# Patient Record
Sex: FEMALE | Race: WHITE | NOT HISPANIC OR LATINO | Employment: FULL TIME | URBAN - METROPOLITAN AREA
[De-identification: names, ages, dates, MRNs, and addresses within clinical notes are randomized per-mention and may not be internally consistent; named-entity substitution may affect disease eponyms.]

---

## 2017-07-05 ENCOUNTER — OFFICE VISIT (OUTPATIENT)
Dept: URGENT CARE | Facility: RETAIL CLINIC | Age: 32
End: 2017-07-05
Payer: COMMERCIAL

## 2017-07-05 ENCOUNTER — HOSPITAL ENCOUNTER (EMERGENCY)
Facility: CLINIC | Age: 32
Discharge: HOME | End: 2017-07-05
Attending: EMERGENCY MEDICINE | Admitting: EMERGENCY MEDICINE
Payer: COMMERCIAL

## 2017-07-05 VITALS
SYSTOLIC BLOOD PRESSURE: 146 MMHG | HEART RATE: 63 BPM | OXYGEN SATURATION: 96 % | DIASTOLIC BLOOD PRESSURE: 97 MMHG | TEMPERATURE: 97.4 F | RESPIRATION RATE: 18 BRPM

## 2017-07-05 VITALS
DIASTOLIC BLOOD PRESSURE: 108 MMHG | BODY MASS INDEX: 36.39 KG/M2 | RESPIRATION RATE: 16 BRPM | SYSTOLIC BLOOD PRESSURE: 160 MMHG | HEART RATE: 51 BPM | OXYGEN SATURATION: 100 % | WEIGHT: 250 LBS | TEMPERATURE: 98.6 F

## 2017-07-05 DIAGNOSIS — T67.5XXA HEAT EXHAUSTION, INITIAL ENCOUNTER: ICD-10-CM

## 2017-07-05 DIAGNOSIS — L55.0 SUNBURN OF FIRST DEGREE: ICD-10-CM

## 2017-07-05 DIAGNOSIS — T73.2XXA FATIGUE DUE TO EXPOSURE, INITIAL ENCOUNTER: Primary | ICD-10-CM

## 2017-07-05 LAB
ALBUMIN SERPL-MCNC: 3.7 G/DL (ref 3.4–5)
ALBUMIN UR-MCNC: NEGATIVE MG/DL
ALP SERPL-CCNC: 53 U/L (ref 40–150)
ALT SERPL W P-5'-P-CCNC: 20 U/L (ref 0–50)
ANION GAP SERPL CALCULATED.3IONS-SCNC: 6 MMOL/L (ref 3–14)
APPEARANCE UR: ABNORMAL
AST SERPL W P-5'-P-CCNC: 9 U/L (ref 0–45)
BACTERIA #/AREA URNS HPF: ABNORMAL /HPF
BASOPHILS # BLD AUTO: 0 10E9/L (ref 0–0.2)
BASOPHILS NFR BLD AUTO: 0.3 %
BILIRUB SERPL-MCNC: 0.3 MG/DL (ref 0.2–1.3)
BILIRUB UR QL STRIP: NEGATIVE
BUN SERPL-MCNC: 10 MG/DL (ref 7–30)
CALCIUM SERPL-MCNC: 8.7 MG/DL (ref 8.5–10.1)
CHLORIDE SERPL-SCNC: 110 MMOL/L (ref 94–109)
CK SERPL-CCNC: 84 U/L (ref 30–225)
CO2 SERPL-SCNC: 29 MMOL/L (ref 20–32)
COLOR UR AUTO: YELLOW
CREAT SERPL-MCNC: 0.71 MG/DL (ref 0.52–1.04)
DIFFERENTIAL METHOD BLD: NORMAL
EOSINOPHIL # BLD AUTO: 0.2 10E9/L (ref 0–0.7)
EOSINOPHIL NFR BLD AUTO: 1.9 %
ERYTHROCYTE [DISTWIDTH] IN BLOOD BY AUTOMATED COUNT: 13.1 % (ref 10–15)
GFR SERPL CREATININE-BSD FRML MDRD: ABNORMAL ML/MIN/1.7M2
GLUCOSE SERPL-MCNC: 94 MG/DL (ref 70–99)
GLUCOSE UR STRIP-MCNC: NEGATIVE MG/DL
HCG UR QL: NEGATIVE
HCT VFR BLD AUTO: 40.2 % (ref 35–47)
HGB BLD-MCNC: 13.2 G/DL (ref 11.7–15.7)
HGB UR QL STRIP: NEGATIVE
IMM GRANULOCYTES # BLD: 0 10E9/L (ref 0–0.4)
IMM GRANULOCYTES NFR BLD: 0.3 %
KETONES UR STRIP-MCNC: NEGATIVE MG/DL
LACTATE BLD-SCNC: 0.8 MMOL/L (ref 0.7–2.1)
LEUKOCYTE ESTERASE UR QL STRIP: NEGATIVE
LIPASE SERPL-CCNC: 165 U/L (ref 73–393)
LYMPHOCYTES # BLD AUTO: 2.5 10E9/L (ref 0.8–5.3)
LYMPHOCYTES NFR BLD AUTO: 31 %
MCH RBC QN AUTO: 28.5 PG (ref 26.5–33)
MCHC RBC AUTO-ENTMCNC: 32.8 G/DL (ref 31.5–36.5)
MCV RBC AUTO: 87 FL (ref 78–100)
MONOCYTES # BLD AUTO: 0.7 10E9/L (ref 0–1.3)
MONOCYTES NFR BLD AUTO: 8.9 %
MUCOUS THREADS #/AREA URNS LPF: PRESENT /LPF
NEUTROPHILS # BLD AUTO: 4.6 10E9/L (ref 1.6–8.3)
NEUTROPHILS NFR BLD AUTO: 57.6 %
NITRATE UR QL: NEGATIVE
PH UR STRIP: 6 PH (ref 5–7)
PLATELET # BLD AUTO: 228 10E9/L (ref 150–450)
POTASSIUM SERPL-SCNC: 3.6 MMOL/L (ref 3.4–5.3)
PROT SERPL-MCNC: 6.7 G/DL (ref 6.8–8.8)
RBC # BLD AUTO: 4.63 10E12/L (ref 3.8–5.2)
RBC #/AREA URNS AUTO: 1 /HPF (ref 0–2)
SODIUM SERPL-SCNC: 145 MMOL/L (ref 133–144)
SP GR UR STRIP: 1.02 (ref 1–1.03)
SQUAMOUS #/AREA URNS AUTO: 6 /HPF (ref 0–1)
URN SPEC COLLECT METH UR: ABNORMAL
UROBILINOGEN UR STRIP-MCNC: 0 MG/DL (ref 0–2)
WBC # BLD AUTO: 8 10E9/L (ref 4–11)
WBC #/AREA URNS AUTO: <1 /HPF (ref 0–2)

## 2017-07-05 PROCEDURE — 99284 EMERGENCY DEPT VISIT MOD MDM: CPT | Mod: Z6 | Performed by: EMERGENCY MEDICINE

## 2017-07-05 PROCEDURE — 99207 ZZC NO BILLABLE SERVICE THIS VISIT: CPT | Performed by: INTERNAL MEDICINE

## 2017-07-05 PROCEDURE — 83690 ASSAY OF LIPASE: CPT | Performed by: EMERGENCY MEDICINE

## 2017-07-05 PROCEDURE — 96374 THER/PROPH/DIAG INJ IV PUSH: CPT | Performed by: EMERGENCY MEDICINE

## 2017-07-05 PROCEDURE — 81025 URINE PREGNANCY TEST: CPT | Performed by: EMERGENCY MEDICINE

## 2017-07-05 PROCEDURE — 85025 COMPLETE CBC W/AUTO DIFF WBC: CPT | Performed by: EMERGENCY MEDICINE

## 2017-07-05 PROCEDURE — 82550 ASSAY OF CK (CPK): CPT | Performed by: EMERGENCY MEDICINE

## 2017-07-05 PROCEDURE — 99284 EMERGENCY DEPT VISIT MOD MDM: CPT | Mod: 25 | Performed by: EMERGENCY MEDICINE

## 2017-07-05 PROCEDURE — 25000128 H RX IP 250 OP 636: Performed by: EMERGENCY MEDICINE

## 2017-07-05 PROCEDURE — 81001 URINALYSIS AUTO W/SCOPE: CPT | Performed by: EMERGENCY MEDICINE

## 2017-07-05 PROCEDURE — 80053 COMPREHEN METABOLIC PANEL: CPT | Performed by: EMERGENCY MEDICINE

## 2017-07-05 PROCEDURE — 83605 ASSAY OF LACTIC ACID: CPT | Performed by: EMERGENCY MEDICINE

## 2017-07-05 PROCEDURE — 96361 HYDRATE IV INFUSION ADD-ON: CPT | Performed by: EMERGENCY MEDICINE

## 2017-07-05 RX ORDER — SODIUM CHLORIDE 9 MG/ML
1000 INJECTION, SOLUTION INTRAVENOUS CONTINUOUS
Status: DISCONTINUED | OUTPATIENT
Start: 2017-07-05 | End: 2017-07-05

## 2017-07-05 RX ORDER — ONDANSETRON 2 MG/ML
4 INJECTION INTRAMUSCULAR; INTRAVENOUS EVERY 30 MIN PRN
Status: DISCONTINUED | OUTPATIENT
Start: 2017-07-05 | End: 2017-07-05

## 2017-07-05 RX ADMIN — ONDANSETRON 4 MG: 2 INJECTION INTRAMUSCULAR; INTRAVENOUS at 13:11

## 2017-07-05 RX ADMIN — SODIUM CHLORIDE 1000 ML: 9 INJECTION, SOLUTION INTRAVENOUS at 13:10

## 2017-07-05 RX ADMIN — SODIUM CHLORIDE, PRESERVATIVE FREE 1000 ML: 5 INJECTION INTRAVENOUS at 12:03

## 2017-07-05 NOTE — ED NOTES
Pt got a sunburn two days ago, feeling sick and nauseated since.  [facility].

## 2017-07-05 NOTE — DISCHARGE INSTRUCTIONS
Heat Exhaustion  Heat exhaustion is a condition that develops during prolonged exposure to heat. It is more likely to occur during strenuous activity, such as exercise or manual labor. Symptoms include a fast heartbeat, excess sweating, extreme tiredness, muscle cramps, headache, and weakness. They may also include stomach cramps, nausea, and vomiting. The person may be lightheaded and dizzy, and may even faint.  Treatment for heat exhaustion involves cooling the body down and replacing lost fluids, electrolytes, and salts. Cooling may be done with fans, cold cloths, or a cold-water bath. Fluids are best replaced by drinking electrolyte solution, a sports drink, or water with 2 teaspoons of salt added for each 8 ounces. If a person is very dehydrated, confused, or unable to drink, IV (intravenous) fluids will likely be needed.  Heat exhaustion can progress to a serious condition called heatstroke, so it should be treated right away.  Home care  Continue to drink extra cold fluids at home during the next 12-24 hours. Water, electrolyte solution, or sports drinks are advised. Avoid alcohol and caffeine.  Preventing heat illness:    Protect yourself from the heat. Wear lightweight, light-colored clothing and a broad-brimmed hat.    Drink plenty of fluids before and during activity.    Limit exercise in hot or very humid weather. If you have to be active in the heat, take frequent breaks to drink fluids and cool down.    Avoid exercising when you are feeling ill.    Watch for symptoms of heat illness such as exhaustion, excess sweating, and lightheadedness. If any occur, move to a cool place, rest, and drink cool fluids. Lying down with your legs raised slightly can help you recover.    Avoid alcohol and caffeine.  Follow-up care  Follow up with your healthcare provider or this facility if symptoms do not improve within 1 hour.  When to seek medical advice  Call your healthcare provider right away for any of the  following:    Inability to keep fluids down    Vomiting    Worsening symptoms or new symptoms  Call 911  Call 911 or emergency services right away for any of these symptoms of heatstroke:    Confusion    Irrational behavior    Hallucinations    Trouble walking    Seizures    Vomiting or diarrhea    Hot, flushed skin    Passing out    Fever of 104 F (40 C) or higher  Date Last Reviewed: 6/26/2015 2000-2017 [employer]. [address] [place] [address], [place] [address]. All rights reserved. This information is not intended as a substitute for professional medical care. Always follow your healthcare professional's instructions.

## 2017-07-05 NOTE — ED AVS SNAPSHOT
[facility] Emergency Department    [address] [place] [address]    Phone:  [phone]    Fax:  [phone]                                       Leah Holloway   MRN: [id number]    Department:  [facility] Emergency Department   Date of Visit:  7/5/2017           After Visit Summary Signature Page     I have received my discharge instructions, and my questions have been answered. I have discussed any challenges I see with this plan with the nurse or doctor.    ..........................................................................................................................................  Patient/Patient Representative Signature      ..........................................................................................................................................  Patient Representative Print Name and Relationship to Patient    ..................................................               ................................................  Date                                            Time    ..........................................................................................................................................  Reviewed by Signature/Title    ...................................................              ..............................................  Date                                                            Time

## 2017-07-05 NOTE — NURSING NOTE
"Chief Complaint   Patient presents with     Sunburn     Sunburn monday on neck and back along with fatigue and just feeling of being ill     Nausea     patient states feeling nauseated everytime she eats or drinks. Denies pregancy last cycle was last week        Initial BP (!) 146/97 (BP Location: Right arm, Patient Position: Chair, Cuff Size: Adult Large)  Pulse 63  Temp 97.4  F (36.3  C) (Tympanic)  Resp 18  LMP 06/25/2017  SpO2 96% Estimated body mass index is 36.97 kg/(m^2) as calculated from the following:    Height as of 4/21/16: 5' 9.5\" (1.765 m).    Weight as of 4/21/16: 254 lb (115.2 kg).  Medication Reconciliation: complete   Jessica Sundet      "

## 2017-07-05 NOTE — ED PROVIDER NOTES
History     Chief Complaint   Patient presents with     Dehydration     HPI  Leah Holloway is a 31 year old female who presents with 2 day history of mild headache, generalized weakness and fatigue, nausea since she was out in the sun 2 days ago.  She did get 1st degree sunburn on her back.  She now presents with concerns for dehydration.  Patient admits she is urinating but does not recall if her urine was dark in color.  She denies chest pain or shortness of breath.  Has a mild nonproductive cough.  She is not running fever or chills.  Denies abdominal pain.  No vaginal discharge or bleeding.  She does not think she is pregnant.  Her last menstrual cycle was a week ago.  Has generalized weakness but no focal motor weakness or sensory changes.  No exposure to infectious illness.  Denies alcohol use with the incident.  She went to the urgent care this morning and they thought she was dehydrated but no blood work or urinalysis was assessed.  Review of the urgent care encounter show she was not tachycardic and was mildly hypertensive on the diastolic aspect. She was afebrile.    I have reviewed the Medications, Allergies, Past Medical and Surgical History, and Social History in the Epic system.         Review of Systems all other systems were reviewed and are negative.  History reviewed. No pertinent past medical history.  Patient Active Problem List   Diagnosis     Elevated blood pressure reading without diagnosis of hypertension     Headache     Syncope and collapse     GERD (gastroesophageal reflux disease)     CARDIOVASCULAR SCREENING; LDL GOAL LESS THAN 160     Synovitis of knee     Chondromalacia patellae, right     Renal artery stenosis (H)     Plica of knee, right     Current Facility-Administered Medications   Medication     0.9% sodium chloride infusion     ondansetron (ZOFRAN) injection 4 mg     Current Outpatient Prescriptions   Medication     IBUPROFEN PO     FLONASE 50 MCG/ACT NA SUSP     BP  MONITOR-STETHOSCOPE KIT     ORTHO TRI-CYCLEN LO OR        Allergies   Allergen Reactions     Aluminum Hives     Reaction when pt comes into contact with aluminum.     Benadryl [Anti-Itch]      No Clinical Screening - See Comments Rash     Adhesives     Social History     Social History     Marital status: Single     Spouse name: N/A     Number of children: N/A     Years of education: N/A     Occupational History           [employer], asst. mgr.      Social History Main Topics     Smoking status: Never Smoker     Smokeless tobacco: Never Used      Comment: No smokers in home.     Alcohol use No     Drug use: No     Sexual activity: No     Other Topics Concern     Not on file     Social History Narrative     Family History   Problem Relation Age of Onset     Coronary Artery Disease No family hx of      Colon Cancer No family hx of      MENTAL ILLNESS No family hx of      Obesity No family hx of      OSTEOPOROSIS No family hx of      Depression No family hx of      CEREBROVASCULAR DISEASE No family hx of      Physical Exam   BP: (!) 160/101  Pulse: 58  Temp: 98.6  F (37  C)  Resp: 16  Weight: 113.4 kg (250 lb)  SpO2: 100 %  Physical Exam Gen. alert cooperative female in mild distress.  HEENT shows no facial asymmetry or droop.  Pupils are equal and reactive to light and accommodation.  Extraocular motions are intact.  Ears show increased cerumen bilateral canals.  The partially visualized TMs appear normal.  Nasal passages are patent.  Orally she has moist mucosa.  Her speech is clear and concise.  Neck is supple without limitation.  No adenopathy or stridor.  Lungs are clear without adventitious sounds.  Cardiac regular rate without murmur.  Back there is no CVA tenderness.  Abdomen is obese with active bowel sounds.  She has mild left lower quadrant tenderness to palpation.  There is no guarding, rebound, or organomegaly.  On skin exam patient has first-degree sunburn on her upper back and arms.  No 2nd degree lesions  or other skin rashes are noted.    ED Course     ED Course     Procedures             Critical Care time:  none               Labs Ordered and Resulted from Time of ED Arrival Up to the Time of Departure from the ED   COMPREHENSIVE METABOLIC PANEL - Abnormal; Notable for the following:        Result Value    Sodium 145 (*)     Chloride 110 (*)     Protein Total 6.7 (*)     All other components within normal limits   URINE MACROSCOPIC WITH REFLEX TO MICRO - Abnormal; Notable for the following:     Bacteria Urine Few (*)     Squamous Epithelial /HPF Urine 6 (*)     Mucous Urine Present (*)     All other components within normal limits   CBC WITH PLATELETS DIFFERENTIAL   LIPASE   LACTIC ACID WHOLE BLOOD   CK TOTAL   HCG QUALITATIVE URINE     IV was established and fluid boluses provided.  Patient is given Zofran.  Blood work and urinalysis are ordered.  At 12:30 PM discussed results were blood work being essentially normal.  Normal CPK.  Normal white count and lactic acid.  No evidence of pancreatitis, hepatitis, or biliary disease but blood work.  Her sodium and chloride are mildly elevated.  1st liter of fluids was infusing.  Patient's nausea was not significant changed but she deferred further meds at this point.  At 1:25 PM on recheck patient is sitting upright and has no specific complaints.  She does not feel any worse but states she does not feel to baseline.  Denies nausea.  2nd liter fluids is infusing.  At 1:50 PM on recheck patient felt she could go home.  They have Zofran at home.  Assessments & Plan (with Medical Decision Making)   Patient is a 31-year-old female presents with complaints of possible dehydration.  Patient states that 3 days ago she was out in the sun for extended period of time.  She had 1st degree sunburn on her arms and back.  Since that time she has not felt well with generalized weakness, fatigue, mild headache, and nausea.  No vomiting or diarrhea.  No chest pain or shortness of  breath.  No fever or chills.  He was seen in the urgent care clinic and felt to be dehydrated and is in is sent for further evaluation.  At the urgent care clinic she was afebrile and did not have tachycardia or hypotension.  No blood work or urine assessment was done.  Upon arrival here the patient is afebrile and vital signs are stable.  She is not tachycardic is actually mildly hypertensive.  HEENT showed no irregularity.  She had moist mucosa.  She is able to speak in complete sentences.  Neck was supple.  Lungs were clear.  Cardiac auscultation was normal.  No CVA tenderness.  Mild left lower quadrant abdominal pain with palpation but no guarding or rebound.  Other than sunburn on her upper back and arms no other skin rashes are noted.  Blood work was obtained and is unremarkable as above.  This included a CPK total which was normal so doubt significant heat exhaustion or muscle breakdown.  Urinalysis showed no acute abnormality.  Pregnancy test was negative.  Patient received IV fluids with improvement.  Information on heat exhaustion is provided.  She should avoid excessive heat for the next 24-48 hrs.  Encourage fluids.  Reasons to return for reassessment are discussed.  I have reviewed the nursing notes.    I have reviewed the findings, diagnosis, plan and need for follow up with the patient.       New Prescriptions    No medications on file       Final diagnoses:   Heat exhaustion, initial encounter       7/5/2017   [facility] EMERGENCY DEPARTMENT     Lino Warren MD  07/05/17 2552

## 2017-07-05 NOTE — MR AVS SNAPSHOT
"              After Visit Summary   2017    Leah Holloway    MRN: [id number]           Patient Information     Date Of Birth          1985        Visit Information        Provider Department      2017 10:50 AM Gilbert San MD [facility]        Today's Diagnoses     Fatigue due to exposure, initial encounter    -  1       Follow-ups after your visit        Who to contact     You can reach your care team any time of the day by calling [phone].  Notification of test results:  If you have an abnormal lab result, we will notify you by phone as soon as possible.         Additional Information About Your Visit        MyChart Information     [employer] lets you send messages to your doctor, view your test results, renew your prescriptions, schedule appointments and more. To sign up, go to [email or web address] . Click on \"Log in\" on the left side of the screen, which will take you to the Welcome page. Then click on \"Sign up Now\" on the right side of the page.     You will be asked to enter the access code listed below, as well as some personal information. Please follow the directions to create your username and password.     Your access code is: [email or web address]  Expires: 10/3/2017 12:45 PM     Your access code will  in 90 days. If you need help or a new code, please call your [facility] clinic or [phone].        Care EveryWhere ID     This is your Care EveryWhere ID. This could be used by other organizations to access your [facility] medical records  [id number]        Your Vitals Were     Pulse Temperature Respirations Last Period Pulse Oximetry       63 97.4  F (36.3  C) (Tympanic) 18 2017 96%        Blood Pressure from Last 3 Encounters:   17 (!) 160/101   17 (!) 146/97   16 122/70    Weight from Last 3 Encounters:   17 250 lb (113.4 kg)   16 254 lb (115.2 kg)   16 250 lb (113.4 kg)              Today, you had the following     " No orders found for display       Primary Care Provider Office Phone # Fax #    Jacy Bruner [phone]       [facility]  [address] [facility] [address]        Equal Access to Services     ERIS MEDINA : Hadii aad ku hadarturoo Sojusticeali, waaxda luqadaha, qaybta kaalmada adeegyada, waxchaitanya rooseveltin hayaan limarashad fortune alexandr rodriguez. So [facility] [phone].    ATENCIÓN: Si habla español, tiene a george disposición servicios gratuitos de asistencia lingüística. Llame al [phone].    We comply with applicable federal civil rights laws and [place] laws. We do not discriminate on the basis of race, color, national origin, age, disability sex, sexual orientation or gender identity.            Thank you!     Thank you for choosing [facility]  for your care. Our goal is always to provide you with excellent care. Hearing back from our patients is one way we can continue to improve our services. Please take a few minutes to complete the written survey that you may receive in the mail after your visit with us. Thank you!             Your Updated Medication List - Protect others around you: Learn how to safely use, store and throw away your medicines at www.disposemymeds.org.          This list is accurate as of: 7/5/17 12:45 PM.  Always use your most recent med list.                   Brand Name Dispense Instructions for use Diagnosis    BP Monitor-Stethoscope Kit     1 kit    test as directed    Elevated blood pressure reading without diagnosis of hypertension       FLONASE 50 MCG/ACT spray   Generic drug:  fluticasone     3 Bottle    USE 2 SPRAYS IN EACH NOSTRIL ONCE DAILY    Chronic rhinitis       IBUPROFEN PO      Take 200 mg by mouth        ORTHO TRI-CYCLEN LO PO      1 TABLET DAILY

## 2017-07-05 NOTE — PROGRESS NOTES
A pleasant 31 year old female presents to express care w generalized malaise,fatigue,headache,dizziness ,nausea and poor appetite x 2 days. She was helping her parents move household belongings including furniture working in the intense sunlight from 10 am to 3-4 pm two days ago. No melena,hematuria or hematochezia is referred. No hematemesis. No vomiting or diarrhea.She reports sunburn in the upper torso. Her LMP was a week ago. Given the patient's clinical presentation and the need for further investigation including laboratory evaluation metabolic panel and potential rhabdomyolysis and volume depletion she is directed to [facility] accompanied by her mother who is driving. She agrees w this plan. She is currently clinically stable.

## 2017-07-05 NOTE — ED AVS SNAPSHOT
[facility] Emergency Department    [address] [place] [address]    Phone:  [phone]    Fax:  [phone]                                       Leah Holloway   MRN: [id number]    Department:  [facility] Emergency Department   Date of Visit:  7/5/2017           Patient Information     Date Of Birth          1985        Your diagnoses for this visit were:     Heat exhaustion, initial encounter        You were seen by Lino Warren MD.      Follow-up Information     Follow up with Jacy Bruner MD.    Specialty:  Family Practice    Why:  As needed    Contact information:    [facility]   [address] [facility] [address]  [phone]          Discharge Instructions         Heat Exhaustion  Heat exhaustion is a condition that develops during prolonged exposure to heat. It is more likely to occur during strenuous activity, such as exercise or manual labor. Symptoms include a fast heartbeat, excess sweating, extreme tiredness, muscle cramps, headache, and weakness. They may also include stomach cramps, nausea, and vomiting. The person may be lightheaded and dizzy, and may even faint.  Treatment for heat exhaustion involves cooling the body down and replacing lost fluids, electrolytes, and salts. Cooling may be done with fans, cold cloths, or a cold-water bath. Fluids are best replaced by drinking electrolyte solution, a sports drink, or water with 2 teaspoons of salt added for each 8 ounces. If a person is very dehydrated, confused, or unable to drink, IV (intravenous) fluids will likely be needed.  Heat exhaustion can progress to a serious condition called heatstroke, so it should be treated right away.  Home care  Continue to drink extra cold fluids at home during the next 12-24 hours. Water, electrolyte solution, or sports drinks are advised. Avoid alcohol and caffeine.  Preventing heat illness:    Protect yourself from the heat. Wear lightweight,  light-colored clothing and a broad-brimmed hat.    Drink plenty of fluids before and during activity.    Limit exercise in hot or very humid weather. If you have to be active in the heat, take frequent breaks to drink fluids and cool down.    Avoid exercising when you are feeling ill.    Watch for symptoms of heat illness such as exhaustion, excess sweating, and lightheadedness. If any occur, move to a cool place, rest, and drink cool fluids. Lying down with your legs raised slightly can help you recover.    Avoid alcohol and caffeine.  Follow-up care  Follow up with your healthcare provider or this facility if symptoms do not improve within 1 hour.  When to seek medical advice  Call your healthcare provider right away for any of the following:    Inability to keep fluids down    Vomiting    Worsening symptoms or new symptoms  Call 911  Call 911 or emergency services right away for any of these symptoms of heatstroke:    Confusion    Irrational behavior    Hallucinations    Trouble walking    Seizures    Vomiting or diarrhea    Hot, flushed skin    Passing out    Fever of 104 F (40 C) or higher  Date Last Reviewed: 6/26/2015 2000-2017 [employer]. [address] [place] [address]. All rights reserved. This information is not intended as a substitute for professional medical care. Always follow your healthcare professional's instructions.          24 Hour Appointment Hotline       To make an appointment at any [facility] clinic, call [phone] ([phone]). If you don't have a family doctor or clinic, we will help you find one. [facility] clinics are conveniently located to serve the needs of you and your family.             Review of your medicines      Our records show that you are taking the medicines listed below. If these are incorrect, please call your family doctor or clinic.        Dose / Directions Last dose taken    BP Monitor-Stethoscope Kit   Quantity:  1 kit        test as  "directed   Refills:  0        FLONASE 50 MCG/ACT spray   Quantity:  3 Bottle   Generic drug:  fluticasone        USE 2 SPRAYS IN EACH NOSTRIL ONCE DAILY   Refills:  3        IBUPROFEN PO   Dose:  200 mg        Take 200 mg by mouth   Refills:  0        ORTHO TRI-CYCLEN LO PO        1 TABLET DAILY   Refills:  0                Procedures and tests performed during your visit     CBC with platelets differential    CK total    Comprehensive metabolic panel    HCG qualitative urine    Lactic acid whole blood    Lipase    UA reflex to Microscopic      Orders Needing Specimen Collection     None      Pending Results     No orders found from 7/3/2017 to 7/6/2017.            Pending Culture Results     No orders found from 7/3/2017 to 7/6/2017.            Pending Results Instructions     If you had any lab results that were not finalized at the time of your Discharge, you can call the ED Lab Result RN at [phone]. You will be contacted by this team for any positive Lab results or changes in treatment. The nurses are available 7 days a week from 10A to 6:30P.  You can leave a message 24 hours per day and they will return your call.        Thank you for choosing [facility]       Thank you for choosing [facility] for your care. Our goal is always to provide you with excellent care. Hearing back from our patients is one way we can continue to improve our services. Please take a few minutes to complete the written survey that you may receive in the mail after you visit with us. Thank you!        [employer] Information     [employer] lets you send messages to your doctor, view your test results, renew your prescriptions, schedule appointments and more. To sign up, go to [email or web address] . Click on \"Log in\" on the left side of the screen, which will take you to the Welcome page. Then click on \"Sign up Now\" on the right side of the page.     You will be asked to enter the access code listed below, as well as some personal " information. Please follow the directions to create your username and password.     Your access code is: [email or web address]  Expires: 10/3/2017 12:45 PM     Your access code will  in 90 days. If you need help or a new code, please call your [facility] clinic or [phone].        Care EveryWhere ID     This is your Care EveryWhere ID. This could be used by other organizations to access your [facility] medical records  [id number]        Equal Access to Services     Robert F. Kennedy Medical CenterHEMALATHA : Nico woodardo Sorandy, waaxda luqadaha, qaybta kaalmada adefaizan, keiko strange . So [facility] [phone].    ATENCIÓN: Si habla español, tiene a george disposición servicios gratuitos de asistencia lingüística. Llame al [phone].    We comply with applicable federal civil rights laws and [place] laws. We do not discriminate on the basis of race, color, national origin, age, disability sex, sexual orientation or gender identity.            After Visit Summary       This is your record. Keep this with you and show to your community pharmacist(s) and doctor(s) at your next visit.

## 2017-07-06 NOTE — ED NOTES
Had 1500 cc IV fluids in and is feeling much better. BP is elevated. Told to please recheck it when she is feeling better and told if it is still elevated to see her primary.

## 2018-05-19 ENCOUNTER — RADIANT APPOINTMENT (OUTPATIENT)
Dept: GENERAL RADIOLOGY | Facility: CLINIC | Age: 33
End: 2018-05-19
Attending: PHYSICIAN ASSISTANT
Payer: COMMERCIAL

## 2018-05-19 ENCOUNTER — OFFICE VISIT (OUTPATIENT)
Dept: URGENT CARE | Facility: URGENT CARE | Age: 33
End: 2018-05-19

## 2018-05-19 VITALS
HEART RATE: 70 BPM | BODY MASS INDEX: 37.84 KG/M2 | OXYGEN SATURATION: 96 % | SYSTOLIC BLOOD PRESSURE: 147 MMHG | DIASTOLIC BLOOD PRESSURE: 94 MMHG | WEIGHT: 260 LBS | TEMPERATURE: 98 F

## 2018-05-19 DIAGNOSIS — M25.579 ANKLE PAIN: ICD-10-CM

## 2018-05-19 DIAGNOSIS — S93.402A SPRAIN OF LEFT ANKLE, UNSPECIFIED LIGAMENT, INITIAL ENCOUNTER: ICD-10-CM

## 2018-05-19 DIAGNOSIS — M25.572 ACUTE LEFT ANKLE PAIN: Primary | ICD-10-CM

## 2018-05-19 PROCEDURE — 99213 OFFICE O/P EST LOW 20 MIN: CPT | Performed by: PHYSICIAN ASSISTANT

## 2018-05-19 PROCEDURE — 73610 X-RAY EXAM OF ANKLE: CPT | Mod: LT

## 2018-05-19 ASSESSMENT — ENCOUNTER SYMPTOMS
PALPITATIONS: 0
JOINT SWELLING: 1
HEMATOLOGIC/LYMPHATIC NEGATIVE: 1
BRUISES/BLEEDS EASILY: 0
SHORTNESS OF BREATH: 0
CHEST TIGHTNESS: 0
RESPIRATORY NEGATIVE: 1
EYES NEGATIVE: 1
ACTIVITY CHANGE: 0
GASTROINTESTINAL NEGATIVE: 1
NEUROLOGICAL NEGATIVE: 1
ARTHRALGIAS: 1
NECK STIFFNESS: 0
CARDIOVASCULAR NEGATIVE: 1
FEVER: 0
APPETITE CHANGE: 0
MYALGIAS: 0
ABDOMINAL PAIN: 0
NAUSEA: 0
ALLERGIC/IMMUNOLOGIC NEGATIVE: 1
VOMITING: 0
NECK PAIN: 0
DIARRHEA: 0
WOUND: 0

## 2018-05-19 ASSESSMENT — PAIN SCALES - GENERAL: PAINLEVEL: MODERATE PAIN (5)

## 2018-05-19 NOTE — MR AVS SNAPSHOT
"              After Visit Summary   2018    Leah Holloway    MRN: [id number]           Patient Information     Date Of Birth          1985        Visit Information        Provider Department      2018 12:35 PM Baldemar Ramirez PA-C [facility]        Today's Diagnoses     Acute left ankle pain    -  1    Sprain of left ankle, unspecified ligament, initial encounter           Follow-ups after your visit        Who to contact     If you have questions or need follow up information about today's clinic visit or your schedule please contact [facility] directly at [phone].  Normal or non-critical lab and imaging results will be communicated to you by Locappyhart, letter or phone within 4 business days after the clinic has received the results. If you do not hear from us within 7 days, please contact the clinic through Locappyhart or phone. If you have a critical or abnormal lab result, we will notify you by phone as soon as possible.  Submit refill requests through [employer] or call your pharmacy and they will forward the refill request to us. Please allow 3 business days for your refill to be completed.          Additional Information About Your Visit        MyChart Information     [employer] lets you send messages to your doctor, view your test results, renew your prescriptions, schedule appointments and more. To sign up, go to [email or web address] . Click on \"Log in\" on the left side of the screen, which will take you to the Welcome page. Then click on \"Sign up Now\" on the right side of the page.     You will be asked to enter the access code listed below, as well as some personal information. Please follow the directions to create your username and password.     Your access code is: [email or web address]  Expires: 2018  1:54 PM     Your access code will  in 90 days. If you need help or a new code, please call your [facility] or [phone].        Care " EveryWhere ID     This is your Care EveryWhere ID. This could be used by other organizations to access your [facility] medical records  [id number]        Your Vitals Were     Pulse Temperature Pulse Oximetry BMI (Body Mass Index)          70 98  F (36.7  C) (Oral) 96% 37.84 kg/m2         Blood Pressure from Last 3 Encounters:   05/19/18 (!) 147/94   07/05/17 (!) 160/108   07/05/17 (!) 146/97    Weight from Last 3 Encounters:   05/19/18 260 lb (117.9 kg)   07/05/17 250 lb (113.4 kg)   04/21/16 254 lb (115.2 kg)               Primary Care Provider Office Phone # Fax #    Jacy Bruner [phone]       [address] [place] [address]        Equal Access to Services     KASHIF MEDINA : Hadii jagruti kern hadasho Soomaali, waaxda luqadaha, qaybta kaalmada adeegyada, keiko strange . So [facility] [phone].    ATENCIÓN: Si habla español, tiene a george disposición servicios gratuitos de asistencia lingüística. Sushma al [phone].    We comply with applicable federal civil rights laws and [place] laws. We do not discriminate on the basis of race, color, national origin, age, disability, sex, sexual orientation, or gender identity.            Thank you!     Thank you for choosing [facility]  for your care. Our goal is always to provide you with excellent care. Hearing back from our patients is one way we can continue to improve our services. Please take a few minutes to complete the written survey that you may receive in the mail after your visit with us. Thank you!             Your Updated Medication List - Protect others around you: Learn how to safely use, store and throw away your medicines at www.disposemymeds.org.          This list is accurate as of 5/19/18  1:54 PM.  Always use your most recent med list.                   Brand Name Dispense Instructions for use Diagnosis    BP Monitor-Stethoscope Kit     1 kit    test as directed    Elevated blood  pressure reading without diagnosis of hypertension       FLONASE 50 MCG/ACT spray   Generic drug:  fluticasone     3 Bottle    USE 2 SPRAYS IN EACH NOSTRIL ONCE DAILY    Chronic rhinitis       IBUPROFEN PO      Take 200 mg by mouth        ORTHO TRI-CYCLEN LO PO      1 TABLET DAILY

## 2018-05-19 NOTE — PROGRESS NOTES
Chief Complaint:    Chief Complaint   Patient presents with     Work Comp     5/11/18 left ankle       HPI: Leah Holloway is an 32 year old female who presents for evaluation and treatment of L ankle pain.  Patient tripped over a box 1 week ago and twisted the ankle.  She had immediate ankle pain and swelling.  She has been icing the ankle.  The pain has been improving.  She is walking on the L ankle with no problems.  She was advised to come and get it looked at by her employer.      ROS:      Review of Systems   Constitutional: Negative for activity change, appetite change and fever.   HENT: Negative.    Eyes: Negative.    Respiratory: Negative.  Negative for chest tightness and shortness of breath.    Cardiovascular: Negative.  Negative for chest pain and palpitations.   Gastrointestinal: Negative.  Negative for abdominal pain, diarrhea, nausea and vomiting.   Genitourinary: Negative.    Musculoskeletal: Positive for arthralgias and joint swelling. Negative for myalgias, neck pain and neck stiffness.   Skin: Negative.  Negative for rash and wound.   Allergic/Immunologic: Negative.  Negative for immunocompromised state.   Neurological: Negative.    Hematological: Negative.  Does not bruise/bleed easily.        Family History   Family History   Problem Relation Age of Onset     Coronary Artery Disease No family hx of      Colon Cancer No family hx of      MENTAL ILLNESS No family hx of      Obesity No family hx of      OSTEOPOROSIS No family hx of      Depression No family hx of      CEREBROVASCULAR DISEASE No family hx of         Surgical History:  Past Surgical History:   Procedure Laterality Date     ARTHROSCOPY KNEE Right 1/22/2016    Procedure: ARTHROSCOPY KNEE;  Surgeon: Ian Jaimes MD;  Location: [facility] OR     GENITOURINARY SURGERY       HC KNEE SCOPE,PART SYNOVECT Right 1/22/16    Medial plica excision - WORK COMP        Problem List:  Patient Active Problem List   Diagnosis     Elevated blood  pressure reading without diagnosis of hypertension     Headache     Syncope and collapse     GERD (gastroesophageal reflux disease)     CARDIOVASCULAR SCREENING; LDL GOAL LESS THAN 160     Synovitis of knee     Chondromalacia patellae, right     Renal artery stenosis (H)     Plica of knee, right        Allergies:  Allergies   Allergen Reactions     Aluminum Hives     Reaction when pt comes into contact with aluminum.     Benadryl [Anti-Itch]      No Clinical Screening - See Comments Rash     Adhesives        Current Meds:    Current Outpatient Prescriptions:      BP MONITOR-STETHOSCOPE KIT, test as directed (Patient not taking: No sig reported), Disp: 1 kit, Rfl: 0     FLONASE 50 MCG/ACT NA SUSP, USE 2 SPRAYS IN EACH NOSTRIL ONCE DAILY, Disp: 3 Bottle, Rfl: 3     IBUPROFEN PO, Take 200 mg by mouth, Disp: , Rfl:      ORTHO TRI-CYCLEN LO OR, 1 TABLET DAILY, Disp: , Rfl:      PHYSICAL EXAM:     Vital signs noted and reviewed by Baldemar Ramirez  BP (!) 147/94 (BP Location: Left arm, Patient Position: Chair, Cuff Size: Adult Large)  Pulse 70  Temp 98  F (36.7  C) (Oral)  Wt 260 lb (117.9 kg)  SpO2 96%  BMI 37.84 kg/m2     PEFR:  General appearance: healthy, alert and no distress  Ears: R TM - normal: no effusions, no erythema, and normal landmarks, L TM - normal: no effusions, no erythema, and normal landmarks  Eyes: R normal, L normal  Nose: normal  Oropharynx: normal  Neck: supple and no adenopathy  Lungs: normal and clear to auscultation  Heart: S1, S2 normal, no murmur, click, rub or gallop, regular rate and rhythm  Extremities: L ankle - Extremities normal. No deformities, edema, or skin discoloration., negative findings: no evidence of joint effusion, no evidence of joint instability, no crepitation detected, strength normal     Labs:     XR ANKLE LT G/E 3 VW 5/19/2018 12:49 PM     COMPARISON: None.     HISTORY: Ankle pain.         IMPRESSION: No fractures are seen in the left ankle. Ankle mortise  is  congruent with preserved joint space. Plantar calcaneal spur and mild  Achilles enthesopathy is seen.     LENNY HUFF MD     ASSESSMENT:     1. Ankle pain    2. Sprain of left ankle, unspecified ligament, initial encounter           PLAN:     Discussed imaging with patient.  XR of the ankle was negative for any acute fracture.  Continue to ice the ankle.  Ibuprofen for any discomfort.  Worrisome symptoms discussed with instructions to go to the ED.  Patient verbalized understanding and agreed with this plan.     Baldemar Ramirez  5/19/2018, 12:34 PM

## 2018-06-06 ENCOUNTER — HOSPITAL ENCOUNTER (EMERGENCY)
Facility: CLINIC | Age: 33
Discharge: HOME | End: 2018-06-07
Attending: FAMILY MEDICINE | Admitting: FAMILY MEDICINE
Payer: COMMERCIAL

## 2018-06-06 DIAGNOSIS — R19.7 DIARRHEA, UNSPECIFIED TYPE: ICD-10-CM

## 2018-06-06 DIAGNOSIS — H81.12 BENIGN PAROXYSMAL POSITIONAL VERTIGO OF LEFT EAR: ICD-10-CM

## 2018-06-06 DIAGNOSIS — H61.23 BILATERAL IMPACTED CERUMEN: ICD-10-CM

## 2018-06-06 LAB
ALBUMIN SERPL-MCNC: 3.9 G/DL (ref 3.4–5)
ALBUMIN UR-MCNC: NEGATIVE MG/DL
ALP SERPL-CCNC: 63 U/L (ref 40–150)
ALT SERPL W P-5'-P-CCNC: 20 U/L (ref 0–50)
ANION GAP SERPL CALCULATED.3IONS-SCNC: 9 MMOL/L (ref 3–14)
APPEARANCE UR: CLEAR
AST SERPL W P-5'-P-CCNC: 8 U/L (ref 0–45)
BACTERIA #/AREA URNS HPF: ABNORMAL /HPF
BASOPHILS # BLD AUTO: 0 10E9/L (ref 0–0.2)
BASOPHILS NFR BLD AUTO: 0.4 %
BILIRUB SERPL-MCNC: 0.2 MG/DL (ref 0.2–1.3)
BILIRUB UR QL STRIP: NEGATIVE
BUN SERPL-MCNC: 14 MG/DL (ref 7–30)
CALCIUM SERPL-MCNC: 8.8 MG/DL (ref 8.5–10.1)
CHLORIDE SERPL-SCNC: 109 MMOL/L (ref 94–109)
CO2 SERPL-SCNC: 25 MMOL/L (ref 20–32)
COLOR UR AUTO: YELLOW
CREAT SERPL-MCNC: 0.86 MG/DL (ref 0.52–1.04)
DIFFERENTIAL METHOD BLD: ABNORMAL
EOSINOPHIL # BLD AUTO: 0.2 10E9/L (ref 0–0.7)
EOSINOPHIL NFR BLD AUTO: 1.9 %
ERYTHROCYTE [DISTWIDTH] IN BLOOD BY AUTOMATED COUNT: 12.9 % (ref 10–15)
GFR SERPL CREATININE-BSD FRML MDRD: 75 ML/MIN/1.7M2
GLUCOSE SERPL-MCNC: 92 MG/DL (ref 70–99)
GLUCOSE UR STRIP-MCNC: NEGATIVE MG/DL
HCT VFR BLD AUTO: 41 % (ref 35–47)
HGB BLD-MCNC: 13.6 G/DL (ref 11.7–15.7)
HGB UR QL STRIP: NEGATIVE
IMM GRANULOCYTES # BLD: 0 10E9/L (ref 0–0.4)
IMM GRANULOCYTES NFR BLD: 0.4 %
KETONES UR STRIP-MCNC: NEGATIVE MG/DL
LEUKOCYTE ESTERASE UR QL STRIP: NEGATIVE
LIPASE SERPL-CCNC: 143 U/L (ref 73–393)
LYMPHOCYTES # BLD AUTO: 3.9 10E9/L (ref 0.8–5.3)
LYMPHOCYTES NFR BLD AUTO: 34.2 %
MCH RBC QN AUTO: 28.8 PG (ref 26.5–33)
MCHC RBC AUTO-ENTMCNC: 33.2 G/DL (ref 31.5–36.5)
MCV RBC AUTO: 87 FL (ref 78–100)
MONOCYTES # BLD AUTO: 0.9 10E9/L (ref 0–1.3)
MONOCYTES NFR BLD AUTO: 7.9 %
MUCOUS THREADS #/AREA URNS LPF: PRESENT /LPF
NEUTROPHILS # BLD AUTO: 6.3 10E9/L (ref 1.6–8.3)
NEUTROPHILS NFR BLD AUTO: 55.2 %
NITRATE UR QL: NEGATIVE
NRBC # BLD AUTO: 0 10*3/UL
NRBC BLD AUTO-RTO: 0 /100
PH UR STRIP: 6 PH (ref 5–7)
PLATELET # BLD AUTO: 237 10E9/L (ref 150–450)
POTASSIUM SERPL-SCNC: 4.1 MMOL/L (ref 3.4–5.3)
PROT SERPL-MCNC: 7.2 G/DL (ref 6.8–8.8)
RBC # BLD AUTO: 4.72 10E12/L (ref 3.8–5.2)
RBC #/AREA URNS AUTO: <1 /HPF (ref 0–2)
SODIUM SERPL-SCNC: 143 MMOL/L (ref 133–144)
SOURCE: ABNORMAL
SP GR UR STRIP: 1.02 (ref 1–1.03)
SQUAMOUS #/AREA URNS AUTO: 3 /HPF (ref 0–1)
UROBILINOGEN UR STRIP-MCNC: 0 MG/DL (ref 0–2)
WBC # BLD AUTO: 11.4 10E9/L (ref 4–11)
WBC #/AREA URNS AUTO: 1 /HPF (ref 0–5)

## 2018-06-06 PROCEDURE — 96361 HYDRATE IV INFUSION ADD-ON: CPT | Performed by: FAMILY MEDICINE

## 2018-06-06 PROCEDURE — 80053 COMPREHEN METABOLIC PANEL: CPT | Performed by: FAMILY MEDICINE

## 2018-06-06 PROCEDURE — 25000128 H RX IP 250 OP 636: Performed by: FAMILY MEDICINE

## 2018-06-06 PROCEDURE — 85025 COMPLETE CBC W/AUTO DIFF WBC: CPT | Performed by: FAMILY MEDICINE

## 2018-06-06 PROCEDURE — 81001 URINALYSIS AUTO W/SCOPE: CPT | Performed by: FAMILY MEDICINE

## 2018-06-06 PROCEDURE — 99284 EMERGENCY DEPT VISIT MOD MDM: CPT | Mod: Z6 | Performed by: FAMILY MEDICINE

## 2018-06-06 PROCEDURE — 83690 ASSAY OF LIPASE: CPT | Performed by: FAMILY MEDICINE

## 2018-06-06 PROCEDURE — 99284 EMERGENCY DEPT VISIT MOD MDM: CPT | Mod: 25 | Performed by: FAMILY MEDICINE

## 2018-06-06 PROCEDURE — 96374 THER/PROPH/DIAG INJ IV PUSH: CPT | Performed by: FAMILY MEDICINE

## 2018-06-06 RX ORDER — LIDOCAINE 40 MG/G
CREAM TOPICAL
Status: DISCONTINUED | OUTPATIENT
Start: 2018-06-06 | End: 2018-06-07 | Stop reason: HOSPADM

## 2018-06-06 RX ORDER — ONDANSETRON 2 MG/ML
4 INJECTION INTRAMUSCULAR; INTRAVENOUS EVERY 30 MIN PRN
Status: DISCONTINUED | OUTPATIENT
Start: 2018-06-06 | End: 2018-06-07 | Stop reason: HOSPADM

## 2018-06-06 RX ORDER — KETOROLAC TROMETHAMINE 30 MG/ML
30 INJECTION, SOLUTION INTRAMUSCULAR; INTRAVENOUS ONCE
Status: COMPLETED | OUTPATIENT
Start: 2018-06-06 | End: 2018-06-06

## 2018-06-06 RX ORDER — SODIUM CHLORIDE, SODIUM LACTATE, POTASSIUM CHLORIDE, CALCIUM CHLORIDE 600; 310; 30; 20 MG/100ML; MG/100ML; MG/100ML; MG/100ML
1000 INJECTION, SOLUTION INTRAVENOUS CONTINUOUS
Status: DISCONTINUED | OUTPATIENT
Start: 2018-06-06 | End: 2018-06-07 | Stop reason: HOSPADM

## 2018-06-06 RX ADMIN — KETOROLAC TROMETHAMINE 30 MG: 30 INJECTION, SOLUTION INTRAMUSCULAR at 21:26

## 2018-06-06 RX ADMIN — SODIUM CHLORIDE, POTASSIUM CHLORIDE, SODIUM LACTATE AND CALCIUM CHLORIDE 1000 ML: 600; 310; 30; 20 INJECTION, SOLUTION INTRAVENOUS at 22:37

## 2018-06-06 RX ADMIN — SODIUM CHLORIDE, POTASSIUM CHLORIDE, SODIUM LACTATE AND CALCIUM CHLORIDE 1000 ML: 600; 310; 30; 20 INJECTION, SOLUTION INTRAVENOUS at 21:26

## 2018-06-06 NOTE — ED AVS SNAPSHOT
[facility] Emergency Department    [address] [place] [address]    Phone:  [phone]    Fax:  [phone]                                       Leah Holloway   MRN: [id number]    Department:  [facility] Emergency Department   Date of Visit:  6/6/2018           Patient Information     Date Of Birth          1985        Your diagnoses for this visit were:     Benign paroxysmal positional vertigo of left ear     Bilateral impacted cerumen     Diarrhea, unspecified type suspect viral       You were seen by Tye Sarabia MD.      Follow-up Information     Follow up with Lexa Posadas MD.    Specialty:  Family Practice    Why:  As needed    Contact information:    [address]   [place] [address] [phone]          Discharge Instructions       We treated your left ear with the Modified Epley Maneuver.  You could do this at home if your vertigo returns.  Zyrtec can also be helpful.  Drink plenty of fluids.  Recheck in clinic if persistent problems.  Return to the ED if you develop any focal weakness, speech difficulty, facial droop, severe headache, or any concerns  It was a pleasure visiting with both of you this evening.  I am glad you are feeling better and hope you continue to improve.    Thank you for choosing [facility]. We appreciate the opportunity to meet your urgent medical needs. Please let us know if we could have done anything to make your stay more satisfying.    After discharge, please closely monitor for any new or worsening symptoms. Return to the Emergency Department if you develop any acute worsening signs or symptoms.    If you had lab work, cultures or imaging studies done during your stay, the final results may still be pending. We will call you if your plan of care needs to change. However, if you are not improving as expected, please follow up with your primary care provider or clinic.     Start any prescription  medications that were prescribed to you and take them as directed.     Please see additional handouts that may be pertinent to your condition.          Earwax, Home Treatment    Everyone produces earwax from the lining of the ear canal. It serves to lubricate and protect the ear. The wax that forms in the canal naturally moves toward the outside of the ear and falls out. Sometimes the ear canal may contain too much wax. This can cause a blockage and loss of hearing. Directions are given below for home treatment.  Home care  If your doctor has advised you to remove a wax blockage yourself, follow these directions:    Unless a medicine was prescribed, you may use an over-the-counter product made for clearing earwax. These contain carbamide peroxide. Lie down with the blocked ear facing upward. Apply one dropper full of medicine and wait a few minutes. Grasp the outer ear and wiggle it to help the solution enter the canal.    Lean over a sink or basin with the blocked ear facing downward. Use a bulb syringe filled with warm (not hot or cold) water to rinse the ear several times. Use gentle pressure only.    If you are having trouble draining the water out of your ear canal, put a few drops of rubbing alcohol (isopropyl alcohol) into the ear canal. This will help remove the remaining water.    Repeat this procedure once a day for up to three days, or until your hearing is back to normal. Do not use this treatment for more than three days in a row.  Don ts    Don t use cold water to rinse the ear. This will make you dizzy.    Don t perform this procedure if you have an ear infection.    Don t perform this procedure if you have a ruptured eardrum.    Don t use cotton swabs, matches, hairpins, keys, or other objects to  clean  the ear canal. This can cause infection of the ear canal or rupture the eardrum. Because of their size and shape, cotton swabs can push earwax deeper into the ear canal instead of removing  it.  Follow-up care  Follow up with your health care provider if you are not improving after three cleaning attempts, or as advised.  When to seek medical advice  Call your health care provider right away if any of these occur:    Worsening ear pain    Fever of 101 F (38.3 C) or higher, or as directed by your health care provider    Hearing does not return to normal after three days of treatment    Fluid drainage or bleeding from the ear canal    Swelling, redness, or tenderness of the outer ear    Headache, neck pain, or stiff neck    4836-6646 [employer]. [address] [place] [address]. All rights reserved. This information is not intended as a substitute for professional medical care. Always follow your healthcare professional's instructions.          Treating Diarrhea    Diarrhea happens when you have loose, watery, or frequent bowel movements. It is a common problem with many causes. Most cases of diarrhea clear up on their own. But certain cases may need treatment. Be sure to see your healthcare provider if your symptoms do not improve within a few days.  Getting relief  Treatment of diarrhea depends on its cause. Diarrhea caused by bacterial or parasite infection is often treated with antibiotics. Diarrhea caused by other factors, such as a stomach virus, often improves with simple home treatment. The tips below may also help relieve your symptoms.    Drink plenty of fluids. This helps prevent too much fluid loss (dehydration). Water, clear soups, and electrolyte solutions are good choices. Avoid alcohol, coffee, tea, and milk. These can irritate your intestines and make symptoms worse.    Suck on ice chips if drinking makes you queasy.    Return to your normal diet slowly. You may want to eat bland foods at first, such as rice and toast. Also, you may need to avoid certain foods for a while, such as dairy products. These can make symptoms worse. Ask your healthcare provider if there  are any other foods you should avoid.    If you were prescribed antibiotics, take them as directed.    Do not take anti-diarrhea medicines without asking your healthcare provider first.  Call your healthcare provider   Call your healthcare provider if you have any of the following:     A fever of 100.4 F (38.0 C) or higher, or as directed by your healthcare provider    Severe pain    Worsening diarrhea or diarrhea for more than 2 days    Bloody vomit or stool    Signs of dehydration (dizziness, dry mouth and tongue, rapid pulse, dark urine)  Date Last Reviewed: 7/1/2016 2000-2017 [employer]. [address] [place] [address]. All rights reserved. This information is not intended as a substitute for professional medical care. Always follow your healthcare professional's instructions.          Benign Paroxysmal Positional Vertigo     Your health care provider may move your head in certain ways to treat your BPPV.     Benign paroxysmal positional vertigo (BPPV) is a problem with the inner ear. The inner ear contains the vestibular system. This system is what helps you keep your balance. BPPV causes a feeling of spinning. It is a common problem of the vestibular system.  Understanding the vestibular system  The vestibular system of the ear is made up of very tiny parts. They include the utricle, saccule, and semicircular canals. The utricle is a tiny organ that contains calcium crystals. In some people, the crystals can move into the semicircular canals. When this happens, the system no longer works as it should. This causes BPPV. Benign means it is not life threatening. Paroxysmal means it happens suddenly. Positional means that it happens when you move your head. Vertigo is a feeling of spinning.  What causes BPPV?  Causes include injury to your head or neck. Other problems with the vestibular system may cause BPPV. In many people, the cause of BPPV is not known.  Symptoms of BPPV  You many  have repeated feelings of spinning (vertigo). The vertigo usually lasts less than 1 minute. Some movements, such as rolling over in bed, can bring on vertigo.  Diagnosing BPPV  Your primary healthcare provider may diagnose and treat your BPPV. Or you may see an ear, nose, and throat doctor (otolaryngologist). In some cases, you may see a nervous system doctor (neurologist).  The healthcare provider will ask about your symptoms and your medical history. He or she will examine you. You may have hearing and balance tests. As part of the exam, your healthcare provider may have you move your head and body in certain ways. If you have BPPV, the movements can bring on vertigo. Your provider will also look for abnormal movements of your eyes. You may have other tests to check your vestibular or nervous systems.  Treatment for BPPV  Your healthcare provider may try to move the calcium crystals. This is done by having you move your head and neck in certain ways. This treatment is safe and often works well. You may also be told to do these movements at home. You may still have vertigo for a few weeks. Your healthcare provider will recheck your symptoms, usually in about a month. Special physical therapy may also be part of treatment. In rare cases, surgery may be needed for BPPV that does not go away.     When to call the healthcare provider  Call your healthcare provider right away if you have any of these:    Symptoms that do not go away with treatment    Symptoms that get worse    New symptoms   Date Last Reviewed: 5/1/2017 2000-2017 [employer]. [address] [place] [address], [place] [address]. All rights reserved. This information is not intended as a substitute for professional medical care. Always follow your healthcare professional's instructions.          24 Hour Appointment Hotline       To make an appointment at any [facility], call [phone] ([phone]). If you don't have a family doctor or  clinic, we will help you find one. [facility] clinics are conveniently located to serve the needs of you and your family.             Review of your medicines      Our records show that you are taking the medicines listed below. If these are incorrect, please call your family doctor or clinic.        Dose / Directions Last dose taken    BP Monitor-Stethoscope Kit   Quantity:  1 kit        test as directed   Refills:  0        cetirizine 5 MG/5ML solution   Commonly known as:  zyrTEC   Dose:  10 mg   Quantity:  118 mL        Take 10 mLs (10 mg) by mouth 2 times daily as needed for allergies (hives, or vertigo)   Refills:  0        FLONASE 50 MCG/ACT spray   Quantity:  3 Bottle   Generic drug:  fluticasone        USE 2 SPRAYS IN EACH NOSTRIL ONCE DAILY   Refills:  3        IBUPROFEN PO   Dose:  200 mg        Take 200 mg by mouth   Refills:  0                Procedures and tests performed during your visit     CBC with platelets differential    Comprehensive metabolic panel    Lipase    Peripheral IV catheter    UA with Microscopic      Orders Needing Specimen Collection     None      Pending Results     No orders found for last 3 day(s).            Pending Culture Results     No orders found for last 3 day(s).            Pending Results Instructions     If you had any lab results that were not finalized at the time of your Discharge, you can call the ED Lab Result RN at [phone]. You will be contacted by this team for any positive Lab results or changes in treatment. The nurses are available 7 days a week from 10A to 6:30P.  You can leave a message 24 hours per day and they will return your call.        Thank you for choosing [facility]       Thank you for choosing [facility] for your care. Our goal is always to provide you with excellent care. Hearing back from our patients is one way we can continue to improve our services. Please take a few minutes to complete the written survey that you may receive in the mail after  "you visit with us. Thank you!        [employer] Information     "[employer]" lets you send messages to your doctor, view your test results, renew your prescriptions, schedule appointments and more. To sign up, go to [email or web address]/"[email or web address]" . Click on \"Log in\" on the left side of the screen, which will take you to the Welcome page. Then click on \"Sign up Now\" on the right side of the page.     You will be asked to enter the access code listed below, as well as some personal information. Please follow the directions to create your username and password.     Your access code is: [email or web address]  Expires: 2018  1:54 PM     Your access code will  in 90 days. If you need help or a new code, please call your [facility] clinic or [phone].        Care EveryWhere ID     This is your Care EveryWhere ID. This could be used by other organizations to access your [facility] medical records  [id number]        Equal Access to Services     ERIS MEDINA : Hadii jagruti woodardo Sorandy, waaxda luqadaha, qaybta kaalmada adefaizan, keiko strange . So [facility] [phone].    ATENCIÓN: Si habla español, tiene a george disposición servicios gratuitos de asistencia lingüística. Llame al [phone].    We comply with applicable federal civil rights laws and [place] laws. We do not discriminate on the basis of race, color, national origin, age, disability, sex, sexual orientation, or gender identity.            After Visit Summary       This is your record. Keep this with you and show to your community pharmacist(s) and doctor(s) at your next visit.                  "

## 2018-06-06 NOTE — ED AVS SNAPSHOT
[facility] Emergency Department    [address] [place] [address]    Phone:  [phone]    Fax:  [phone]                                       Leah Holloway   MRN: [id number]    Department:  [facility] Emergency Department   Date of Visit:  6/6/2018           After Visit Summary Signature Page     I have received my discharge instructions, and my questions have been answered. I have discussed any challenges I see with this plan with the nurse or doctor.    ..........................................................................................................................................  Patient/Patient Representative Signature      ..........................................................................................................................................  Patient Representative Print Name and Relationship to Patient    ..................................................               ................................................  Date                                            Time    ..........................................................................................................................................  Reviewed by Signature/Title    ...................................................              ..............................................  Date                                                            Time

## 2018-06-07 VITALS
BODY MASS INDEX: 38.14 KG/M2 | HEART RATE: 67 BPM | DIASTOLIC BLOOD PRESSURE: 98 MMHG | TEMPERATURE: 98.3 F | SYSTOLIC BLOOD PRESSURE: 172 MMHG | WEIGHT: 262 LBS | OXYGEN SATURATION: 99 % | RESPIRATION RATE: 20 BRPM

## 2018-06-07 RX ORDER — CETIRIZINE HYDROCHLORIDE 5 MG/1
10 TABLET ORAL 2 TIMES DAILY PRN
Qty: 118 ML | Refills: 0 | COMMUNITY
Start: 2018-06-07 | End: 2022-07-18

## 2018-06-07 NOTE — DISCHARGE INSTRUCTIONS
We treated your left ear with the Modified Epley Maneuver.  You could do this at home if your vertigo returns.  Zyrtec can also be helpful.  Drink plenty of fluids.  Recheck in clinic if persistent problems.  Return to the ED if you develop any focal weakness, speech difficulty, facial droop, severe headache, or any concerns  It was a pleasure visiting with both of you this evening.  I am glad you are feeling better and hope you continue to improve.    Thank you for choosing [facility]. We appreciate the opportunity to meet your urgent medical needs. Please let us know if we could have done anything to make your stay more satisfying.    After discharge, please closely monitor for any new or worsening symptoms. Return to the Emergency Department if you develop any acute worsening signs or symptoms.    If you had lab work, cultures or imaging studies done during your stay, the final results may still be pending. We will call you if your plan of care needs to change. However, if you are not improving as expected, please follow up with your primary care provider or clinic.     Start any prescription medications that were prescribed to you and take them as directed.     Please see additional handouts that may be pertinent to your condition.          Earwax, Home Treatment    Everyone produces earwax from the lining of the ear canal. It serves to lubricate and protect the ear. The wax that forms in the canal naturally moves toward the outside of the ear and falls out. Sometimes the ear canal may contain too much wax. This can cause a blockage and loss of hearing. Directions are given below for home treatment.  Home care  If your doctor has advised you to remove a wax blockage yourself, follow these directions:    Unless a medicine was prescribed, you may use an over-the-counter product made for clearing earwax. These contain carbamide peroxide. Lie down with the blocked ear facing upward. Apply one  dropper full of medicine and wait a few minutes. Grasp the outer ear and wiggle it to help the solution enter the canal.    Lean over a sink or basin with the blocked ear facing downward. Use a bulb syringe filled with warm (not hot or cold) water to rinse the ear several times. Use gentle pressure only.    If you are having trouble draining the water out of your ear canal, put a few drops of rubbing alcohol (isopropyl alcohol) into the ear canal. This will help remove the remaining water.    Repeat this procedure once a day for up to three days, or until your hearing is back to normal. Do not use this treatment for more than three days in a row.  Don ts    Don t use cold water to rinse the ear. This will make you dizzy.    Don t perform this procedure if you have an ear infection.    Don t perform this procedure if you have a ruptured eardrum.    Don t use cotton swabs, matches, hairpins, keys, or other objects to  clean  the ear canal. This can cause infection of the ear canal or rupture the eardrum. Because of their size and shape, cotton swabs can push earwax deeper into the ear canal instead of removing it.  Follow-up care  Follow up with your health care provider if you are not improving after three cleaning attempts, or as advised.  When to seek medical advice  Call your health care provider right away if any of these occur:    Worsening ear pain    Fever of 101 F (38.3 C) or higher, or as directed by your health care provider    Hearing does not return to normal after three days of treatment    Fluid drainage or bleeding from the ear canal    Swelling, redness, or tenderness of the outer ear    Headache, neck pain, or stiff neck    1817-1584 [employer]. [address] [place] [address], [place] [address]. All rights reserved. This information is not intended as a substitute for professional medical care. Always follow your healthcare professional's instructions.          Treating Diarrhea    Diarrhea  happens when you have loose, watery, or frequent bowel movements. It is a common problem with many causes. Most cases of diarrhea clear up on their own. But certain cases may need treatment. Be sure to see your healthcare provider if your symptoms do not improve within a few days.  Getting relief  Treatment of diarrhea depends on its cause. Diarrhea caused by bacterial or parasite infection is often treated with antibiotics. Diarrhea caused by other factors, such as a stomach virus, often improves with simple home treatment. The tips below may also help relieve your symptoms.    Drink plenty of fluids. This helps prevent too much fluid loss (dehydration). Water, clear soups, and electrolyte solutions are good choices. Avoid alcohol, coffee, tea, and milk. These can irritate your intestines and make symptoms worse.    Suck on ice chips if drinking makes you queasy.    Return to your normal diet slowly. You may want to eat bland foods at first, such as rice and toast. Also, you may need to avoid certain foods for a while, such as dairy products. These can make symptoms worse. Ask your healthcare provider if there are any other foods you should avoid.    If you were prescribed antibiotics, take them as directed.    Do not take anti-diarrhea medicines without asking your healthcare provider first.  Call your healthcare provider   Call your healthcare provider if you have any of the following:     A fever of 100.4 F (38.0 C) or higher, or as directed by your healthcare provider    Severe pain    Worsening diarrhea or diarrhea for more than 2 days    Bloody vomit or stool    Signs of dehydration (dizziness, dry mouth and tongue, rapid pulse, dark urine)  Date Last Reviewed: 7/1/2016 2000-2017 [employer]. [address] [place] [address], [place] [address]. All rights reserved. This information is not intended as a substitute for professional medical care. Always follow your healthcare professional's  instructions.          Benign Paroxysmal Positional Vertigo     Your health care provider may move your head in certain ways to treat your BPPV.     Benign paroxysmal positional vertigo (BPPV) is a problem with the inner ear. The inner ear contains the vestibular system. This system is what helps you keep your balance. BPPV causes a feeling of spinning. It is a common problem of the vestibular system.  Understanding the vestibular system  The vestibular system of the ear is made up of very tiny parts. They include the utricle, saccule, and semicircular canals. The utricle is a tiny organ that contains calcium crystals. In some people, the crystals can move into the semicircular canals. When this happens, the system no longer works as it should. This causes BPPV. Benign means it is not life threatening. Paroxysmal means it happens suddenly. Positional means that it happens when you move your head. Vertigo is a feeling of spinning.  What causes BPPV?  Causes include injury to your head or neck. Other problems with the vestibular system may cause BPPV. In many people, the cause of BPPV is not known.  Symptoms of BPPV  You many have repeated feelings of spinning (vertigo). The vertigo usually lasts less than 1 minute. Some movements, such as rolling over in bed, can bring on vertigo.  Diagnosing BPPV  Your primary healthcare provider may diagnose and treat your BPPV. Or you may see an ear, nose, and throat doctor (otolaryngologist). In some cases, you may see a nervous system doctor (neurologist).  The healthcare provider will ask about your symptoms and your medical history. He or she will examine you. You may have hearing and balance tests. As part of the exam, your healthcare provider may have you move your head and body in certain ways. If you have BPPV, the movements can bring on vertigo. Your provider will also look for abnormal movements of your eyes. You may have other tests to check your vestibular or nervous  systems.  Treatment for BPPV  Your healthcare provider may try to move the calcium crystals. This is done by having you move your head and neck in certain ways. This treatment is safe and often works well. You may also be told to do these movements at home. You may still have vertigo for a few weeks. Your healthcare provider will recheck your symptoms, usually in about a month. Special physical therapy may also be part of treatment. In rare cases, surgery may be needed for BPPV that does not go away.     When to call the healthcare provider  Call your healthcare provider right away if you have any of these:    Symptoms that do not go away with treatment    Symptoms that get worse    New symptoms   Date Last Reviewed: 5/1/2017 2000-2017 [employer] [address] [place] [address], [place] [address]. All rights reserved. This information is not intended as a substitute for professional medical care. Always follow your healthcare professional's instructions.

## 2018-06-07 NOTE — ED NOTES
Pt was in [place] for girls wknd last wknd flew home feeling fine.  She does not drink.  For the last 2 days she has felt dizzy/lightheaded w/some nausea and notes a feeling that the room spinning.  Body aches and malaise.  Spinning has improved while in ED, but she cont to not feel well.   Generally well otherwise.  Works at [employer].

## 2018-06-07 NOTE — ED PROVIDER NOTES
"  History     Chief Complaint   Patient presents with     Dizziness     Nausea     The history is provided by the patient.     Leah Holloway is a 32 year old female with a past medical history of GERD and hypertension who presents to the ED for evaluation of dizziness and nausea.     Patient started experiencing symptoms two days ago consisting of feeling fatigue, nauseated, muscle pain, dizziness, lightheadedness and diarrhea. Patient started having diarrhea yesterday. She had 6 episodes yesterday and 3 today. Denies any blood in stool. Every time she would eat or drink something she would become nauseated. Last night around midnight she started to feeling dizzy while lying down. She continues to have a \"head fog\" today. When she is standing up she feels off balance and moving fast will cause blurred vision. Last night when she took her temperature it was 100.8 degrees but that was resolved 0600 this morning. She also started experiencing a headache this morning along with bilateral arm and leg muscle pain.     LMP was about a week ago and they typically last 5-7 days. Denies chance of pregnancy as she is not sexually active.  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 Denies cough, shortness of breath, chest pain, dysuria, hematuria, vomiting or any other associated symptoms.     She is right hand dominant. She had two meals today. This morning for breakfast she has two donuts and a protein shake and had lunch around 1500 which consisted of nachos and buffalo wings. Has had some Snapple juice and a couple of Mountain Dews today.      Problem List:    Patient Active Problem List    Diagnosis Date Noted     Plica of knee, right 01/28/2016     Priority: Medium     Renal artery stenosis (H) 01/13/2016     Priority: Medium     Surgery in 2009       Chondromalacia patellae, right 09/10/2015     Priority: Medium     Synovitis of knee 08/13/2015     Priority: Medium     CARDIOVASCULAR SCREENING; LDL GOAL LESS THAN " 160 10/31/2010     Priority: Medium     GERD (gastroesophageal reflux disease) 05/28/2008     Priority: Medium     Elevated blood pressure reading without diagnosis of hypertension 03/19/2008     Priority: Medium     Headache 03/19/2008     Priority: Medium     Problem list name updated by automated process. Provider to review       Syncope and collapse 03/19/2008     Priority: Medium        Past Medical History:    History reviewed. No pertinent past medical history.    Past Surgical History:    Past Surgical History:   Procedure Laterality Date     ARTHROSCOPY KNEE Right 1/22/2016    Procedure: ARTHROSCOPY KNEE;  Surgeon: Ian Jaimes MD;  Location: [facility] OR     GENITOURINARY SURGERY       HC KNEE SCOPE,PART SYNOVECT Right 1/22/16    Medial plica excision - WORK COMP       Family History:    Family History   Problem Relation Age of Onset     Coronary Artery Disease No family hx of      Colon Cancer No family hx of      MENTAL ILLNESS No family hx of      Obesity No family hx of      OSTEOPOROSIS No family hx of      Depression No family hx of      CEREBROVASCULAR DISEASE No family hx of        Social History:  Marital Status:  Single [1]  Social History   Substance Use Topics     Smoking status: Never Smoker     Smokeless tobacco: Never Used      Comment: No smokers in home.     Alcohol use No        Medications:      cetirizine (ZYRTEC) 5 MG/5ML solution   FLONASE 50 MCG/ACT NA SUSP   BP MONITOR-STETHOSCOPE KIT   IBUPROFEN PO         Review of Systems   All other systems reviewed and are negative.    All other ROS reviewed and are negative or non-contributory except as stated in HPI.    Physical Exam   BP: (!) 170/108  Pulse: 67  Temp: 98.3  F (36.8  C)  Resp: 16  Weight: 118.8 kg (262 lb)  SpO2: 100 %      Physical Exam   Constitutional: She is oriented to person, place, and time. She appears well-developed and well-nourished. No distress.   HENT:   Head: Normocephalic.   Both EACs are filled with cerumen.   I cannot see the TMs.   Eyes: EOM are normal. Pupils are equal, round, and reactive to light. Right eye exhibits no nystagmus. Left eye exhibits no nystagmus.   Neck: Normal range of motion. Neck supple.   Cardiovascular: Normal rate, regular rhythm and normal heart sounds.    No murmur heard.  Pulmonary/Chest: Effort normal and breath sounds normal. No respiratory distress.   Abdominal: Soft. Bowel sounds are normal. There is tenderness (mild diffuse). There is no rebound and no guarding.   Musculoskeletal: Normal range of motion. She exhibits no edema.   Neurological: She is alert and oriented to person, place, and time. She has normal strength. No cranial nerve deficit or sensory deficit. Coordination (FNF and HKS are normal) normal. GCS eye subscore is 4. GCS verbal subscore is 5. GCS motor subscore is 6.   Skin: Skin is warm and dry. No rash noted.   Psychiatric: She has a normal mood and affect.       ED Course  (with Medical Decision Making)      32-year-old female with a 2 day history of body aches and postprandial nausea.  Had diarrhea yesterday and today but no vomiting.  Did have a fever last night but that broke this morning.  Has felt off balance with movement.  Probably a little on the dry side as her oral intake has been down.  Diet is poor but is not drinking much in the way of free water either.  She has a bit of a headache the past hour or so as well.    IV placed.  Labs drawn.  1 L of LR while waiting for results to come back.  Toradol for the headache.  Zofran for nausea.    Still has not urinated after 1 L of LR.  We will give her a second liter.  I did irrigate both of her ears and got out large plugs of cerumen bilaterally.  She is feeling a little bit more dizzy now after the ear irrigation.  We will let things settle down and reevaluate.  So far her blood work looks good except her white count is up just slightly 11.4 .   UA was unremarkable.    Headache resolved with the Toradol.  Her his  nausea is also improved.  Still feels a spinning sensation with head movement.    Meseret-Hallpike maneuver caused more symptoms on the left.    Modified Epley maneuver ×2 to the left side gave her good results and she is feeling much better.  She is steady on her feet and she can now turn her head and move about where as before, actions like that would have caused symptoms.  She feels like she can make it at home and plans on staying with her mother tonight who is here ravindra.         ED Course     Procedures               Critical Care time:  none               Results for orders placed or performed during the hospital encounter of 06/06/18 (from the past 24 hour(s))   CBC with platelets differential   Result Value Ref Range    WBC 11.4 (H) 4.0 - 11.0 10e9/L    RBC Count 4.72 3.8 - 5.2 10e12/L    Hemoglobin 13.6 11.7 - 15.7 g/dL    Hematocrit 41.0 35.0 - 47.0 %    MCV 87 78 - 100 fl    MCH 28.8 26.5 - 33.0 pg    MCHC 33.2 31.5 - 36.5 g/dL    RDW 12.9 10.0 - 15.0 %    Platelet Count 237 150 - 450 10e9/L    Diff Method Automated Method     % Neutrophils 55.2 %    % Lymphocytes 34.2 %    % Monocytes 7.9 %    % Eosinophils 1.9 %    % Basophils 0.4 %    % Immature Granulocytes 0.4 %    Nucleated RBCs 0 0 /100    Absolute Neutrophil 6.3 1.6 - 8.3 10e9/L    Absolute Lymphocytes 3.9 0.8 - 5.3 10e9/L    Absolute Monocytes 0.9 0.0 - 1.3 10e9/L    Absolute Eosinophils 0.2 0.0 - 0.7 10e9/L    Absolute Basophils 0.0 0.0 - 0.2 10e9/L    Abs Immature Granulocytes 0.0 0 - 0.4 10e9/L    Absolute Nucleated RBC 0.0    Comprehensive metabolic panel   Result Value Ref Range    Sodium 143 133 - 144 mmol/L    Potassium 4.1 3.4 - 5.3 mmol/L    Chloride 109 94 - 109 mmol/L    Carbon Dioxide 25 20 - 32 mmol/L    Anion Gap 9 3 - 14 mmol/L    Glucose 92 70 - 99 mg/dL    Urea Nitrogen 14 7 - 30 mg/dL    Creatinine 0.86 0.52 - 1.04 mg/dL    GFR Estimate 75 >60 mL/min/1.7m2    GFR Estimate If Black >90 >60 mL/min/1.7m2    Calcium 8.8 8.5 - 10.1  mg/dL    Bilirubin Total 0.2 0.2 - 1.3 mg/dL    Albumin 3.9 3.4 - 5.0 g/dL    Protein Total 7.2 6.8 - 8.8 g/dL    Alkaline Phosphatase 63 40 - 150 U/L    ALT 20 0 - 50 U/L    AST 8 0 - 45 U/L   Lipase   Result Value Ref Range    Lipase 143 73 - 393 U/L   UA with Microscopic   Result Value Ref Range    Color Urine Yellow     Appearance Urine Clear     Glucose Urine Negative NEG^Negative mg/dL    Bilirubin Urine Negative NEG^Negative    Ketones Urine Negative NEG^Negative mg/dL    Specific Gravity Urine 1.020 1.003 - 1.035    Blood Urine Negative NEG^Negative    pH Urine 6.0 5.0 - 7.0 pH    Protein Albumin Urine Negative NEG^Negative mg/dL    Urobilinogen mg/dL 0.0 0.0 - 2.0 mg/dL    Nitrite Urine Negative NEG^Negative    Leukocyte Esterase Urine Negative NEG^Negative    Source Unspecified Urine     WBC Urine 1 0 - 5 /HPF    RBC Urine <1 0 - 2 /HPF    Bacteria Urine Few (A) NEG^Negative /HPF    Squamous Epithelial /HPF Urine 3 (H) 0 - 1 /HPF    Mucous Urine Present (A) NEG^Negative /LPF       Medications   lidocaine 1 % 1 mL (not administered)   lidocaine (LMX4) kit (not administered)   sodium chloride (PF) 0.9% PF flush 3 mL (not administered)   sodium chloride (PF) 0.9% PF flush 3 mL (3 mLs Intracatheter Not Given 6/6/18 2132)   lactated ringers BOLUS 1,000 mL (0 mLs Intravenous Stopped 6/6/18 2236)     Followed by   lactated ringers infusion (0 mLs Intravenous Stopped 6/6/18 2354)   ondansetron (ZOFRAN) injection 4 mg (not administered)   ketorolac (TORADOL) injection 30 mg (30 mg Intravenous Given 6/6/18 2126)       Assessments & Plan     I have reviewed the nursing notes.    I have reviewed the findings, diagnosis, plan and need for follow up with the patient.       New Prescriptions    No medications on file       Final diagnoses:   Benign paroxysmal positional vertigo of left ear   Bilateral impacted cerumen   Diarrhea, unspecified type - suspect viral     This document serves as a record of services personally  performed by Tye Sarabia MD. It was created on their behalf by Meme Caruso, a trained medical scribe. The creation of this record is based on the provider's personal observations and the statements of the patient. This document has been checked and approved by the attending provider.    Note: Chart documentation done in part with Dragon Voice Recognition software. Although reviewed after completion, some word and grammatical errors may remain.        6/6/2018   [facility] EMERGENCY DEPARTMENT     Tye Sarabia MD  06/07/18 0019

## 2018-06-11 ENCOUNTER — HOSPITAL ENCOUNTER (EMERGENCY)
Facility: CLINIC | Age: 33
Discharge: HOME | End: 2018-06-11
Attending: EMERGENCY MEDICINE | Admitting: EMERGENCY MEDICINE
Payer: COMMERCIAL

## 2018-06-11 ENCOUNTER — APPOINTMENT (OUTPATIENT)
Dept: ULTRASOUND IMAGING | Facility: CLINIC | Age: 33
End: 2018-06-11
Attending: EMERGENCY MEDICINE
Payer: COMMERCIAL

## 2018-06-11 ENCOUNTER — APPOINTMENT (OUTPATIENT)
Dept: CT IMAGING | Facility: CLINIC | Age: 33
End: 2018-06-11
Attending: EMERGENCY MEDICINE
Payer: COMMERCIAL

## 2018-06-11 VITALS
DIASTOLIC BLOOD PRESSURE: 68 MMHG | TEMPERATURE: 99.6 F | RESPIRATION RATE: 20 BRPM | SYSTOLIC BLOOD PRESSURE: 124 MMHG | BODY MASS INDEX: 37.55 KG/M2 | OXYGEN SATURATION: 98 % | WEIGHT: 258 LBS

## 2018-06-11 DIAGNOSIS — K52.9 GASTROENTERITIS: ICD-10-CM

## 2018-06-11 LAB
ALBUMIN UR-MCNC: NEGATIVE MG/DL
ANION GAP SERPL CALCULATED.3IONS-SCNC: 10 MMOL/L (ref 3–14)
APPEARANCE UR: CLEAR
BACTERIA #/AREA URNS HPF: ABNORMAL /HPF
BASOPHILS # BLD AUTO: 0 10E9/L (ref 0–0.2)
BASOPHILS NFR BLD AUTO: 0.2 %
BILIRUB UR QL STRIP: NEGATIVE
BUN SERPL-MCNC: 10 MG/DL (ref 7–30)
CALCIUM SERPL-MCNC: 8.4 MG/DL (ref 8.5–10.1)
CHLORIDE SERPL-SCNC: 108 MMOL/L (ref 94–109)
CO2 SERPL-SCNC: 24 MMOL/L (ref 20–32)
COLOR UR AUTO: YELLOW
CREAT SERPL-MCNC: 0.84 MG/DL (ref 0.52–1.04)
DIFFERENTIAL METHOD BLD: ABNORMAL
EOSINOPHIL # BLD AUTO: 0.1 10E9/L (ref 0–0.7)
EOSINOPHIL NFR BLD AUTO: 0.4 %
ERYTHROCYTE [DISTWIDTH] IN BLOOD BY AUTOMATED COUNT: 13.4 % (ref 10–15)
GFR SERPL CREATININE-BSD FRML MDRD: 78 ML/MIN/1.7M2
GLUCOSE SERPL-MCNC: 127 MG/DL (ref 70–99)
GLUCOSE UR STRIP-MCNC: NEGATIVE MG/DL
HCG SERPL QL: NEGATIVE
HCT VFR BLD AUTO: 41 % (ref 35–47)
HGB BLD-MCNC: 14.2 G/DL (ref 11.7–15.7)
HGB UR QL STRIP: NEGATIVE
KETONES UR STRIP-MCNC: NEGATIVE MG/DL
LEUKOCYTE ESTERASE UR QL STRIP: NEGATIVE
LYMPHOCYTES # BLD AUTO: 0.8 10E9/L (ref 0.8–5.3)
LYMPHOCYTES NFR BLD AUTO: 7 %
MCH RBC QN AUTO: 29.7 PG (ref 26.5–33)
MCHC RBC AUTO-ENTMCNC: 34.6 G/DL (ref 31.5–36.5)
MCV RBC AUTO: 86 FL (ref 78–100)
MONOCYTES # BLD AUTO: 0.7 10E9/L (ref 0–1.3)
MONOCYTES NFR BLD AUTO: 6.2 %
NEUTROPHILS # BLD AUTO: 10.1 10E9/L (ref 1.6–8.3)
NEUTROPHILS NFR BLD AUTO: 86.2 %
NITRATE UR QL: NEGATIVE
PH UR STRIP: 7 PH (ref 5–7)
PLATELET # BLD AUTO: 179 10E9/L (ref 150–450)
POTASSIUM SERPL-SCNC: 3.6 MMOL/L (ref 3.4–5.3)
RBC # BLD AUTO: 4.78 10E12/L (ref 3.8–5.2)
RBC #/AREA URNS AUTO: 1 /HPF (ref 0–2)
SODIUM SERPL-SCNC: 142 MMOL/L (ref 133–144)
SOURCE: ABNORMAL
SP GR UR STRIP: 1.02 (ref 1–1.03)
SQUAMOUS #/AREA URNS AUTO: 2 /HPF (ref 0–1)
UROBILINOGEN UR STRIP-MCNC: 0 MG/DL (ref 0–2)
WBC # BLD AUTO: 11.7 10E9/L (ref 4–11)
WBC #/AREA URNS AUTO: 1 /HPF (ref 0–5)

## 2018-06-11 PROCEDURE — 96375 TX/PRO/DX INJ NEW DRUG ADDON: CPT | Performed by: EMERGENCY MEDICINE

## 2018-06-11 PROCEDURE — 81001 URINALYSIS AUTO W/SCOPE: CPT | Performed by: EMERGENCY MEDICINE

## 2018-06-11 PROCEDURE — 99285 EMERGENCY DEPT VISIT HI MDM: CPT | Mod: Z6 | Performed by: EMERGENCY MEDICINE

## 2018-06-11 PROCEDURE — 96374 THER/PROPH/DIAG INJ IV PUSH: CPT | Mod: 59 | Performed by: EMERGENCY MEDICINE

## 2018-06-11 PROCEDURE — 80048 BASIC METABOLIC PNL TOTAL CA: CPT | Performed by: FAMILY MEDICINE

## 2018-06-11 PROCEDURE — 84703 CHORIONIC GONADOTROPIN ASSAY: CPT | Performed by: EMERGENCY MEDICINE

## 2018-06-11 PROCEDURE — 25000128 H RX IP 250 OP 636: Performed by: RADIOLOGY

## 2018-06-11 PROCEDURE — 25000125 ZZHC RX 250: Performed by: RADIOLOGY

## 2018-06-11 PROCEDURE — 93005 ELECTROCARDIOGRAM TRACING: CPT | Performed by: EMERGENCY MEDICINE

## 2018-06-11 PROCEDURE — 99285 EMERGENCY DEPT VISIT HI MDM: CPT | Mod: 25 | Performed by: EMERGENCY MEDICINE

## 2018-06-11 PROCEDURE — 74177 CT ABD & PELVIS W/CONTRAST: CPT

## 2018-06-11 PROCEDURE — 85025 COMPLETE CBC W/AUTO DIFF WBC: CPT | Performed by: FAMILY MEDICINE

## 2018-06-11 PROCEDURE — 25000128 H RX IP 250 OP 636: Performed by: EMERGENCY MEDICINE

## 2018-06-11 PROCEDURE — 25000128 H RX IP 250 OP 636: Performed by: FAMILY MEDICINE

## 2018-06-11 PROCEDURE — 96361 HYDRATE IV INFUSION ADD-ON: CPT | Performed by: EMERGENCY MEDICINE

## 2018-06-11 PROCEDURE — 96376 TX/PRO/DX INJ SAME DRUG ADON: CPT | Performed by: EMERGENCY MEDICINE

## 2018-06-11 PROCEDURE — 76856 US EXAM PELVIC COMPLETE: CPT

## 2018-06-11 RX ORDER — ONDANSETRON 4 MG/1
4 TABLET, ORALLY DISINTEGRATING ORAL EVERY 6 HOURS PRN
Qty: 6 TABLET | Refills: 0 | Status: SHIPPED | OUTPATIENT
Start: 2018-06-11 | End: 2018-06-18

## 2018-06-11 RX ORDER — ONDANSETRON 2 MG/ML
4 INJECTION INTRAMUSCULAR; INTRAVENOUS
Status: COMPLETED | OUTPATIENT
Start: 2018-06-11 | End: 2018-06-11

## 2018-06-11 RX ORDER — IOPAMIDOL 755 MG/ML
500 INJECTION, SOLUTION INTRAVASCULAR ONCE
Status: COMPLETED | OUTPATIENT
Start: 2018-06-11 | End: 2018-06-11

## 2018-06-11 RX ORDER — SODIUM CHLORIDE 9 MG/ML
INJECTION, SOLUTION INTRAVENOUS CONTINUOUS
Status: DISCONTINUED | OUTPATIENT
Start: 2018-06-11 | End: 2018-06-11 | Stop reason: HOSPADM

## 2018-06-11 RX ORDER — ONDANSETRON 2 MG/ML
4 INJECTION INTRAMUSCULAR; INTRAVENOUS EVERY 30 MIN PRN
Status: DISCONTINUED | OUTPATIENT
Start: 2018-06-11 | End: 2018-06-11 | Stop reason: HOSPADM

## 2018-06-11 RX ORDER — HYDROMORPHONE HYDROCHLORIDE 1 MG/ML
0.5 INJECTION, SOLUTION INTRAMUSCULAR; INTRAVENOUS; SUBCUTANEOUS
Status: DISCONTINUED | OUTPATIENT
Start: 2018-06-11 | End: 2018-06-11 | Stop reason: HOSPADM

## 2018-06-11 RX ADMIN — SODIUM CHLORIDE 60 ML: 9 INJECTION, SOLUTION INTRAVENOUS at 09:00

## 2018-06-11 RX ADMIN — IOPAMIDOL 100 ML: 755 INJECTION, SOLUTION INTRAVENOUS at 08:59

## 2018-06-11 RX ADMIN — HYDROMORPHONE HYDROCHLORIDE 0.5 MG: 1 INJECTION, SOLUTION INTRAMUSCULAR; INTRAVENOUS; SUBCUTANEOUS at 08:09

## 2018-06-11 RX ADMIN — ONDANSETRON 4 MG: 2 INJECTION INTRAMUSCULAR; INTRAVENOUS at 08:09

## 2018-06-11 RX ADMIN — SODIUM CHLORIDE: 9 INJECTION, SOLUTION INTRAVENOUS at 06:53

## 2018-06-11 RX ADMIN — SODIUM CHLORIDE 1000 ML: 9 INJECTION, SOLUTION INTRAVENOUS at 10:28

## 2018-06-11 RX ADMIN — ONDANSETRON 4 MG: 2 INJECTION INTRAMUSCULAR; INTRAVENOUS at 07:05

## 2018-06-11 NOTE — ED TRIAGE NOTES
Pt presents with concerns of abdominal pain.  Pt states that she has been vomiting and had diarrhea which started at 2300.  Abdominal pain started at 2000 and got worse at 2300.  Pain is located in the right lower quadrant and the left pelvic area.  Migraine Excedrin at 0000.

## 2018-06-11 NOTE — ED PROVIDER NOTES
History     Chief Complaint   Patient presents with     Abdominal Pain     HPI  Leah Holloway is a 32 year old female who presents with abdominal pain.  She states this started at 8 PM last night, 12 hours ago.  She states it became more intense at 11:30 PM.  At 1:30 AM she endorses significant vomiting and diarrhea.  She has had no blood with her stool or emesis.  However, she has had about 10-12 loose stools and thrown up about 8 times.  She has had no measured fever.  She has no history of chronic abdominal pain or issues.  Pain is in the lower abdominal region.  She states her last menstrual period was approximately May 26 which is more or less on time but she has some normal irregularity to her periods.    Problem List:    Patient Active Problem List    Diagnosis Date Noted     Plica of knee, right 01/28/2016     Priority: Medium     Renal artery stenosis (H) 01/13/2016     Priority: Medium     Surgery in 2009       Chondromalacia patellae, right 09/10/2015     Priority: Medium     Synovitis of knee 08/13/2015     Priority: Medium     CARDIOVASCULAR SCREENING; LDL GOAL LESS THAN 160 10/31/2010     Priority: Medium     GERD (gastroesophageal reflux disease) 05/28/2008     Priority: Medium     Elevated blood pressure reading without diagnosis of hypertension 03/19/2008     Priority: Medium     Headache 03/19/2008     Priority: Medium     Problem list name updated by automated process. Provider to review       Syncope and collapse 03/19/2008     Priority: Medium        Past Medical History:    History reviewed. No pertinent past medical history.    Past Surgical History:    Past Surgical History:   Procedure Laterality Date     ARTHROSCOPY KNEE Right 1/22/2016    Procedure: ARTHROSCOPY KNEE;  Surgeon: Ian Jaimes MD;  Location: [facility] OR     GENITOURINARY SURGERY       HC KNEE SCOPE,PART SYNOVECT Right 1/22/16    Medial plica excision - WORK COMP       Family History:    Family History   Problem  Relation Age of Onset     Coronary Artery Disease No family hx of      Colon Cancer No family hx of      MENTAL ILLNESS No family hx of      Obesity No family hx of      OSTEOPOROSIS No family hx of      Depression No family hx of      CEREBROVASCULAR DISEASE No family hx of        Social History:  Marital Status:  Single [1]  Social History   Substance Use Topics     Smoking status: Never Smoker     Smokeless tobacco: Never Used      Comment: No smokers in home.     Alcohol use No        Medications:      cetirizine (ZYRTEC) 5 MG/5ML solution   FLONASE 50 MCG/ACT NA SUSP   IBUPROFEN PO   ondansetron (ZOFRAN ODT) 4 MG ODT tab   BP MONITOR-STETHOSCOPE KIT         Review of Systems  All other systems are reviewed and are negative    Physical Exam   BP: (!) 168/105  Heart Rate: 117  Temp: 99.6  F (37.6  C)  Resp: 20  Weight: 117 kg (258 lb)  SpO2: 99 %      Physical Exam   Constitutional: She appears well-developed and well-nourished. No distress.   HENT:   Head: Normocephalic and atraumatic.   Mouth/Throat: Oropharynx is clear and moist.   Eyes: Pupils are equal, round, and reactive to light. No scleral icterus.   Neck: Normal range of motion. Neck supple.   Cardiovascular: Normal rate, regular rhythm, normal heart sounds and intact distal pulses.    No murmur heard.  Pulmonary/Chest: No stridor. No respiratory distress. She has no wheezes. She has no rales.   Abdominal: Soft. There is tenderness (moderate) in the right lower quadrant and left lower quadrant. There is guarding. There is no rigidity and no rebound.   Musculoskeletal: She exhibits no edema or tenderness.   Neurological: She is alert.   Skin: Skin is warm and dry. No rash noted. She is not diaphoretic. No erythema. No pallor.   Psychiatric: She has a normal mood and affect.   Nursing note and vitals reviewed.      ED Course     ED Course     Procedures               Critical Care time:  none               Results for orders placed or performed during the  hospital encounter of 06/11/18 (from the past 24 hour(s))   CBC with platelets differential   Result Value Ref Range    WBC 11.7 (H) 4.0 - 11.0 10e9/L    RBC Count 4.78 3.8 - 5.2 10e12/L    Hemoglobin 14.2 11.7 - 15.7 g/dL    Hematocrit 41.0 35.0 - 47.0 %    MCV 86 78 - 100 fl    MCH 29.7 26.5 - 33.0 pg    MCHC 34.6 31.5 - 36.5 g/dL    RDW 13.4 10.0 - 15.0 %    Platelet Count 179 150 - 450 10e9/L    Diff Method Automated Method     % Neutrophils 86.2 %    % Lymphocytes 7.0 %    % Monocytes 6.2 %    % Eosinophils 0.4 %    % Basophils 0.2 %    Absolute Neutrophil 10.1 (H) 1.6 - 8.3 10e9/L    Absolute Lymphocytes 0.8 0.8 - 5.3 10e9/L    Absolute Monocytes 0.7 0.0 - 1.3 10e9/L    Absolute Eosinophils 0.1 0.0 - 0.7 10e9/L    Absolute Basophils 0.0 0.0 - 0.2 10e9/L   Basic metabolic panel   Result Value Ref Range    Sodium 142 133 - 144 mmol/L    Potassium 3.6 3.4 - 5.3 mmol/L    Chloride 108 94 - 109 mmol/L    Carbon Dioxide 24 20 - 32 mmol/L    Anion Gap 10 3 - 14 mmol/L    Glucose 127 (H) 70 - 99 mg/dL    Urea Nitrogen 10 7 - 30 mg/dL    Creatinine 0.84 0.52 - 1.04 mg/dL    GFR Estimate 78 >60 mL/min/1.7m2    GFR Estimate If Black >90 >60 mL/min/1.7m2    Calcium 8.4 (L) 8.5 - 10.1 mg/dL   HCG qualitative   Result Value Ref Range    HCG Qualitative Serum Negative NEG^Negative   Routine UA with microscopic   Result Value Ref Range    Color Urine Yellow     Appearance Urine Clear     Glucose Urine Negative NEG^Negative mg/dL    Bilirubin Urine Negative NEG^Negative    Ketones Urine Negative NEG^Negative mg/dL    Specific Gravity Urine 1.017 1.003 - 1.035    Blood Urine Negative NEG^Negative    pH Urine 7.0 5.0 - 7.0 pH    Protein Albumin Urine Negative NEG^Negative mg/dL    Urobilinogen mg/dL 0.0 0.0 - 2.0 mg/dL    Nitrite Urine Negative NEG^Negative    Leukocyte Esterase Urine Negative NEG^Negative    Source Unspecified Urine     WBC Urine 1 0 - 5 /HPF    RBC Urine 1 0 - 2 /HPF    Bacteria Urine Few (A) NEG^Negative /HPF     Squamous Epithelial /HPF Urine 2 (H) 0 - 1 /HPF   CT Abdomen Pelvis w Contrast    Narrative    CT ABDOMEN AND PELVIS WITH CONTRAST  6/11/2018 9:12 AM    HISTORY: Lower abdominal pain, (no water as vomiting), await negative  HCG.     TECHNIQUE: Scans obtained from the diaphragm through the pelvis with  oral and IV contrast, 82 mL Isovue-370.   Radiation dose for this scan was reduced using automated exposure  control, adjustment of the mA and/or kV according to patient size, or  iterative reconstruction technique.    COMPARISON:  None.    FINDINGS: There is mild dependent atelectasis. Visualized portions of  the lung bases are otherwise unremarkable. Visualized mediastinal  contents are unremarkable.    Benign bone island is seen in the left sacrum. No aggressive osseous  lesions are seen.      The liver, gallbladder, pancreas, spleen, bilateral adrenal glands and  bilateral kidneys enhance normally. No hydronephrosis,  nephrolithiasis, hydroureter or ureteral calculus is identified.  Urinary bladder is unremarkable.    No adenopathy, free fluid or free air seen in the peritoneal cavity.  The colon is of normal caliber without pericolonic inflammatory change  to suggest acute diverticulitis. Radiopaque material in the base of  the appendix could represent appendicolith. Appendix is otherwise  normal in appearance and demonstrates no adjacent inflammatory change  to suggest acute appendicitis. Small bowel does contain some fluid and  measures up to 2.2 cm in diameter in the pelvis. There is questionable  mild small bowel wall thickening in this region. Mild small bowel  ileus is possible, although no evidence for bowel obstruction is  present. Small bowel is otherwise unremarkable. Stomach is distended  with fluid likely due to ingestion for this procedure. Stomach is  otherwise unremarkable. Uterus is normal in appearance.    There are multiple cystic structures in the bilateral ovaries  measuring up to 2.0 cm in  diameter in the right ovary and 2.2 cm in  diameter in the left ovary. One in the right ovary has a thickened  enhancing wall which could represent a collapsing cyst. This measures  up to 1.8 cm in diameter.      Impression    IMPRESSION:  1. Mild prominence of the small bowel in the pelvis is fluid-filled  and may have some mild wall thickening. Small bowel ileus/enteritis is  not entirely excluded.  2. Multiple cystic structures bilateral ovaries likely represent  follicles. Probable collapsing cyst in the right ovary. If there is  clinical concern, further evaluation with pelvic ultrasound may be  helpful.  3. Etiology for patient's symptoms is otherwise not definitely  identified.    THANH MENCHACA MD   US Pelvis Cmpl wo Transvaginal w Abd/Pel Duplex Lmt    Narrative    ULTRASOUND PELVIS COMPLETE WITHOUT TRANSVAGINAL WITH ABDOMEN/PELVIC  DUPLEX LIMITED 6/11/2018 10:57 AM     HISTORY: Pelvic pain.      FINDINGS: Transabdominal imaging is performed. Transvaginal imaging  declined by the patient.    The uterus is normal in size measuring 7.9 x 5.4 x 4.1 cm. No fibroids  are evident. Endometrial stripe measures 8 mm and is normal for  patient's age. The right ovary measures 4.1 x 2.8 x 2.5 cm and  contains a dominant follicle measuring 2.0 cm. The left ovary measures  3.1 x 2.7 x 3.1 cm and contains a dominant follicle measuring 2.2 cm.  Color Doppler waveform analysis demonstrates normal arterial waveforms  within each ovary. No adnexal masses are present. A trace amount of  probable physiologic free pelvic fluid is present.      Impression    IMPRESSION: Bilateral dominant ovarian follicles as described with a  trace amount of free pelvic fluid likely physiologic. No ultrasound  findings to suggest ovarian torsion. Uterus and endometrial stripe are  within normal limits.    PHILL JARRETT MD       Medications   ondansetron (ZOFRAN) injection 4 mg (4 mg Intravenous Given 6/11/18 0705)   iopamidol (ISOVUE-370) solution  500 mL (100 mLs Intravenous Given by Other Clinician 6/11/18 0863)   sodium chloride (PF) 0.9% PF flush 3 mL (3 mLs Intravenous Given by Other Clinician 6/11/18 0875)   sodium chloride 0.9 % bag 100mL for CT scan flush use (60 mLs Intravenous Given by Other Clinician 6/11/18 0900)   0.9% sodium chloride BOLUS (0 mLs Intravenous Stopped 6/11/18 1142)       Assessments & Plan (with Medical Decision Making)  32-year-old female with vomiting and diarrhea.  Appears likely viral.  CT without significant abnormality other than some ovarian cystic-like abnormalities.  Ultrasound does not show torsion or other worrisome finding.  No evidence in the workup for appendicitis.  This should resolve over the next 24-48 hours.  Follow-up with the emergency department if this does not occur or sooner if condition worsens.     I have reviewed the nursing notes.    I have reviewed the findings, diagnosis, plan and need for follow up with the patient.       Discharge Medication List as of 6/11/2018 11:42 AM      START taking these medications    Details   ondansetron (ZOFRAN ODT) 4 MG ODT tab Take 1 tablet (4 mg) by mouth every 6 hours as needed for nausea, Disp-6 tablet, R-0, E-Prescribe             Final diagnoses:   Gastroenteritis       6/11/2018   [facility] EMERGENCY DEPARTMENT     Homer Joaquin MD  06/11/18 4254

## 2018-06-11 NOTE — ED AVS SNAPSHOT
" [facility] Emergency Department    [address] [place] [address]    Phone:  [phone]    Fax:  [phone]                                       Leah Holloway   MRN: [id number]    Department:  [facility] Emergency Department   Date of Visit:  6/11/2018           Patient Information     Date Of Birth          1985        Your diagnoses for this visit were:     Gastroenteritis        You were seen by Homer Joaquin MD.        Discharge Instructions          * FOOD POISONING or VIRAL GASTROENTERITIS (6yr-Adult)  FOOD POISONING may occur from 6 to 24 hours after eating food that has spoiled and lasts up to1-2 days. VIRAL GASTRO-ENTERITIS is commonly known as the \"stomach flu\" and may last 2-7 days. Symptoms of both illnesses may include vomiting, diarrhea, fever, abdominal cramping. Antibiotics are not effective, but simple home treatment will be helpful.  HOME CARE:      If symptoms are severe, rest at home for the next 24 hours.    Avoid tobacco and alcohol. These may worsen your symptoms.    If medicines for diarrhea (low dose of Immodium: one tablet a day for an adult) or vomiting were prescribed, take only as directed.   During the first 12 to 24 hours follow the diet below:    DRINKS: Sport drinks like Gatorade, soft drinks without caffeine; ginger ale, mineral water (plain or flavored), decaffeinated tea and coffee.    SOUPS: Clear broth, consommé and bouillon    DESSERTS: Plain gelatin (Jell-O), popsicles and fruit juice bars.  During the next 24 hours you may add the following to the above:    Hot cereal, plain toast, bread, rolls, crackers    Plain noodles, rice, mashed potatoes, chicken noodle or rice soup    Unsweetened canned fruit (avoid pineapple), bananas    Limit fat intake to less than 15 grams per day by avoiding margarine, butter, oils, mayonnaise, sauces, gravies, fried foods, peanut butter, meat, poultry and fish.    Limit fiber; avoid raw or cooked " vegetables, fresh fruits (except bananas) and bran cereals.    Limit caffeine and chocolate. No spices or seasonings except salt.  Slowly go back to a normal diet as you feel better and your symptoms lessen.  FOLLOW UP with your doctor as advised if you are not better in 2 days. If a stool (diarrhea) sample was taken, you may call in 2 days (or as directed) for the results.  GET PROMPT MEDICAL ATTENTION if any of the following occur:    Increasing abdominal pain or constant pain in one spot    Continued vomiting (unable to keep liquids down)    Frequent diarrhea (more than 5 times a day)    Blood in vomit or stool (black or red color)    Unable to take in fluids at all    No urine output for 12 hours or extreme thirst    Weakness, dizziness, fainting    Drowsiness, confusion, stiff neck or seizure    Fever over 101.0  F (38.3  C) for more than 3 days    New rash    8524-0026 [employer]. [address] [place] [address]. All rights reserved. This information is not intended as a substitute for professional medical care. Always follow your healthcare professional's instructions.  This information has been modified by your health care provider with permission from the publisher.      24 Hour Appointment Hotline       To make an appointment at any [facility], call [phone] ([phone]). If you don't have a family doctor or clinic, we will help you find one. [facility] clinics are conveniently located to serve the needs of you and your family.             Review of your medicines      START taking        Dose / Directions Last dose taken    ondansetron 4 MG ODT tab   Commonly known as:  ZOFRAN ODT   Dose:  4 mg   Quantity:  6 tablet        Take 1 tablet (4 mg) by mouth every 6 hours as needed for nausea   Refills:  0          Our records show that you are taking the medicines listed below. If these are incorrect, please call your family doctor or clinic.        Dose / Directions Last  dose taken    BP Monitor-Stethoscope Kit   Quantity:  1 kit        test as directed   Refills:  0        cetirizine 5 MG/5ML solution   Commonly known as:  zyrTEC   Dose:  10 mg   Quantity:  118 mL        Take 10 mLs (10 mg) by mouth 2 times daily as needed for allergies (hives, or vertigo)   Refills:  0        FLONASE 50 MCG/ACT spray   Quantity:  3 Bottle   Generic drug:  fluticasone        USE 2 SPRAYS IN EACH NOSTRIL ONCE DAILY   Refills:  3        IBUPROFEN PO   Dose:  200 mg        Take 200 mg by mouth   Refills:  0                Prescriptions were sent or printed at these locations (1 Prescription)                   [facility], [place] - [address]    [address] , [facility] [address]    Telephone:  [phone]   Fax:  [phone]   Hours:                  E-Prescribed (1 of 1)         ondansetron (ZOFRAN ODT) 4 MG ODT tab                Procedures and tests performed during your visit     Basic metabolic panel    CBC with platelets differential    CT Abdomen Pelvis w Contrast    EKG 12-lead, tracing only    HCG qualitative    Lactic acid whole blood    Peripheral IV: Standard    Routine UA with microscopic    US Pelvis Cmpl wo Transvaginal w Abd/Pel Duplex Lmt      Orders Needing Specimen Collection     None      Pending Results     Date and Time Order Name Status Description    6/11/2018 0735 CT Abdomen Pelvis w Contrast Preliminary             Pending Culture Results     No orders found from 6/9/2018 to 6/12/2018.            Pending Results Instructions     If you had any lab results that were not finalized at the time of your Discharge, you can call the ED Lab Result RN at [phone]. You will be contacted by this team for any positive Lab results or changes in treatment. The nurses are available 7 days a week from 10A to 6:30P.  You can leave a message 24 hours per day and they will return your call.        Thank you for choosing [facility]       Thank you for choosing  "[facility] for your care. Our goal is always to provide you with excellent care. Hearing back from our patients is one way we can continue to improve our services. Please take a few minutes to complete the written survey that you may receive in the mail after you visit with us. Thank you!        [employer] Information     [employer] lets you send messages to your doctor, view your test results, renew your prescriptions, schedule appointments and more. To sign up, go to [email or web address] . Click on \"Log in\" on the left side of the screen, which will take you to the Welcome page. Then click on \"Sign up Now\" on the right side of the page.     You will be asked to enter the access code listed below, as well as some personal information. Please follow the directions to create your username and password.     Your access code is: [email or web address]  Expires: 2018  1:54 PM     Your access code will  in 90 days. If you need help or a new code, please call your [facility] clinic or [phone].        Care EveryWhere ID     This is your Care EveryWhere ID. This could be used by other organizations to access your [facility] medical records  [id number]        Equal Access to Services     ERIS MEDINA : Nico Ortiz, doreen ramirez, qanery jeffery, keiko rodriguez. So [facility] [phone].    ATENCIÓN: Si habla español, tiene a george disposición servicios gratuitos de asistencia lingüística. Llame al [phone].    We comply with applicable federal civil rights laws and [place] laws. We do not discriminate on the basis of race, color, national origin, age, disability, sex, sexual orientation, or gender identity.            After Visit Summary       This is your record. Keep this with you and show to your community pharmacist(s) and doctor(s) at your next visit.                  "

## 2018-06-11 NOTE — DISCHARGE INSTRUCTIONS
"   * FOOD POISONING or VIRAL GASTROENTERITIS (6yr-Adult)  FOOD POISONING may occur from 6 to 24 hours after eating food that has spoiled and lasts up to1-2 days. VIRAL GASTRO-ENTERITIS is commonly known as the \"stomach flu\" and may last 2-7 days. Symptoms of both illnesses may include vomiting, diarrhea, fever, abdominal cramping. Antibiotics are not effective, but simple home treatment will be helpful.  HOME CARE:      If symptoms are severe, rest at home for the next 24 hours.    Avoid tobacco and alcohol. These may worsen your symptoms.    If medicines for diarrhea (low dose of Immodium: one tablet a day for an adult) or vomiting were prescribed, take only as directed.   During the first 12 to 24 hours follow the diet below:    DRINKS: Sport drinks like Gatorade, soft drinks without caffeine; ginger ale, mineral water (plain or flavored), decaffeinated tea and coffee.    SOUPS: Clear broth, consommé and bouillon    DESSERTS: Plain gelatin (Jell-O), popsicles and fruit juice bars.  During the next 24 hours you may add the following to the above:    Hot cereal, plain toast, bread, rolls, crackers    Plain noodles, rice, mashed potatoes, chicken noodle or rice soup    Unsweetened canned fruit (avoid pineapple), bananas    Limit fat intake to less than 15 grams per day by avoiding margarine, butter, oils, mayonnaise, sauces, gravies, fried foods, peanut butter, meat, poultry and fish.    Limit fiber; avoid raw or cooked vegetables, fresh fruits (except bananas) and bran cereals.    Limit caffeine and chocolate. No spices or seasonings except salt.  Slowly go back to a normal diet as you feel better and your symptoms lessen.  FOLLOW UP with your doctor as advised if you are not better in 2 days. If a stool (diarrhea) sample was taken, you may call in 2 days (or as directed) for the results.  GET PROMPT MEDICAL ATTENTION if any of the following occur:    Increasing abdominal pain or constant pain in one " spot    Continued vomiting (unable to keep liquids down)    Frequent diarrhea (more than 5 times a day)    Blood in vomit or stool (black or red color)    Unable to take in fluids at all    No urine output for 12 hours or extreme thirst    Weakness, dizziness, fainting    Drowsiness, confusion, stiff neck or seizure    Fever over 101.0  F (38.3  C) for more than 3 days    New rash    2802-5619 [employer]. [address] [place] [address], [place] [address]. All rights reserved. This information is not intended as a substitute for professional medical care. Always follow your healthcare professional's instructions.  This information has been modified by your health care provider with permission from the publisher.

## 2018-06-11 NOTE — ED AVS SNAPSHOT
[facility] Emergency Department    [address] [place] [address]    Phone:  [phone]    Fax:  [phone]                                       Leah Holloway   MRN: [id number]    Department:  [facility] Emergency Department   Date of Visit:  6/11/2018           After Visit Summary Signature Page     I have received my discharge instructions, and my questions have been answered. I have discussed any challenges I see with this plan with the nurse or doctor.    ..........................................................................................................................................  Patient/Patient Representative Signature      ..........................................................................................................................................  Patient Representative Print Name and Relationship to Patient    ..................................................               ................................................  Date                                            Time    ..........................................................................................................................................  Reviewed by Signature/Title    ...................................................              ..............................................  Date                                                            Time

## 2018-06-11 NOTE — ED NOTES
"Rounded on patient for primary nurse to assess how she was doing after drinking her apple juice.  Pt verbalized that she now is nauseous with \"chest tightness\" and forearm pain on her left arm below the IV access site.  Reported this off to Dr Joaquin her primary provider and Diana her primary nurse.   "

## 2018-06-11 NOTE — LETTER
June 11, 2018      To Whom It May Concern:      Leah Holloway was seen in our Emergency Department today, 06/11/18.  I expect her condition to improve over the next 3 days.  She may return to work when improved.    Sincerely,        Homer Joaquin MD

## 2020-06-24 ENCOUNTER — APPOINTMENT (OUTPATIENT)
Dept: CT IMAGING | Facility: CLINIC | Age: 35
End: 2020-06-24
Attending: EMERGENCY MEDICINE
Payer: COMMERCIAL

## 2020-06-24 ENCOUNTER — HOSPITAL ENCOUNTER (EMERGENCY)
Facility: CLINIC | Age: 35
Discharge: HOME | End: 2020-06-24
Attending: EMERGENCY MEDICINE | Admitting: EMERGENCY MEDICINE
Payer: COMMERCIAL

## 2020-06-24 VITALS
DIASTOLIC BLOOD PRESSURE: 108 MMHG | RESPIRATION RATE: 17 BRPM | SYSTOLIC BLOOD PRESSURE: 173 MMHG | TEMPERATURE: 96 F | HEART RATE: 79 BPM | WEIGHT: 250 LBS | OXYGEN SATURATION: 95 % | BODY MASS INDEX: 36.39 KG/M2
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
## 2020-06-24 DIAGNOSIS — S30.1XXA CONTUSION OF ABDOMINAL WALL, INITIAL ENCOUNTER: ICD-10-CM

## 2020-06-24 LAB
ALBUMIN SERPL-MCNC: 3.9 G/DL (ref 3.4–5)
ALP SERPL-CCNC: 61 U/L (ref 40–150)
ALT SERPL W P-5'-P-CCNC: 18 U/L (ref 0–50)
ANION GAP SERPL CALCULATED.3IONS-SCNC: 3 MMOL/L (ref 3–14)
AST SERPL W P-5'-P-CCNC: 8 U/L (ref 0–45)
BASOPHILS # BLD AUTO: 0 10E9/L (ref 0–0.2)
BASOPHILS NFR BLD AUTO: 0.3 %
BILIRUB SERPL-MCNC: 0.3 MG/DL (ref 0.2–1.3)
BUN SERPL-MCNC: 12 MG/DL (ref 7–30)
CALCIUM SERPL-MCNC: 8.4 MG/DL (ref 8.5–10.1)
CHLORIDE SERPL-SCNC: 109 MMOL/L (ref 94–109)
CO2 SERPL-SCNC: 29 MMOL/L (ref 20–32)
CREAT SERPL-MCNC: 0.73 MG/DL (ref 0.52–1.04)
DIFFERENTIAL METHOD BLD: NORMAL
EOSINOPHIL NFR BLD AUTO: 1.1 %
ERYTHROCYTE [DISTWIDTH] IN BLOOD BY AUTOMATED COUNT: 12.2 % (ref 10–15)
GFR SERPL CREATININE-BSD FRML MDRD: >90 ML/MIN/{1.73_M2}
GLUCOSE SERPL-MCNC: 96 MG/DL (ref 70–99)
HCG SERPL QL: NEGATIVE
HCT VFR BLD AUTO: 40.8 % (ref 35–47)
HGB BLD-MCNC: 13.7 G/DL (ref 11.7–15.7)
IMM GRANULOCYTES # BLD: 0 10E9/L (ref 0–0.4)
IMM GRANULOCYTES NFR BLD: 0.3 %
LYMPHOCYTES # BLD AUTO: 3.4 10E9/L (ref 0.8–5.3)
LYMPHOCYTES NFR BLD AUTO: 33.7 %
MCH RBC QN AUTO: 28.9 PG (ref 26.5–33)
MCHC RBC AUTO-ENTMCNC: 33.6 G/DL (ref 31.5–36.5)
MCV RBC AUTO: 86 FL (ref 78–100)
MONOCYTES # BLD AUTO: 0.7 10E9/L (ref 0–1.3)
MONOCYTES NFR BLD AUTO: 7.3 %
NEUTROPHILS # BLD AUTO: 5.7 10E9/L (ref 1.6–8.3)
NEUTROPHILS NFR BLD AUTO: 57.3 %
NRBC # BLD AUTO: 0 10*3/UL
NRBC BLD AUTO-RTO: 0 /100
PLATELET # BLD AUTO: 244 10E9/L (ref 150–450)
POTASSIUM SERPL-SCNC: 3.3 MMOL/L (ref 3.4–5.3)
PROT SERPL-MCNC: 7.3 G/DL (ref 6.8–8.8)
RBC # BLD AUTO: 4.74 10E12/L (ref 3.8–5.2)
SODIUM SERPL-SCNC: 141 MMOL/L (ref 133–144)
WBC # BLD AUTO: 10 10E9/L (ref 4–11)

## 2020-06-24 PROCEDURE — 80053 COMPREHEN METABOLIC PANEL: CPT | Performed by: EMERGENCY MEDICINE

## 2020-06-24 PROCEDURE — 84703 CHORIONIC GONADOTROPIN ASSAY: CPT | Performed by: EMERGENCY MEDICINE

## 2020-06-24 PROCEDURE — 25000125 ZZHC RX 250: Performed by: EMERGENCY MEDICINE

## 2020-06-24 PROCEDURE — 96375 TX/PRO/DX INJ NEW DRUG ADDON: CPT | Performed by: EMERGENCY MEDICINE

## 2020-06-24 PROCEDURE — 25000128 H RX IP 250 OP 636: Performed by: EMERGENCY MEDICINE

## 2020-06-24 PROCEDURE — 74177 CT ABD & PELVIS W/CONTRAST: CPT

## 2020-06-24 PROCEDURE — 99285 EMERGENCY DEPT VISIT HI MDM: CPT | Mod: 25 | Performed by: EMERGENCY MEDICINE

## 2020-06-24 PROCEDURE — 99285 EMERGENCY DEPT VISIT HI MDM: CPT | Mod: Z6 | Performed by: EMERGENCY MEDICINE

## 2020-06-24 PROCEDURE — 85025 COMPLETE CBC W/AUTO DIFF WBC: CPT | Performed by: EMERGENCY MEDICINE

## 2020-06-24 PROCEDURE — 96374 THER/PROPH/DIAG INJ IV PUSH: CPT | Performed by: EMERGENCY MEDICINE

## 2020-06-24 RX ORDER — HYDROMORPHONE HYDROCHLORIDE 1 MG/ML
0.5 INJECTION, SOLUTION INTRAMUSCULAR; INTRAVENOUS; SUBCUTANEOUS
Status: DISCONTINUED | OUTPATIENT
Start: 2020-06-24 | End: 2020-06-24 | Stop reason: HOSPADM

## 2020-06-24 RX ORDER — IOPAMIDOL 755 MG/ML
500 INJECTION, SOLUTION INTRAVASCULAR ONCE
Status: COMPLETED | OUTPATIENT
Start: 2020-06-24 | End: 2020-06-24

## 2020-06-24 RX ORDER — ONDANSETRON 2 MG/ML
4 INJECTION INTRAMUSCULAR; INTRAVENOUS EVERY 30 MIN PRN
Status: DISCONTINUED | OUTPATIENT
Start: 2020-06-24 | End: 2020-06-24 | Stop reason: HOSPADM

## 2020-06-24 RX ADMIN — IOPAMIDOL 99 ML: 755 INJECTION, SOLUTION INTRAVENOUS at 20:42

## 2020-06-24 RX ADMIN — ONDANSETRON 4 MG: 2 INJECTION INTRAMUSCULAR; INTRAVENOUS at 20:33

## 2020-06-24 RX ADMIN — HYDROMORPHONE HYDROCHLORIDE 0.5 MG: 1 INJECTION, SOLUTION INTRAMUSCULAR; INTRAVENOUS; SUBCUTANEOUS at 20:33

## 2020-06-24 RX ADMIN — SODIUM CHLORIDE 60 ML: 9 INJECTION, SOLUTION INTRAVENOUS at 20:43

## 2020-06-24 NOTE — ED AVS SNAPSHOT
[facility] Emergency Department  [address] [place] [address]  Phone:  [phone]  Fax:  [phone]                                    Leah Holloway   MRN: [id number]    Department:  [facility] Emergency Department   Date of Visit:  6/24/2020           After Visit Summary Signature Page    I have received my discharge instructions, and my questions have been answered. I have discussed any challenges I see with this plan with the nurse or doctor.    ..........................................................................................................................................  Patient/Patient Representative Signature      ..........................................................................................................................................  Patient Representative Print Name and Relationship to Patient    ..................................................               ................................................  Date                                   Time    ..........................................................................................................................................  Reviewed by Signature/Title    ...................................................              ..............................................  Date                                               Time          22EPIC Rev 08/18
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
## 2020-08-04 ENCOUNTER — VIRTUAL VISIT (OUTPATIENT)
Dept: FAMILY MEDICINE | Facility: OTHER | Age: 35
End: 2020-08-04

## 2020-08-04 NOTE — PROGRESS NOTES
"Date: 2020 09:44:12  Clinician: Rodrigue Grossman  Clinician NPI: [id number]  Patient: Leah anne  Patient : 1985  Patient Address: [address] [place] [address]  Patient Phone: [phone]  Visit Protocol: Allergic rhinitis  Patient Summary:  Leah is a 34 year old ( : 1985 ) female who initiated a Visit for evaluation of seasonal allergies.  When asked the question \"Please sign me up to receive news, health information and promotions. \", Leah responded \"No\".    Leah currently has allergy symptoms and experiences allergies every year.   Symptom details  Her current symptoms started less than a week ago and consist of sneezing, rhinitis, nausea, vomiting, and/or diarrhea, headache, scratchy throat, itching of the roof of the mouth, postnasal drip, sore throat, facial pain or pressure, eye redness (bloodshot eyes), itchy eyes, and itchy nose. She is experiencing difficulty breathing due to nasal congestion but is not short of breath.      Nasal secretions: The color of her mucus is green.    Headache: Her headache is moderate (4-6 on a 10 point pain scale).     Sore throat: Leah reports having severe throat pain (7-9 on a 10 point pain scale).      Note: Possible COVID-19 symptoms are highlighted in red.  The following allergens make her symptoms worse:     Cigarette smoke    Dust or dust mites    Mold    Perfume    Pollens from trees or grass    Ragweed    Other (free text): Strong smells        Denied symptoms include wheezing, ageusia, anosmia, itching of the inner ear, myalgia, new or worsening cough, more tears than usual, and dry eyes. She does not feel feverish.   Pertinent medical history  Leah does not have nasal ulcers or perforations.   Leah has tried allergy treatments. Medication(s) used to treat allergy symptoms as reported by the patient (free text): Flonase and machines 2 days not so far   Leah does not smoke or use smokeless tobacco.   She denies " pregnancy and denies breastfeeding. She is currently menstruating.     MEDICATIONS: Mucus Relief Severe Sinus Congestion oral, ALLERGIES: Benadryl Allergy  Clinician Response:  Dear Leah,  Based on the information you have provided, you have allergic rhinoconjunctivitis, commonly called hay fever or seasonal allergies.   Medication information  I am prescribing:       Mometasone (Nasonex) 50 mcg/actuation nasal spray. Inhale 2 sprays in each nostril 1 time per day. Your prescription includes 6 refills.      Olopatadine (Patanol) 0.1% ophthalmic (eye) drops. Apply 1 drop into affected eye(s) 2 times per day. Your prescrition includes 2 refills.     Unless you are allergic to the over-the-counter medication(s) below, I recommend using:     Cetirizine (Zyrtec) 10 mg oral tablet to treat your allergy symptoms. Take 1 tablet by mouth 1 time per day.   Over-the-counter medications do not require a prescription. Ask the pharmacist if you have any questions.  Self care  Please take the following precautions to help reduce your symptoms:       Avoid substances you are allergic to    Try to reduce the amount of humidity in your living and working environments    Consider moving pets outside of your living and/or working area    You can decrease your allergy symptoms by staying indoors as much as possible until your allergy season is over.     When to seek care  Please make an appointment to be seen in a clinic or urgent care if any of the following occur:     Your allergy symptoms haven't improved after taking the recommended medication for 2 weeks    New symptoms develop, or symptoms become worse      Diagnosis: Allergic rhinoconjunctivitis  Diagnosis ICD: J30.1  Prescription: mometasone (Nasonex) 50 mcg/actuation nasal spray,non-aerosol 1 120 spray canister, 30 days supply. Inhale 2 sprays in each nostril 1 time per day. Refills: 6, Refill as needed: no, Allow substitutions: yes  Prescription: olopatadine (Patanol) 0.1 %  ophthalmic (eye) drops 1 5 ml dropper bottle, 30 days supply. Apply 1 drop into affected eye(s) 2 times per day. Refills: 2, Refill as needed: no, Allow substitutions: yes  Pharmacy: [id number] - [phone] - [address] [place] [address]

## 2020-08-07 ENCOUNTER — APPOINTMENT (OUTPATIENT)
Dept: GENERAL RADIOLOGY | Facility: CLINIC | Age: 35
End: 2020-08-07
Attending: EMERGENCY MEDICINE
Payer: COMMERCIAL

## 2020-08-07 ENCOUNTER — HOSPITAL ENCOUNTER (EMERGENCY)
Facility: CLINIC | Age: 35
Discharge: HOME | End: 2020-08-07
Attending: EMERGENCY MEDICINE | Admitting: EMERGENCY MEDICINE
Payer: COMMERCIAL

## 2020-08-07 VITALS
HEART RATE: 96 BPM | SYSTOLIC BLOOD PRESSURE: 177 MMHG | RESPIRATION RATE: 18 BRPM | DIASTOLIC BLOOD PRESSURE: 114 MMHG | TEMPERATURE: 98.5 F | OXYGEN SATURATION: 100 %

## 2020-08-07 DIAGNOSIS — J45.901 REACTIVE AIRWAY DISEASE WITH WHEEZING WITH ACUTE EXACERBATION, UNSPECIFIED ASTHMA SEVERITY, UNSPECIFIED WHETHER PERSISTENT: ICD-10-CM

## 2020-08-07 DIAGNOSIS — J30.2 SEASONAL ALLERGIC RHINITIS, UNSPECIFIED TRIGGER: ICD-10-CM

## 2020-08-07 PROCEDURE — 25000125 ZZHC RX 250: Performed by: EMERGENCY MEDICINE

## 2020-08-07 PROCEDURE — 99283 EMERGENCY DEPT VISIT LOW MDM: CPT | Mod: 25 | Performed by: EMERGENCY MEDICINE

## 2020-08-07 PROCEDURE — 94640 AIRWAY INHALATION TREATMENT: CPT | Performed by: EMERGENCY MEDICINE

## 2020-08-07 PROCEDURE — 25000132 ZZH RX MED GY IP 250 OP 250 PS 637: Performed by: EMERGENCY MEDICINE

## 2020-08-07 PROCEDURE — 71045 X-RAY EXAM CHEST 1 VIEW: CPT | Mod: TC

## 2020-08-07 PROCEDURE — 99284 EMERGENCY DEPT VISIT MOD MDM: CPT | Mod: Z6 | Performed by: EMERGENCY MEDICINE

## 2020-08-07 RX ORDER — DEXAMETHASONE SODIUM PHOSPHATE 10 MG/ML
10 INJECTION, SOLUTION INTRAMUSCULAR; INTRAVENOUS ONCE
Status: COMPLETED | OUTPATIENT
Start: 2020-08-07 | End: 2020-08-07

## 2020-08-07 RX ORDER — METHYLPREDNISOLONE 4 MG
TABLET, DOSE PACK ORAL
Qty: 21 TABLET | Refills: 0 | Status: SHIPPED | OUTPATIENT
Start: 2020-08-07 | End: 2022-07-18

## 2020-08-07 RX ORDER — ALBUTEROL SULFATE 90 UG/1
6 AEROSOL, METERED RESPIRATORY (INHALATION) ONCE
Status: COMPLETED | OUTPATIENT
Start: 2020-08-07 | End: 2020-08-07

## 2020-08-07 RX ORDER — METHYLPREDNISOLONE 4 MG
TABLET, DOSE PACK ORAL
Qty: 21 TABLET | Refills: 0 | Status: SHIPPED | OUTPATIENT
Start: 2020-08-07 | End: 2020-08-07

## 2020-08-07 RX ADMIN — DEXAMETHASONE SODIUM PHOSPHATE 10 MG: 10 INJECTION, SOLUTION INTRAMUSCULAR; INTRAVENOUS at 06:34

## 2020-08-07 RX ADMIN — ALBUTEROL SULFATE 6 PUFF: 90 AEROSOL, METERED RESPIRATORY (INHALATION) at 06:34

## 2020-08-07 NOTE — ED PROVIDER NOTES
History     Chief Complaint   Patient presents with     Shortness of Breath     HPI  Leah Holloway is a 34 year old female who presents to the ER with cough, shortness of breath, and loss of voice.  He has a history of seasonal allergies, and says that sometimes when they flare she gets symptoms similar to this.  She works night shift, and when she went outside this evening she started coughing.  It is sometimes productive of clear sputum.  She feels short of breath because she coughs so much.  She also has lost her voice and has to speak in a whisper.  Denies any fever, loss of taste, loss of smell, known COVID positive contacts, no chest pain, no sore throat.  She tried taking an over-the-counter medication, cannot recall what it was, to try and help with her symptoms last night but it did not improve them.  She also says that she has never been officially diagnosed with asthma, but believes that she has it.    Allergies:  Allergies   Allergen Reactions     Aluminum Hives     Reaction when pt comes into contact with aluminum.     Benadryl [Anti-Itch]      No Clinical Screening - See Comments Rash     Adhesives       Problem List:    Patient Active Problem List    Diagnosis Date Noted     Plica of knee, right 01/28/2016     Priority: Medium     Renal artery stenosis (H) 01/13/2016     Priority: Medium     Surgery in 2009       Chondromalacia patellae, right 09/10/2015     Priority: Medium     Synovitis of knee 08/13/2015     Priority: Medium     CARDIOVASCULAR SCREENING; LDL GOAL LESS THAN 160 10/31/2010     Priority: Medium     GERD (gastroesophageal reflux disease) 05/28/2008     Priority: Medium     Elevated blood pressure reading without diagnosis of hypertension 03/19/2008     Priority: Medium     Headache 03/19/2008     Priority: Medium     Problem list name updated by automated process. Provider to review       Syncope and collapse 03/19/2008     Priority: Medium        Past Medical History:    No past  medical history on file.    Past Surgical History:    Past Surgical History:   Procedure Laterality Date     ARTHROSCOPY KNEE Right 1/22/2016    Procedure: ARTHROSCOPY KNEE;  Surgeon: Ian Jaimes MD;  Location: [facility] OR     GENITOURINARY SURGERY       HC KNEE SCOPE,PART SYNOVECT Right 1/22/16    Medial plica excision - WORK COMP       Family History:    Family History   Problem Relation Age of Onset     Coronary Artery Disease No family hx of      Colon Cancer No family hx of      Mental Illness No family hx of      Obesity No family hx of      Osteoporosis No family hx of      Depression No family hx of      Cerebrovascular Disease No family hx of        Social History:  Marital Status:  Single [1]  Social History     Tobacco Use     Smoking status: Never Smoker     Smokeless tobacco: Never Used     Tobacco comment: No smokers in home.   Substance Use Topics     Alcohol use: No     Drug use: No        Medications:    methylPREDNISolone (MEDROL DOSEPAK) 4 MG tablet therapy pack  BP MONITOR-STETHOSCOPE KIT  cetirizine (ZYRTEC) 5 MG/5ML solution  FLONASE 50 MCG/ACT NA SUSP  IBUPROFEN PO          Review of Systems   All other systems reviewed and are negative.      Physical Exam   BP: (!) 177/114  Pulse: 96  Temp: 98.5  F (36.9  C)  Resp: 18  SpO2: 100 %      Physical Exam  Vitals signs and nursing note reviewed.   Constitutional:       General: She is not in acute distress.     Appearance: She is obese. She is not diaphoretic.   HENT:      Head: Atraumatic.      Mouth/Throat:      Mouth: Mucous membranes are moist.      Pharynx: No oropharyngeal exudate.   Eyes:      General: No scleral icterus.     Pupils: Pupils are equal, round, and reactive to light.   Neck:      Musculoskeletal: Normal range of motion and neck supple.   Cardiovascular:      Heart sounds: Normal heart sounds.   Pulmonary:      Effort: Pulmonary effort is normal. No respiratory distress.      Breath sounds: Examination of the right-upper  field reveals wheezing. Examination of the left-upper field reveals wheezing. Examination of the right-middle field reveals wheezing. Examination of the left-middle field reveals wheezing. Examination of the right-lower field reveals wheezing. Examination of the left-lower field reveals wheezing. Wheezing present.   Abdominal:      General: Bowel sounds are normal.      Palpations: Abdomen is soft.      Tenderness: There is no abdominal tenderness.   Musculoskeletal:         General: No tenderness.   Skin:     General: Skin is warm.      Findings: No rash.   Neurological:      Mental Status: She is alert.         ED Course        Procedures               Critical Care time:  none               Results for orders placed or performed during the hospital encounter of 08/07/20 (from the past 24 hour(s))   XR Chest Port 1 View    Narrative    EXAM: XR CHEST PORT 1 VW  LOCATION: [facility]  DATE/TIME: 8/7/2020 6:23 AM    INDICATION: Cough.  COMPARISON: None.      Impression    IMPRESSION: Heart size is normal. Lungs are clear. No visible pneumothorax or pleural effusion.       Medications   albuterol (PROAIR HFA/PROVENTIL HFA/VENTOLIN HFA) 108 (90 Base) MCG/ACT inhaler 6 puff (6 puffs Inhalation Given 8/7/20 0634)   dexamethasone (DECADRON) PF oral solution (inj used orally) 10 mg (10 mg Oral Given 8/7/20 0634)       Assessments & Plan (with Medical Decision Making)  Leah is a 34-year-old female with past medical history of seasonal allergies and undiagnosed asthma who presents to the ER with cough and hoarseness.  She says that this usually occurs when she has a severe flare of her allergies.  See history and focused physical exam as above  Well-appearing obese female in no acute distress.  Incidentally noted to be hypertensive, but is afebrile, and oxygen saturation is 100% on room air.  She is not diaphoretic or tachypneic.  Has expiratory wheezing in all lung fields.  Will obtain a chest x-ray and  give albuterol MDI, as well as oral Decadron  Chest x-ray results as above.  No evidence of consolidation, pleural effusion, pneumothorax.  She feels better after receiving 6 puffs of albuterol and 10 mg oral Decadron.  Will give a prescription for steroid taper and will let her leave with the inhaler.  She is to follow-up closely with her primary provider and continue all of her medications that have been prescribed for seasonal allergies.  If she has any new or worsening symptoms is to return to the ER for evaluation.  She understands and agrees.  Comfortable with plan for discharge at this time.  Discharged in no acute distress     I have reviewed the nursing notes.    I have reviewed the findings, diagnosis, plan and need for follow up with the patient.       Discharge Medication List as of 8/7/2020  7:05 AM      START taking these medications    Details   methylPREDNISolone (MEDROL DOSEPAK) 4 MG tablet therapy pack Follow Package Directions, Disp-21 tablet,R-0, E-Prescribe             Final diagnoses:   Seasonal allergic rhinitis, unspecified trigger   Reactive airway disease with wheezing with acute exacerbation, unspecified asthma severity, unspecified whether persistent       8/7/2020   [facility] EMERGENCY DEPARTMENT     Lien Bonilla,   08/07/20 3632

## 2020-08-07 NOTE — ED AVS SNAPSHOT
[facility] Emergency Department  [address] [place] [address]  Phone:  [phone]  Fax:  [phone]                                    Leah Holloway   MRN: [id number]    Department:  [facility] Emergency Department   Date of Visit:  8/7/2020           After Visit Summary Signature Page    I have received my discharge instructions, and my questions have been answered. I have discussed any challenges I see with this plan with the nurse or doctor.    ..........................................................................................................................................  Patient/Patient Representative Signature      ..........................................................................................................................................  Patient Representative Print Name and Relationship to Patient    ..................................................               ................................................  Date                                   Time    ..........................................................................................................................................  Reviewed by Signature/Title    ...................................................              ..............................................  Date                                               Time          22EPIC Rev 08/18

## 2020-08-07 NOTE — DISCHARGE INSTRUCTIONS
You were given a dose of steroids in the ER today.  You may start the steroid taper when you are able to pick it up from the pharmacy    You may use the albuterol inhaler given to you in the ER every 4-6 hours as needed for cough and shortness of breath    Follow-up with your primary provider as needed    Return to the ER if symptoms worsen, if you have difficulty breathing, if you develop a fever, or have any new or concerning symptoms    Continue your medications as previously prescribed, no other changes were made today.

## 2021-01-24 ENCOUNTER — HOSPITAL ENCOUNTER (EMERGENCY)
Facility: CLINIC | Age: 36
Discharge: HOME | End: 2021-01-24
Attending: FAMILY MEDICINE | Admitting: FAMILY MEDICINE
Payer: COMMERCIAL

## 2021-01-24 ENCOUNTER — APPOINTMENT (OUTPATIENT)
Dept: GENERAL RADIOLOGY | Facility: CLINIC | Age: 36
End: 2021-01-24
Attending: FAMILY MEDICINE
Payer: COMMERCIAL

## 2021-01-24 VITALS
WEIGHT: 260 LBS | OXYGEN SATURATION: 100 % | BODY MASS INDEX: 37.84 KG/M2 | TEMPERATURE: 98.7 F | HEART RATE: 71 BPM | RESPIRATION RATE: 18 BRPM | SYSTOLIC BLOOD PRESSURE: 152 MMHG | DIASTOLIC BLOOD PRESSURE: 101 MMHG

## 2021-01-24 DIAGNOSIS — M94.0 COSTOCHONDRITIS: ICD-10-CM

## 2021-01-24 LAB
ALBUMIN SERPL-MCNC: 3.8 G/DL (ref 3.4–5)
ALP SERPL-CCNC: 57 U/L (ref 40–150)
ALT SERPL W P-5'-P-CCNC: 13 U/L (ref 0–50)
ANION GAP SERPL CALCULATED.3IONS-SCNC: 5 MMOL/L (ref 3–14)
AST SERPL W P-5'-P-CCNC: 7 U/L (ref 0–45)
BASOPHILS # BLD AUTO: 0 10E9/L (ref 0–0.2)
BASOPHILS NFR BLD AUTO: 0.3 %
BILIRUB SERPL-MCNC: 0.2 MG/DL (ref 0.2–1.3)
BUN SERPL-MCNC: 17 MG/DL (ref 7–30)
CALCIUM SERPL-MCNC: 8.9 MG/DL (ref 8.5–10.1)
CHLORIDE SERPL-SCNC: 109 MMOL/L (ref 94–109)
CO2 SERPL-SCNC: 27 MMOL/L (ref 20–32)
CREAT SERPL-MCNC: 0.76 MG/DL (ref 0.52–1.04)
DIFFERENTIAL METHOD BLD: ABNORMAL
EOSINOPHIL NFR BLD AUTO: 1.3 %
ERYTHROCYTE [DISTWIDTH] IN BLOOD BY AUTOMATED COUNT: 12.8 % (ref 10–15)
FLUAV RNA RESP QL NAA+PROBE: NEGATIVE
FLUBV RNA RESP QL NAA+PROBE: NEGATIVE
GFR SERPL CREATININE-BSD FRML MDRD: >90 ML/MIN/{1.73_M2}
GLUCOSE SERPL-MCNC: 101 MG/DL (ref 70–99)
HCT VFR BLD AUTO: 38.7 % (ref 35–47)
HGB BLD-MCNC: 13.1 G/DL (ref 11.7–15.7)
IMM GRANULOCYTES # BLD: 0 10E9/L (ref 0–0.4)
IMM GRANULOCYTES NFR BLD: 0.2 %
LABORATORY COMMENT REPORT: NORMAL
LYMPHOCYTES # BLD AUTO: 3.9 10E9/L (ref 0.8–5.3)
LYMPHOCYTES NFR BLD AUTO: 32.3 %
MCH RBC QN AUTO: 28.9 PG (ref 26.5–33)
MCHC RBC AUTO-ENTMCNC: 33.9 G/DL (ref 31.5–36.5)
MCV RBC AUTO: 85 FL (ref 78–100)
MONOCYTES # BLD AUTO: 0.8 10E9/L (ref 0–1.3)
MONOCYTES NFR BLD AUTO: 6.9 %
NEUTROPHILS # BLD AUTO: 7.2 10E9/L (ref 1.6–8.3)
NEUTROPHILS NFR BLD AUTO: 59 %
NRBC # BLD AUTO: 0 10*3/UL
NRBC BLD AUTO-RTO: 0 /100
PLATELET # BLD AUTO: 224 10E9/L (ref 150–450)
POTASSIUM SERPL-SCNC: 3.4 MMOL/L (ref 3.4–5.3)
PROT SERPL-MCNC: 7 G/DL (ref 6.8–8.8)
RBC # BLD AUTO: 4.53 10E12/L (ref 3.8–5.2)
RSV RNA SPEC QL NAA+PROBE: NORMAL
SARS-COV-2 RNA RESP QL NAA+PROBE: NEGATIVE
SODIUM SERPL-SCNC: 141 MMOL/L (ref 133–144)
SPECIMEN SOURCE: NORMAL
TROPONIN I SERPL-MCNC: <0.015 UG/L (ref 0–0.04)
WBC # BLD AUTO: 12.2 10E9/L (ref 4–11)

## 2021-01-24 PROCEDURE — 93010 ELECTROCARDIOGRAM REPORT: CPT | Performed by: FAMILY MEDICINE

## 2021-01-24 PROCEDURE — 99285 EMERGENCY DEPT VISIT HI MDM: CPT | Mod: 25 | Performed by: FAMILY MEDICINE

## 2021-01-24 PROCEDURE — 80053 COMPREHEN METABOLIC PANEL: CPT | Performed by: FAMILY MEDICINE

## 2021-01-24 PROCEDURE — 250N000011 HC RX IP 250 OP 636: Performed by: FAMILY MEDICINE

## 2021-01-24 PROCEDURE — 85025 COMPLETE CBC W/AUTO DIFF WBC: CPT | Performed by: FAMILY MEDICINE

## 2021-01-24 PROCEDURE — 96372 THER/PROPH/DIAG INJ SC/IM: CPT | Performed by: FAMILY MEDICINE

## 2021-01-24 PROCEDURE — 71045 X-RAY EXAM CHEST 1 VIEW: CPT

## 2021-01-24 PROCEDURE — C9803 HOPD COVID-19 SPEC COLLECT: HCPCS | Performed by: FAMILY MEDICINE

## 2021-01-24 PROCEDURE — 84484 ASSAY OF TROPONIN QUANT: CPT | Performed by: FAMILY MEDICINE

## 2021-01-24 PROCEDURE — 87636 SARSCOV2 & INF A&B AMP PRB: CPT | Performed by: FAMILY MEDICINE

## 2021-01-24 PROCEDURE — 93005 ELECTROCARDIOGRAM TRACING: CPT | Performed by: FAMILY MEDICINE

## 2021-01-24 RX ORDER — KETOROLAC TROMETHAMINE 30 MG/ML
60 INJECTION, SOLUTION INTRAMUSCULAR; INTRAVENOUS ONCE
Status: COMPLETED | OUTPATIENT
Start: 2021-01-24 | End: 2021-01-24

## 2021-01-24 RX ORDER — KETOROLAC TROMETHAMINE 30 MG/ML
30 INJECTION, SOLUTION INTRAMUSCULAR; INTRAVENOUS ONCE
Status: DISCONTINUED | OUTPATIENT
Start: 2021-01-24 | End: 2021-01-24

## 2021-01-24 RX ADMIN — KETOROLAC TROMETHAMINE 60 MG: 30 INJECTION, SOLUTION INTRAMUSCULAR at 21:39

## 2021-01-24 NOTE — Clinical Note
Leah Holloway was seen and treated in our emergency department on 1/24/2021.  She may return to work on 01/26/2021.       If you have any questions or concerns, please don't hesitate to call.      Tye Sarabia MD

## 2021-01-25 NOTE — ED PROVIDER NOTES
History     Chief Complaint   Patient presents with     Chest Pain     HPI  Leah Holloway is a 35 year old female who presents to the ED this evening with chest pain and shortness of breath.  It started gradually yesterday and worsened today.  Pain is in the center of her chest and does not radiate.  Feels somewhat heavy and squeezing at times.  Sometimes it feels like her heart is beating fast or hard.  She is worried she might have some anxiety as well.  She is slightly short of breath when the pain intensifies.  Also feels tired.  No fevers chills sweats nausea vomiting or diaphoresis.  No significant cough.  She does have asthma but only uses an inhaler as needed.  Did not even get an inhaler until earlier this year when she had to start wearing a mask because of Covid.  Last used 2 days ago.  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    Cardiac Risk factors: Non-smoker, some family history of heart disease in paternal grandmother.  Dad has had a couple of strokes or TIAs.  Unsure of her cholesterol.  No diabetes.  No current hypertension.  Blood pressure had gone up several years ago when she was diagnosed with renal artery stenosis.  They did angioplasty and she has been fine since.    Allergies:  Allergies   Allergen Reactions     Aluminum Hives     Reaction when pt comes into contact with aluminum.     Benadryl [Anti-Itch]      No Clinical Screening - See Comments Rash     Adhesives       Problem List:    Patient Active Problem List    Diagnosis Date Noted     Plica of knee, right 01/28/2016     Priority: Medium     Renal artery stenosis (H) 01/13/2016     Priority: Medium     Surgery in 2009       Chondromalacia patellae, right 09/10/2015     Priority: Medium     Synovitis of knee 08/13/2015     Priority: Medium     CARDIOVASCULAR SCREENING; LDL GOAL LESS THAN 160 10/31/2010     Priority: Medium     GERD (gastroesophageal reflux disease) 05/28/2008      Priority: Medium     Elevated blood pressure reading without diagnosis of hypertension 03/19/2008     Priority: Medium     Headache 03/19/2008     Priority: Medium     Problem list name updated by automated process. Provider to review       Syncope and collapse 03/19/2008     Priority: Medium        Past Medical History:    No past medical history on file.    Past Surgical History:    Past Surgical History:   Procedure Laterality Date     ARTHROSCOPY KNEE Right 1/22/2016    Procedure: ARTHROSCOPY KNEE;  Surgeon: Ian Jaimes MD;  Location: [facility] OR     GENITOURINARY SURGERY       HC KNEE SCOPE,PART SYNOVECT Right 1/22/16    Medial plica excision - WORK COMP       Family History:    Family History   Problem Relation Age of Onset     Coronary Artery Disease No family hx of      Colon Cancer No family hx of      Mental Illness No family hx of      Obesity No family hx of      Osteoporosis No family hx of      Depression No family hx of      Cerebrovascular Disease No family hx of        Social History:  Marital Status:  Single [1]  Social History     Tobacco Use     Smoking status: Never Smoker     Smokeless tobacco: Never Used     Tobacco comment: No smokers in home.   Substance Use Topics     Alcohol use: No     Drug use: No        Medications:         BP MONITOR-STETHOSCOPE KIT       cetirizine (ZYRTEC) 5 MG/5ML solution       FLONASE 50 MCG/ACT NA SUSP       IBUPROFEN PO       methylPREDNISolone (MEDROL DOSEPAK) 4 MG tablet therapy pack          Review of Systems   All other systems reviewed and are negative.      Physical Exam   BP: (!) 152/101  Pulse: 72  Temp: 98.7  F (37.1  C)  Resp: 18  Weight: 117.9 kg (260 lb)  SpO2: 97 %      Physical Exam  Constitutional:       General: She is not in acute distress.     Appearance: She is well-developed.   HENT:      Mouth/Throat:      Mouth: Mucous membranes are moist.   Eyes:      Extraocular Movements: Extraocular movements intact.   Cardiovascular:      Rate and  Rhythm: Normal rate and regular rhythm.   Pulmonary:      Effort: Pulmonary effort is normal.      Breath sounds: Normal breath sounds.   Chest:      Chest wall: Tenderness (resproducible along bilateral sternal borders) present.   Abdominal:      Palpations: Abdomen is soft.      Tenderness: There is no abdominal tenderness.   Musculoskeletal: Normal range of motion.         General: No swelling or tenderness.   Skin:     General: Skin is warm.   Neurological:      General: No focal deficit present.      Mental Status: She is alert and oriented to person, place, and time.   Psychiatric:         Mood and Affect: Mood normal.         ED Course  (with Medical Decision Making)    35-year-old female with chest pain and mild shortness of breath started gradually yesterday worsened today.  No associated fevers chills or sweats.  No cough.  She does have reproducible pain with palpation along the sternal borders.  EKG showed no acute ischemic changes.  Troponin was undetectable.  Chest x-ray unremarkable.  White count slightly at 12.2.  She asked if the Toradol could be given IM rather than IV.    Toradol did help decrease her pain.  Covid and influenza were negative.  The rest of her labs were unremarkable.  Appears to have chest wall pain/costochondritis.  We will treat symptomatically and reevaluate if her symptoms change or worsen.  Written discharge instructions given.  She is supposed to be up at 2:45 AM to go to work.  I think it best if she takes a day off or at least goes in later so she can get adequate rest.  Burning the candle at both ends is only going to prolong her symptoms.  Work note written.  She is in agreement.          Procedures               EKG Interpretation:      Interpreted by Tye Sarabia MD  Time reviewed: 2038  Symptoms at time of EKG: chest pain   Rhythm: normal sinus   Rate: normal  Axis: normal  Ectopy: none  Conduction: normal  ST Segments/ T Waves: No ST-T wave changes  Q  Waves: none  Comparison to prior: Unchanged from 6/11/2018    Clinical Impression: No acute ischemic changes.          Critical Care time:  none               Results for orders placed or performed during the hospital encounter of 01/24/21 (from the past 24 hour(s))   CBC with platelets differential   Result Value Ref Range    WBC 12.2 (H) 4.0 - 11.0 10e9/L    RBC Count 4.53 3.8 - 5.2 10e12/L    Hemoglobin 13.1 11.7 - 15.7 g/dL    Hematocrit 38.7 35.0 - 47.0 %    MCV 85 78 - 100 fl    MCH 28.9 26.5 - 33.0 pg    MCHC 33.9 31.5 - 36.5 g/dL    RDW 12.8 10.0 - 15.0 %    Platelet Count 224 150 - 450 10e9/L    Diff Method Automated Method     % Neutrophils 59.0 %    % Lymphocytes 32.3 %    % Monocytes 6.9 %    % Eosinophils 1.3 %    % Basophils 0.3 %    % Immature Granulocytes 0.2 %    Nucleated RBCs 0 0 /100    Absolute Neutrophil 7.2 1.6 - 8.3 10e9/L    Absolute Lymphocytes 3.9 0.8 - 5.3 10e9/L    Absolute Monocytes 0.8 0.0 - 1.3 10e9/L    Absolute Basophils 0.0 0.0 - 0.2 10e9/L    Abs Immature Granulocytes 0.0 0 - 0.4 10e9/L    Absolute Nucleated RBC 0.0    Comprehensive metabolic panel   Result Value Ref Range    Sodium 141 133 - 144 mmol/L    Potassium 3.4 3.4 - 5.3 mmol/L    Chloride 109 94 - 109 mmol/L    Carbon Dioxide 27 20 - 32 mmol/L    Anion Gap 5 3 - 14 mmol/L    Glucose 101 (H) 70 - 99 mg/dL    Urea Nitrogen 17 7 - 30 mg/dL    Creatinine 0.76 0.52 - 1.04 mg/dL    GFR Estimate >90 >60 mL/min/[1.73_m2]    GFR Estimate If Black >90 >60 mL/min/[1.73_m2]    Calcium 8.9 8.5 - 10.1 mg/dL    Bilirubin Total 0.2 0.2 - 1.3 mg/dL    Albumin 3.8 3.4 - 5.0 g/dL    Protein Total 7.0 6.8 - 8.8 g/dL    Alkaline Phosphatase 57 40 - 150 U/L    ALT 13 0 - 50 U/L    AST 7 0 - 45 U/L   Troponin I   Result Value Ref Range    Troponin I ES <0.015 0.000 - 0.045 ug/L   Symptomatic Influenza A/B & SARS-CoV2 (COVID-19) Virus PCR Multiplex    Specimen: Nasopharyngeal   Result Value Ref Range    Flu A/B & SARS-COV-2 PCR Source  Nasopharyngeal     SARS-CoV-2 PCR Result NEGATIVE     Influenza A PCR Negative NEG^Negative    Influenza B PCR Negative NEG^Negative    Respiratory Syncytial Virus PCR (Note)     Flu A/B & SARS-CoV-2 PCR Comment (Note)    XR Chest Port 1 View    Narrative    CHEST PORTABLE ONE VIEW   1/24/2021 9:25 PM     HISTORY: Chest pain, SOA.    COMPARISON: Chest x-rays dated 8/7/2020.    FINDINGS:  Cardiac silhouette is upper normal size which is likely due  to technique. Lungs are clear. Mediastinum and pulmonary vascularity  are within normal limits. No pneumothorax or significant pleural fluid  collection. No acute osseous fracture.      Impression    IMPRESSION:   1. Cardiac silhouette is top normal size which is likely due  magnification from the AP technique.  2.  No other evidence of acute cardiopulmonary disease is seen.    THANH MENCHACA MD       Medications   ketorolac (TORADOL) injection 60 mg (60 mg Intramuscular Given 1/24/21 2139)       Assessments & Plan     I have reviewed the nursing notes.    I have reviewed the findings, diagnosis, plan and need for follow up with the patient.          New Prescriptions    No medications on file       Final diagnoses:   Costochondritis       1/24/2021   [facility] EMERGENCY DEPT     Tye Sarabia MD  01/24/21 8198

## 2021-01-25 NOTE — DISCHARGE INSTRUCTIONS
Try to get adequate rest.  Ibuprofen 600 mg and Tylenol 1000 mg every 6 hours as needed for pain.  You can take them at the same time.  Take with food so the Ibuprofen doesn't upset your stomach.  Your chest x-ray, EKG and lab work were all reassuring.  Recheck in clinic if persistent problems.  Return to the ED if you worsen or have any concerns.  It was nice visiting with you tonight.  I am glad you are feeling a least little bit better and hope you continue to improve.

## 2021-03-01 ENCOUNTER — HOSPITAL ENCOUNTER (EMERGENCY)
Facility: CLINIC | Age: 36
Discharge: HOME | End: 2021-03-01
Attending: EMERGENCY MEDICINE | Admitting: EMERGENCY MEDICINE
Payer: COMMERCIAL

## 2021-03-01 VITALS
OXYGEN SATURATION: 100 % | HEART RATE: 73 BPM | SYSTOLIC BLOOD PRESSURE: 184 MMHG | DIASTOLIC BLOOD PRESSURE: 111 MMHG | RESPIRATION RATE: 18 BRPM | TEMPERATURE: 98.3 F

## 2021-03-01 DIAGNOSIS — J20.9 ACUTE BRONCHITIS, UNSPECIFIED ORGANISM: ICD-10-CM

## 2021-03-01 PROCEDURE — 94060 EVALUATION OF WHEEZING: CPT

## 2021-03-01 PROCEDURE — 999N000157 HC STATISTIC RCP TIME EA 10 MIN

## 2021-03-01 PROCEDURE — 999N000105 HC STATISTIC NO DOCUMENTATION TO SUPPORT CHARGE

## 2021-03-01 PROCEDURE — 250N000009 HC RX 250: Performed by: EMERGENCY MEDICINE

## 2021-03-01 PROCEDURE — 999N000156 HC STATISTIC RCP CONSULT EA 30 MIN

## 2021-03-01 PROCEDURE — 999N000123 HC STATISTIC OXYGEN O2DAILY TECH TIME

## 2021-03-01 PROCEDURE — 999N000159 HC STATISTIC RCP TIME PACU VENT EA 10 MIN

## 2021-03-01 PROCEDURE — 99284 EMERGENCY DEPT VISIT MOD MDM: CPT | Performed by: EMERGENCY MEDICINE

## 2021-03-01 PROCEDURE — 94640 AIRWAY INHALATION TREATMENT: CPT

## 2021-03-01 PROCEDURE — 99283 EMERGENCY DEPT VISIT LOW MDM: CPT | Mod: 25 | Performed by: EMERGENCY MEDICINE

## 2021-03-01 RX ORDER — IPRATROPIUM BROMIDE AND ALBUTEROL SULFATE 2.5; .5 MG/3ML; MG/3ML
3 SOLUTION RESPIRATORY (INHALATION) ONCE
Status: COMPLETED | OUTPATIENT
Start: 2021-03-01 | End: 2021-03-01

## 2021-03-01 RX ORDER — PREDNISONE 20 MG/1
TABLET ORAL
Qty: 7 TABLET | Refills: 0 | Status: SHIPPED | OUTPATIENT
Start: 2021-03-01 | End: 2021-03-07

## 2021-03-01 RX ADMIN — IPRATROPIUM BROMIDE AND ALBUTEROL SULFATE 3 ML: .5; 3 SOLUTION RESPIRATORY (INHALATION) at 10:57

## 2021-03-01 NOTE — DISCHARGE INSTRUCTIONS
As discussed this may be a bronchitis which is viral, asthma or just allergies.  If you get a fever, body aches, headache etc you should be tested for covid.  I sent a prescription for prednisone to the pharmacy and hopefully that will help.  You can continue to use your inhaler every 4 hours as needed.

## 2021-03-01 NOTE — ED TRIAGE NOTES
"Pt stated \"Last week I had really bad allergies. Today I started to drain and I have asthma, I think that is what triggered my asthma attack. I took my inhaler twice yesterday and today I took it 5 times. Nothing has really been helping.\" >   "

## 2021-03-01 NOTE — PROGRESS NOTES
Spirometry performed pre and post Duo neb with good effort  Breath sounds diminished pre and post neb.  Patient states less chest tightness post neb.  Room Air SpO2 = 95%.   RCP will follow prn.

## 2021-03-01 NOTE — ED PROVIDER NOTES
History     Chief Complaint   Patient presents with     Shortness of Breath     HPI  Leah Hloloway is a 35 year old female who presents to the ER secondary to concerns regarding a possible asthma attack.  She does not have a formal diagnosis of asthma.  She has an albuterol inhaler from previous symptoms that were similar.  She has had allergies for the last week which include nasal congestion and dripping in the back of her throat with some coughing but no wheezing, fever, headache, body aches, fatigue, nausea, vomiting, diarrhea.  She is used her albuterol inhaler a number of times without significant improvement.  She has never had formal breathing test for asthma.  She has used steroids in the past.  Right now she has no wheezing and minimal shortness of breath and has had some voice changes that she says are secondary to the allergies she has been experiencing.  She has been taking Flonase and Claritin for the last week.  Today is when the cough started and she became concerned that this may be an asthma attack.    Allergies:  Allergies   Allergen Reactions     Aluminum Hives     Reaction when pt comes into contact with aluminum.     Benadryl [Anti-Itch]      No Clinical Screening - See Comments Rash     Adhesives       Problem List:    Patient Active Problem List    Diagnosis Date Noted     Plica of knee, right 01/28/2016     Priority: Medium     Renal artery stenosis (H) 01/13/2016     Priority: Medium     Surgery in 2009       Chondromalacia patellae, right 09/10/2015     Priority: Medium     Synovitis of knee 08/13/2015     Priority: Medium     CARDIOVASCULAR SCREENING; LDL GOAL LESS THAN 160 10/31/2010     Priority: Medium     GERD (gastroesophageal reflux disease) 05/28/2008     Priority: Medium     Elevated blood pressure reading without diagnosis of hypertension 03/19/2008     Priority: Medium     Headache 03/19/2008     Priority: Medium     Problem list name updated by automated process. Provider  to review       Syncope and collapse 03/19/2008     Priority: Medium        Past Medical History:    History reviewed. No pertinent past medical history.    Past Surgical History:    Past Surgical History:   Procedure Laterality Date     ARTHROSCOPY KNEE Right 1/22/2016    Procedure: ARTHROSCOPY KNEE;  Surgeon: Ian Jaimes MD;  Location: [facility] OR     GENITOURINARY SURGERY       HC KNEE SCOPE,PART SYNOVECT Right 1/22/16    Medial plica excision - WORK COMP       Family History:    Family History   Problem Relation Age of Onset     Coronary Artery Disease No family hx of      Colon Cancer No family hx of      Mental Illness No family hx of      Obesity No family hx of      Osteoporosis No family hx of      Depression No family hx of      Cerebrovascular Disease No family hx of        Social History:  Marital Status:  Single [1]  Social History     Tobacco Use     Smoking status: Never Smoker     Smokeless tobacco: Never Used     Tobacco comment: No smokers in home.   Substance Use Topics     Alcohol use: No     Drug use: No        Medications:    predniSONE (DELTASONE) 20 MG tablet  BP MONITOR-STETHOSCOPE KIT  cetirizine (ZYRTEC) 5 MG/5ML solution  FLONASE 50 MCG/ACT NA SUSP  IBUPROFEN PO  methylPREDNISolone (MEDROL DOSEPAK) 4 MG tablet therapy pack          Review of Systems   All other systems reviewed and are negative.      Physical Exam   BP: (!) 191/119  Pulse: 85  Temp: 98.3  F (36.8  C)  Resp: 24  SpO2: 100 %      Physical Exam  Vitals signs and nursing note reviewed.   Constitutional:       General: She is not in acute distress.     Appearance: She is well-developed. She is not diaphoretic.   HENT:      Head: Normocephalic and atraumatic.   Eyes:      General: No scleral icterus.  Neck:      Musculoskeletal: Normal range of motion and neck supple.   Cardiovascular:      Rate and Rhythm: Normal rate and regular rhythm.   Pulmonary:      Effort: Pulmonary effort is normal.      Breath sounds: Normal  breath sounds. No decreased breath sounds, wheezing, rhonchi or rales.   Musculoskeletal: Normal range of motion.      Right lower leg: No edema.      Left lower leg: No edema.   Skin:     General: Skin is warm and dry.      Coloration: Skin is not pale.      Findings: No erythema or rash.   Neurological:      General: No focal deficit present.      Mental Status: She is alert and oriented to person, place, and time.   Psychiatric:         Mood and Affect: Mood normal.         Behavior: Behavior normal.         ED Course        Procedures             No results found for this or any previous visit (from the past 24 hour(s)).    Medications   ipratropium - albuterol 0.5 mg/2.5 mg/3 mL (DUONEB) neb solution 3 mL (3 mLs Nebulization Given 3/1/21 1057)       Assessments & Plan (with Medical Decision Making)  Allergies and a cough in a patient who does not have a formal diagnosis of asthma.  Pre and post neb peak flows performed and both were within normal range with little change although the patient subjectively felt quite improved.  No wheezing on exam.  Will treat with a short course of steroids along with continuing her albuterol inhaler and trying Zyrtec.  I advised her to follow-up with her primary care provider for formal testing for asthma.  The patient was in agreement with that plan.  Return to ER precautions were discussed.     I have reviewed the nursing notes.    I have reviewed the findings, diagnosis, plan and need for follow up with the patient.      Discharge Medication List as of 3/1/2021 11:10 AM      START taking these medications    Details   predniSONE (DELTASONE) 20 MG tablet Take 2 tablets (40 mg) by mouth daily for 2 days, THEN 1 tablet (20 mg) daily for 2 days, THEN 0.5 tablets (10 mg) daily for 2 days., Disp-7 tablet, R-0, E-Prescribe             Final diagnoses:   Acute bronchitis, unspecified organism       3/1/2021   [facility] EMERGENCY DEPT     Filemon Estrada,  MD  03/01/21 0159

## 2021-10-10 NOTE — ED TRIAGE NOTES
Presents to ED with concerns of shortness of breath due to worsening seasonal allergies. Has been taking zyrtec with minimal improvement. Patient speaks in a whisper as she has lost her voice. Denies any fevers, chills, or chest pain. States she works overnights and when she went outside earlier this morning her symptoms worsened. She also has a history of asthma. Has had similar episodes in the past. Patient's airway, breathing, circulation, and disability/mental status (ABCDs) intact/WDL during triage.     No

## 2022-07-06 ENCOUNTER — OFFICE VISIT (OUTPATIENT)
Dept: FAMILY MEDICINE | Facility: CLINIC | Age: 37
End: 2022-07-06
Payer: COMMERCIAL

## 2022-07-06 VITALS
DIASTOLIC BLOOD PRESSURE: 126 MMHG | BODY MASS INDEX: 38.11 KG/M2 | RESPIRATION RATE: 14 BRPM | WEIGHT: 261.8 LBS | SYSTOLIC BLOOD PRESSURE: 184 MMHG | OXYGEN SATURATION: 98 % | TEMPERATURE: 97.8 F | HEART RATE: 89 BPM

## 2022-07-06 DIAGNOSIS — I10 SEVERE HYPERTENSION: Primary | ICD-10-CM

## 2022-07-06 DIAGNOSIS — E66.01 MORBID OBESITY (H): ICD-10-CM

## 2022-07-06 DIAGNOSIS — I70.1 RENAL ARTERY STENOSIS (H): ICD-10-CM

## 2022-07-06 PROCEDURE — 99204 OFFICE O/P NEW MOD 45 MIN: CPT | Performed by: FAMILY MEDICINE

## 2022-07-06 RX ORDER — LISINOPRIL 10 MG/1
TABLET ORAL
COMMUNITY
Start: 2022-07-04 | End: 2022-07-15 | Stop reason: SINTOL

## 2022-07-06 RX ORDER — AMLODIPINE BESYLATE 5 MG/1
TABLET ORAL
COMMUNITY
Start: 2022-07-06 | End: 2022-08-08

## 2022-07-06 ASSESSMENT — PAIN SCALES - GENERAL: PAINLEVEL: SEVERE PAIN (7)

## 2022-07-06 NOTE — PROGRESS NOTES
Assessment & Plan     Severe hypertension  My first visit with patient.  Previous history reviewed with her and in her chart.  Has a history of renal artery stenosis that required a surgical procedure back in 2009.  To her knowledge things have been under reasonable control following that but I see a variety of blood pressure readings that were elevated throughout the years including a couple of fairly high readings in the past couple of years.  These were always seen with some other type of issue going on that may have explained.  Patient feels that she had started feeling a bit more fatigued with effort in the last couple of months but had not been following blood pressure routinely.  Numbers now are extremely high.  There does not appear to be any end organ involvement however.  Was seen on the fourth and the fifth through the emergency department and prescribed lisinopril 10 mg and amlodipine 5 mg respectively.  Has just started those.  But I think there is a high likelihood that this is secondary renal hypertension rather than severe primary hypertension.  So I want to set up a CT renal angiogram looking for renal artery stenosis with follow-up accordingly.  Would like to get her in with nephrology though that may take some time.  Advised increasing the lisinopril to 20 mg daily and continuing amlodipine 5 mg daily.  We will be in contact in the next few days about home results, etc.  - Adult Nephrology  Referral; Future  - CTA Renal w Contrast; Future    Renal artery stenosis (H)  Possibility that this has returned and needs further evaluation.    Morbid obesity (H)  Brief discussion of diet and exercise but we really need to get things under control before pushing this terribly hard.           See Patient Instructions    Return in about 2 weeks (around 7/20/2022) for Based upon test results.    Micaela Koo MD  [facility]    Thomas Mallory is a 36 year old,  presenting for the following health issues:  Hypertension and Hospital F/U      HPI     Hypertension Follow-up      Do you check your blood pressure regularly outside of the clinic? Yes     Are you following a low salt diet? Yes    Are your blood pressures ever more than 140 on the top number (systolic) OR more   than 90 on the bottom number (diastolic), for example 140/90? Yes.    Pt went to the ER yesterday and the day before, had a BP of 236/133, Pt states she went to [facility] in [place].   Pt did not take lisinopril or amlodipine (taken last night around midnight) today.          Hospital Follow-up Visit:    Hospital/Nursing Home/IP Rehab Facility: [facility] [place]   Date of Admission: 7/4/22 and 7/5/22  Date of Discharge: 7/5/22  Reason(s) for Admission: Hypertension     Was your hospitalization related to COVID-19? No   Problems taking medications regularly:  None  Medication changes since discharge: None  Problems adhering to non-medication therapy:  None    Summary of hospitalization:  CareEverywhere information obtained and reviewed  Diagnostic Tests/Treatments reviewed.  Follow up needed: none  Other Healthcare Providers Involved in Patient s Care:         None  Update since discharge: stable.       Post Discharge Medication Reconciliation: discharge medications reconciled and changed, per note/orders.  Plan of care communicated with patient           Here today in follow-up of recent ER visits for severe hypertension.  Feels a bit fatigued but no significant headache, blurred vision, chest pain, etc.    Review of Systems   Constitutional, HEENT, cardiovascular, pulmonary, gi and gu systems are negative, except as otherwise noted.      Objective    BP (!) 184/126   Pulse 89   Temp 97.8  F (36.6  C) (Tympanic)   Resp 14   Wt 118.8 kg (261 lb 12.8 oz)   SpO2 98%   BMI 38.11 kg/m    Body mass index is 38.11 kg/m .  Physical Exam   Alert, pleasant, upbeat, and in no apparent discomfort.  S1  and S2 normal, no murmurs, clicks, gallops or rubs. Regular rate and rhythm. Chest is clear; no wheezes or rales. No edema or JVD.  Past labs reviewed with the patient.   Reviewed imaging.                    .  ..

## 2022-07-06 NOTE — PATIENT INSTRUCTIONS
Plan:    1) I am going to have the schedulers contact you from our kidney specialists as they are the ones that typically work on renal artery stenosis.  But I know that that may take a few weeks to be seen.    2) in the meantime you and I are going to look at a CT angiogram to see if stenosis has redeveloped.  They may call you to schedule, otherwise the contact number is [phone]    3) for most folks, lisinopril is the main work horse to keep blood pressure controlled.  I would still keep taking the 5 mg of amlodipine anyway.  But feel free to increase lisinopril to 20 mg  Blood pressure at home.  You and I can be in good contact about further increases over the next week or so.

## 2022-07-08 ENCOUNTER — TELEPHONE (OUTPATIENT)
Dept: FAMILY MEDICINE | Facility: CLINIC | Age: 37
End: 2022-07-08

## 2022-07-08 NOTE — TELEPHONE ENCOUNTER
Patient calling, was advised ok to return to normal activiet  Patient has been feeling lightheaded and dizzy  194/121 today at pharmacy    Patient took the Amlodipine 5mg at 11pm last night and 20mg of Lisinopril at 8am this morning  Feels tired and short of breath upon exertion  No chest pain  C/o headache past 4 days      To provider to advise  Patient was advise if does not get a response back by 5pm, to go to urgent care       uSsana BLUMN, RN

## 2022-07-09 ENCOUNTER — MYC MEDICAL ADVICE (OUTPATIENT)
Dept: FAMILY MEDICINE | Facility: CLINIC | Age: 37
End: 2022-07-09

## 2022-07-11 ENCOUNTER — ANCILLARY PROCEDURE (OUTPATIENT)
Dept: CT IMAGING | Facility: CLINIC | Age: 37
End: 2022-07-11
Attending: FAMILY MEDICINE
Payer: COMMERCIAL

## 2022-07-11 DIAGNOSIS — I10 SEVERE HYPERTENSION: ICD-10-CM

## 2022-07-11 PROCEDURE — 74175 CTA ABDOMEN W/CONTRAST: CPT | Performed by: RADIOLOGY

## 2022-07-11 RX ORDER — IOPAMIDOL 755 MG/ML
100 INJECTION, SOLUTION INTRAVASCULAR ONCE
Status: COMPLETED | OUTPATIENT
Start: 2022-07-11 | End: 2022-07-11

## 2022-07-11 RX ADMIN — IOPAMIDOL 100 ML: 755 INJECTION, SOLUTION INTRAVASCULAR at 13:34

## 2022-07-11 NOTE — LETTER
July 12, 2022      Shawnee Holloway  [address] [facility] [address]        Dear MsAnnette,    We are writing to inform you of your test results.        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    CATRINA BOLTON MD         SYSTEM ID:  [id number]      Narrative    CTA RENAL WITH CONTRAST 7/11/2022 1:51 PM    CLINICAL HISTORY: prev h/o renal artery stenosis - had some type of  procedure in 2009; Severe hypertension. Per Care Everywhere right mid  renal artery 5 mm angioplasty for stenosis related to fibromuscular  dysplasia in the [facility] on 7/17/2008.    COMPARISONS: Report from angiogram and angioplasty 7/17/2008. Images  were unavailable to review. CT 6/11/2018 and 6/24/2020.    REFERRING PROVIDER: MARION CELESTE    TECHNIQUE: Unenhanced CT performed through the abdomen and pelvis.  Following the uneventful administration of intravenous contrast, CTA  was performed through the abdomen and pelvis. After a 70 second delay,  CT was repeated through the abdomen. Coronal and sagittal  reconstructions were produced.    3D and multiplanar reconstructions were unavailable at the time of  dictation.    CONTRAST: 100 mL Isovue 370    DOSE (DLP): 2682 mGy*cm    FINDINGS: CTA: Two saccular mid superior right renal artery aneurysms,  best seen in the coronal reconstruction (series 12, image 65).  Proximal superior aneurysm measures 7.8 mm in diameter. Artery between  the aneurysms measures 4.3 mm in diameter. Inferior more distal  aneurysm measures 8.6 mm in diameter. In 2018, the proximal aneurysm  measured 7.8 mm and the distal aneurysm measured 8.4 mm.     Two right and single left renal arteries. Superior right renal artery  is the main renal artery. Smaller inferior right renal artery  originates anteriorly from the aorta 16 mm inferior to the main  superior right renal artery and supplies a portion of the  ventral  lower pole.    No renal artery stenosis or dissection demonstrated. No renal artery  arterial beading suggested.    Patent aorta without aneurysm, dissection, or stenosis. Celiac,  superior mesenteric, and inferior mesenteric arteries are patent  without stenosis, beading, or aneurysm. Bilateral common, internal,  and external iliac arteries patent. Bilateral corona mortis. Bilateral  common femoral arteries patent.    CT: Lung bases clear.    Small fat containing umbilical hernia.    Ovarian follicles and cysts.    Sclerotic foci in bones are thought to be bone islands.      Impression    IMPRESSION:  1. No renal artery stenosis demonstrated.    2. Mid superior right renal artery aneurysms not significantly changed  from 2018. Proximal superior aneurysm measures 7.8 mm in diameter.  Inferior distal aneurysm measures 8.6 mm in diameter. Renal artery  aneurysms are usually treated if they are symptomatic or exceed 20 mm  in diameter.    CATRINA BOLTON MD         SYSTEM ID:  [id number]       If you have any questions or concerns, please call the clinic at the number listed above.       Sincerely,      Micaela Koo MD

## 2022-07-11 NOTE — TELEPHONE ENCOUNTER
Called patient, discussed the message from Dr. Koo.   She did not go to the  over the weekend, she is at work currently in [place].    She still is having the same symptoms ongoing from Friday. When she is stationary, she is fine.     She has Dizziness/lightheadedness with moving around, activity that is more intense, she is more fatigued and SOB easily, she has an ongoing headache, but not a migraine or worst headache of her life.   Patient's blood pressure this morning was 189/111.      Patient will go to the [facility] in the hospital, she will try to find a ride there.     Patient has upcoming appointment for renal CT this afternoon.    Jane Garcia RN, [facility]

## 2022-07-11 NOTE — TELEPHONE ENCOUNTER
Pt was called today and was advised to go to  to be seen. Pt agreed.    Refer to telephone encounter from 7/8/22.      Brittney Moe RN  [facility]

## 2022-07-11 NOTE — RESULT ENCOUNTER NOTE
Call patient (and send a copy of labs):  Her CT did not show any new stenosis.  So this is very good news from the standpoint of not moving another procedure at this time.  But we clearly have to get this blood pressure down.  We should hopefully see some results with a doubled dose of amlodipine to 10 mg and make sure she is taking 20 mg of the lisinopril.  Let me know over the course of this next week or 2 how blood pressure is running and how she is doing and we can add additional agents if needed.  But if very symptomatic such as headache, dizziness, etc. and may be something that they need to provide some IV treatment at the ER.    MEGAN Koo M.D.

## 2022-07-11 NOTE — TELEPHONE ENCOUNTER
Could try taking 10 mg of the amlodipine.  But there is not a lot that I can magically fix for her virtually.  And we certainly do not have IV medications at the clinic so she may need to go to the ER if things are bad -that would be the fastest way to be evaluated and treated.

## 2022-07-14 ENCOUNTER — MYC MEDICAL ADVICE (OUTPATIENT)
Dept: FAMILY MEDICINE | Facility: CLINIC | Age: 37
End: 2022-07-14

## 2022-07-14 DIAGNOSIS — I10 SEVERE HYPERTENSION: Primary | ICD-10-CM

## 2022-07-15 RX ORDER — LOSARTAN POTASSIUM 25 MG/1
25 TABLET ORAL DAILY
Qty: 30 TABLET | Refills: 0 | Status: SHIPPED | OUTPATIENT
Start: 2022-07-15 | End: 2022-08-08

## 2022-07-15 NOTE — TELEPHONE ENCOUNTER
Continue amlodipine 5 mg daily  Discontinue lisinopril due to fatigue   Add losartan 25 mg daily  To emergency department if headache, chest pain or other change in symptoms   Keep upcoming appointment on Monday as scheduled

## 2022-07-15 NOTE — TELEPHONE ENCOUNTER
Message sent to the patient with Dorothy Loyola's note.    Jane Garcia RN, [facility]

## 2022-07-15 NOTE — TELEPHONE ENCOUNTER
Routing [employer] message to covering providers  to please review when able.     See patient went to ED for severe hypertension on 7/5/22, Office visit with Dr. Koo on 7/6/22.      Advised increasing the lisinopril to 20 mg daily and continuing  amlodipine 5 mg daily    BP Readings from Last 1 Encounters:   07/06/22 (!) 184/126         Lisinopril is causing patient to be extremely tired.  BP continues to be elevated: 179/90 and  209/117    Jane Garcia RN, [facility]

## 2022-07-18 ENCOUNTER — OFFICE VISIT (OUTPATIENT)
Dept: NEPHROLOGY | Facility: CLINIC | Age: 37
End: 2022-07-18
Attending: INTERNAL MEDICINE
Payer: COMMERCIAL

## 2022-07-18 VITALS
WEIGHT: 268.8 LBS | DIASTOLIC BLOOD PRESSURE: 89 MMHG | BODY MASS INDEX: 39.13 KG/M2 | SYSTOLIC BLOOD PRESSURE: 148 MMHG | HEART RATE: 79 BPM | OXYGEN SATURATION: 98 %

## 2022-07-18 DIAGNOSIS — I12.9 HYPERTENSION, RENAL: Primary | ICD-10-CM

## 2022-07-18 PROCEDURE — G0463 HOSPITAL OUTPT CLINIC VISIT: HCPCS

## 2022-07-18 PROCEDURE — 99204 OFFICE O/P NEW MOD 45 MIN: CPT | Performed by: INTERNAL MEDICINE

## 2022-07-18 RX ORDER — AMLODIPINE BESYLATE 10 MG/1
10 TABLET ORAL DAILY
Qty: 120 TABLET | Refills: 3 | Status: SHIPPED | OUTPATIENT
Start: 2022-07-18 | End: 2023-01-25

## 2022-07-18 ASSESSMENT — PAIN SCALES - GENERAL: PAINLEVEL: NO PAIN (0)

## 2022-07-18 NOTE — LETTER
7/18/2022       RE: Leah Holloway  [address]  [facility] [address]     Dear Colleague,    Thank you for referring your patient, Leah Holloway, to the [facility] [place] [facility]. Please see a copy of my visit note below.    Nephrology Clinic    Leah Holloway MRN:[id number] YOB: 1985  Date of Service: 07/18/2022  Primary care provider: Micaela Koo  Requesting physician:  Micaela Koo        REASON FOR CONSULT: Uncontrolled Hypertension    HISTORY OF PRESENT ILLNESS:   Leah Holloway is a 36 year old female who presents for evaluation of uncontrolled hypertension.  The patient has a history of hypertension since at least 2008 thought to be secondary to right renal artery stenosis for which she underwent right midrenal artery 5 mm angioplasty for stenosis related to fibromuscular  dysplasia in the [facility] on 7/17/2008. She also has a h/o morbid obesity (BMI around 38) and seasonal allergies. She was off medications for several years however she was noticed to have again an elevated BP a few months ago in the setting of headaches and presented to the ED on 7/04 and 7/05 with hypertensive crisis.  She was started then on lisinopril 10 mg daily  And amlodipine 5 mg daily but  switched  from lisinopril to losartan 25 mg at bedtime due to fatigue a few days ago. She gained 10 lbs over the past two weeks. Drinks coffee occasionally and in moderation. Also very rare alcohol intake.   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  Proximal superior aneurysm measures 7.8 mm in diameter. Artery between  the aneurysms measures 4.3 mm in diameter. Inferior more distal  aneurysm measures 8.6 mm in diameter. In 2018, the proximal aneurysm  measured 7.8 mm and the  distal aneurysm measured 8.4 mm.      Two right and single left renal arteries. Superior right renal artery  is the main renal artery. Smaller inferior right renal artery  originates anteriorly from the aorta 16 mm inferior to the main  superior right renal artery and supplies a portion of the ventral  lower pole.     No renal artery stenosis or dissection demonstrated. No renal artery  arterial beading suggested.     Patent aorta without aneurysm, dissection, or stenosis. Celiac,  superior mesenteric, and inferior mesenteric arteries are patent  without stenosis, beading, or aneurysm. Bilateral common, internal,  and external iliac arteries patent. Bilateral corona mortis. Bilateral  common femoral arteries patent.    Her maternal grandmother has hypertension and her father started having hypertension at the age of 55.    Her renal function is intact however she had low potassium levels in the past and her most recent potassium level is 3.5. Her UA shows some hematuria but no proteinuria.    PAST MEDICAL HISTORY:  No past medical history on file.  PAST SURGICAL HISTORY:  Past Surgical History:   Procedure Laterality Date     ARTHROSCOPY KNEE Right 1/22/2016    Procedure: ARTHROSCOPY KNEE;  Surgeon: Ian Jaimes MD;  Location: [facility] OR     GENITOURINARY SURGERY       HC KNEE SCOPE,PART SYNOVECT Right 1/22/16    Medial plica excision - WORK COMP     MEDICATIONS:  Prescription Medications as of 7/18/2022       Rx Number Disp Refills Start End Last Dispensed Date Next Fill Date Owning Pharmacy    amLODIPine (NORVASC) 5 MG tablet    7/6/2022        Class: Historical    BP MONITOR-STETHOSCOPE KIT  1 kit 0 5/16/2008    [facility] [address] [place] [id number] [facility]    Sig: test as directed    Class: Fax    Notes to Pharmacy: Please sub electronic BP monitor if available    Route: Does not apply    FLONASE 50 MCG/ACT NA SUSP  3 Bottle 3 8/24/2010    [facility] [address] [place] [id number] [facility]     Sig: USE 2 SPRAYS IN EACH NOSTRIL ONCE DAILY    Class: Fax    Route: Nasal    losartan (COZAAR) 25 MG tablet  30 tablet 0 7/15/2022    [place] [address]    Sig: Take 1 tablet (25 mg) by mouth daily    Class: E-Prescribe    Route: Oral    cetirizine (ZYRTEC) 5 MG/5ML solution  118 mL 0 6/7/2018        Sig: Take 10 mLs (10 mg) by mouth 2 times daily as needed for allergies (hives, or vertigo)    Class: Historical    Route: Oral    IBUPROFEN PO            Sig: Take 200 mg by mouth    Class: Historical    Route: Oral    methylPREDNISolone (MEDROL DOSEPAK) 4 MG tablet therapy pack  21 tablet 0 8/7/2020    [address]    Sig: Follow Package Directions    Class: E-Prescribe         ALLERGIES:    Allergies   Allergen Reactions     Aluminum Hives     Reaction when pt comes into contact with aluminum.     Benadryl [Anti-Itch]      Unknown [No Clinical Screening - See Comments] Rash     Adhesives     REVIEW OF SYSTEMS:  Review Of Systems  Skin: negative, negative for, pigmentation, acne, rash, scaling, itching  Eyes: negative for, visual blurring, double vision, glaucoma, cataracts  Ears/Nose/Throat: negative for, nasal congestion, postnasal drainage, hearing loss, deafness, tinnitus, vertigo  Respiratory: No shortness of breath, dyspnea on exertion, cough, or hemoptysis  Cardiovascular: negative for, palpitations, tachycardia, irregular heart beat and chest pain  Gastrointestinal: negative for, poor appetite, dysphagia, nausea, vomiting and heartburn  Genitourinary: negative for, nocturia, dysuria, frequency, urgency and hesitancy  Musculoskeletal: negative for, back pain, neck pain, arthritis and joint pain  Neurologic: positive for headaches    A comprehensive review of systems was performed and found to be negative except as described here or above.  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 mgr.    Tobacco Use     Smoking status: Never Smoker     Smokeless tobacco: Never Used     Tobacco comment: No smokers in home.   Vaping Use     Vaping Use: Never used   Substance and Sexual Activity     Alcohol use: No     Drug use: No     Sexual activity: Never   Other Topics Concern     Parent/sibling w/ CABG, MI or angioplasty before 65F 55M? Not Asked   Social History Narrative     Not on file     Social Determinants of Health     Financial Resource Strain: Not on file   Food Insecurity: Not on file   Transportation Needs: Not on file   Physical Activity: Not on file   Stress: Not on file   Social Connections: Not on file   Intimate Partner Violence: Not on file   Housing Stability: Not on file     FAMILY MEDICAL HISTORY:   Family History   Problem Relation Age of Onset     Coronary Artery Disease No family hx of      Colon Cancer No family hx of      Mental Illness No family hx of      Obesity No family hx of      Osteoporosis No family hx of      Depression No family hx of      Cerebrovascular Disease No family hx of      PHYSICAL EXAM:   BP (!) 148/89   Pulse 79   Wt 121.9 kg (268 lb 12.8 oz)   SpO2 98%   BMI 39.13 kg/m    GENERAL APPEARANCE: alert and no distress  EYES: nonicteric  HENT: mouth without ulcers or lesions  NECK: supple, no adenopathy  RESP: lungs clear to auscultation   CV: regular rhythm, normal rate, no rub  ABDOMEN: soft, nontender, normal bowel sounds, no HSM   Extremities: no clubbing, cyanosis, or edema  MS: no evidence of inflammation in joints, no muscle tenderness  SKIN: no rash  NEURO: mentation intact and speech normal  PSYCH: affect normal/bright   LABS:   No results found for this or any previous visit (from the past 672 hour(s)).  CMP  Recent Labs   Lab Test 01/24/21 2111 06/24/20 2020  06/11/18  0650 06/06/18 2125 07/05/17  1202    141 142 143 145*   POTASSIUM 3.4 3.3* 3.6 4.1 3.6   CHLORIDE 109 109 108 109 110*   CO2 27 29 24 25 29   ANIONGAP 5 3 10 9 6   * 96 127* 92 94   BUN 17 12 10 14 10   CR 0.76 0.73 0.84 0.86 0.71   GFRESTIMATED >90 >90 78 75 >90  Non  GFR Calc     GFRESTBLACK >90 >90 >90 >90 >90  African American GFR Calc     BOBBY 8.9 8.4* 8.4* 8.8 8.7   PROTTOTAL 7.0 7.3  --  7.2 6.7*   ALBUMIN 3.8 3.9  --  3.9 3.7   BILITOTAL 0.2 0.3  --  0.2 0.3   ALKPHOS 57 61  --  63 53   AST 7 8  --  8 9   ALT 13 18  --  20 20     CBC  Recent Labs   Lab Test 01/24/21  2111 06/24/20 2020 06/11/18  0650 06/06/18 2125   HGB 13.1 13.7 14.2 13.6   WBC 12.2* 10.0 11.7* 11.4*   RBC 4.53 4.74 4.78 4.72   HCT 38.7 40.8 41.0 41.0   MCV 85 86 86 87   MCH 28.9 28.9 29.7 28.8   MCHC 33.9 33.6 34.6 33.2   RDW 12.8 12.2 13.4 12.9    244 179 237     INRNo lab results found.  ABGNo lab results found.   URINE STUDIES  Recent Labs   Lab Test 06/11/18  0800 06/06/18  2330 07/05/17  1300   COLOR Yellow Yellow Yellow   APPEARANCE Clear Clear Cloudy   URINEGLC Negative Negative Negative   URINEBILI Negative Negative Negative   URINEKETONE Negative Negative Negative   SG 1.017 1.020 1.019   UBLD Negative Negative Negative   URINEPH 7.0 6.0 6.0   PROTEIN Negative Negative Negative   NITRITE Negative Negative Negative   LEUKEST Negative Negative Negative   RBCU 1 <1 1   WBCU 1 1 <1     No lab results found.    ASSESSMENT AND PLAN:   #Hypertension: no evidence of renal artery stenosis on recent imaging. Given the positive family history there could be a genetic component but also obesity and high sodium intake are also contributory. Also the borderline low potassium level would increase her BP and be indicative of hyperaldosteronism. Will recheck a CMP in one week along with a magnesium level and if the potassium is still wnl order a renin negra level. It will need to be interpreted in the  setting of losartan use. Will also order a TSH level, a UACR and a UPCR. The patient was counseled at length about the necessity of lowering her sodium intake and adopting a DASH diet in addition to losing weight and exercise. Amlodipine was also increased to 10 mg daily given  persistent uncontrolled BP and the patient will start keeping a BP diary in one week.    #Microscopic hematuria: UA was given in the context of menses, therefore will reorder.      The total time of this encounter amounted to 60 minutes. This time included time spent with the patient, reviewing records, ordering tests, and performing post visit documentation.   Labs ordered include: CBC with diff, Renal Panel, CMP, UA with microscopy, UPCR, UACR, vitamin D levels, iPTH levels    Sri Amaral MD  Division of Renal Disease and Hypertension  July 18, 2022  2:33 PM

## 2022-07-18 NOTE — PROGRESS NOTES
Nephrology Clinic    Leah Holloway MRN:[id number] YOB: 1985  Date of Service: 07/18/2022  Primary care provider: Micaela Koo  Requesting physician:  Micaela Koo        REASON FOR CONSULT: Uncontrolled Hypertension    HISTORY OF PRESENT ILLNESS:   Leah Holloway is a 36 year old female who presents for evaluation of uncontrolled hypertension.  The patient has a history of hypertension since at least 2008 thought to be secondary to right renal artery stenosis for which she underwent right midrenal artery 5 mm angioplasty for stenosis related to fibromuscular  dysplasia in the [facility] on 7/17/2008. She also has a h/o morbid obesity (BMI around 38) and seasonal allergies. She was off medications for several years however she was noticed to have again an elevated BP a few months ago in the setting of headaches and presented to the ED on 7/04 and 7/05 with hypertensive crisis.  She was started then on lisinopril 10 mg daily  And amlodipine 5 mg daily but  switched  from lisinopril to losartan 25 mg at bedtime due to fatigue a few days ago. She gained 10 lbs over the past two weeks. Drinks coffee occasionally and in moderation. Also very rare alcohol intake.   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  Proximal superior aneurysm measures 7.8 mm in diameter. Artery between  the aneurysms measures 4.3 mm in diameter. Inferior more distal  aneurysm measures 8.6 mm in diameter. In 2018, the proximal aneurysm  measured 7.8 mm and the distal aneurysm measured 8.4 mm.      Two right and single left renal arteries. Superior right renal artery  is the main renal artery. Smaller inferior right renal artery  originates anteriorly from the aorta 16 mm inferior to the main  superior right renal artery and supplies a portion of the ventral  lower  pole.     No renal artery stenosis or dissection demonstrated. No renal artery  arterial beading suggested.     Patent aorta without aneurysm, dissection, or stenosis. Celiac,  superior mesenteric, and inferior mesenteric arteries are patent  without stenosis, beading, or aneurysm. Bilateral common, internal,  and external iliac arteries patent. Bilateral corona mortis. Bilateral  common femoral arteries patent.    Her maternal grandmother has hypertension and her father started having hypertension at the age of 55.    Her renal function is intact however she had low potassium levels in the past and her most recent potassium level is 3.5. Her UA shows some hematuria but no proteinuria.    PAST MEDICAL HISTORY:  No past medical history on file.  PAST SURGICAL HISTORY:  Past Surgical History:   Procedure Laterality Date     ARTHROSCOPY KNEE Right 1/22/2016    Procedure: ARTHROSCOPY KNEE;  Surgeon: Ian Jaimes MD;  Location: [facility] OR     GENITOURINARY SURGERY       HC KNEE SCOPE,PART SYNOVECT Right 1/22/16    Medial plica excision - WORK COMP     MEDICATIONS:  Prescription Medications as of 7/18/2022       Rx Number Disp Refills Start End Last Dispensed Date Next Fill Date Owning Pharmacy    amLODIPine (NORVASC) 5 MG tablet    7/6/2022        Class: Historical    BP MONITOR-STETHOSCOPE KIT  1 kit 0 5/16/2008    [facility] [address] [place] [id number] [facility]    Sig: test as directed    Class: Fax    Notes to Pharmacy: Please sub electronic BP monitor if available    Route: Does not apply    FLONASE 50 MCG/ACT NA SUSP  3 Bottle 3 8/24/2010    [facility] [address] [place] [id number] [facility]    Sig: USE 2 SPRAYS IN EACH NOSTRIL ONCE DAILY    Class: Fax    Route: Nasal    losartan (COZAAR) 25 MG tablet  30 tablet 0 7/15/2022    [facility] [address] [place] [address] [address]    Sig: Take 1 tablet (25 mg) by mouth daily    Class: E-Prescribe    Route: Oral    cetirizine (ZYRTEC) 5 MG/5ML  solution  118 mL 0 6/7/2018        Sig: Take 10 mLs (10 mg) by mouth 2 times daily as needed for allergies (hives, or vertigo)    Class: Historical    Route: Oral    IBUPROFEN PO            Sig: Take 200 mg by mouth    Class: Historical    Route: Oral    methylPREDNISolone (MEDROL DOSEPAK) 4 MG tablet therapy pack  21 tablet 0 8/7/2020    [facility] [address] [place] [address] [address]    Sig: Follow Package Directions    Class: E-Prescribe         ALLERGIES:    Allergies   Allergen Reactions     Aluminum Hives     Reaction when pt comes into contact with aluminum.     Benadryl [Anti-Itch]      Unknown [No Clinical Screening - See Comments] Rash     Adhesives     REVIEW OF SYSTEMS:  Review Of Systems  Skin: negative, negative for, pigmentation, acne, rash, scaling, itching  Eyes: negative for, visual blurring, double vision, glaucoma, cataracts  Ears/Nose/Throat: negative for, nasal congestion, postnasal drainage, hearing loss, deafness, tinnitus, vertigo  Respiratory: No shortness of breath, dyspnea on exertion, cough, or hemoptysis  Cardiovascular: negative for, palpitations, tachycardia, irregular heart beat and chest pain  Gastrointestinal: negative for, poor appetite, dysphagia, nausea, vomiting and heartburn  Genitourinary: negative for, nocturia, dysuria, frequency, urgency and hesitancy  Musculoskeletal: negative for, back pain, neck pain, arthritis and joint pain  Neurologic: positive for headaches    A comprehensive review of systems was performed and found to be negative except as described here or above.  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 mgr.    Tobacco Use     Smoking status: Never Smoker     Smokeless tobacco: Never Used     Tobacco comment: No smokers  in home.   Vaping Use     Vaping Use: Never used   Substance and Sexual Activity     Alcohol use: No     Drug use: No     Sexual activity: Never   Other Topics Concern     Parent/sibling w/ CABG, MI or angioplasty before 65F 55M? Not Asked   Social History Narrative     Not on file     Social Determinants of Health     Financial Resource Strain: Not on file   Food Insecurity: Not on file   Transportation Needs: Not on file   Physical Activity: Not on file   Stress: Not on file   Social Connections: Not on file   Intimate Partner Violence: Not on file   Housing Stability: Not on file     FAMILY MEDICAL HISTORY:   Family History   Problem Relation Age of Onset     Coronary Artery Disease No family hx of      Colon Cancer No family hx of      Mental Illness No family hx of      Obesity No family hx of      Osteoporosis No family hx of      Depression No family hx of      Cerebrovascular Disease No family hx of      PHYSICAL EXAM:   BP (!) 148/89   Pulse 79   Wt 121.9 kg (268 lb 12.8 oz)   SpO2 98%   BMI 39.13 kg/m    GENERAL APPEARANCE: alert and no distress  EYES: nonicteric  HENT: mouth without ulcers or lesions  NECK: supple, no adenopathy  RESP: lungs clear to auscultation   CV: regular rhythm, normal rate, no rub  ABDOMEN: soft, nontender, normal bowel sounds, no HSM   Extremities: no clubbing, cyanosis, or edema  MS: no evidence of inflammation in joints, no muscle tenderness  SKIN: no rash  NEURO: mentation intact and speech normal  PSYCH: affect normal/bright   LABS:   No results found for this or any previous visit (from the past 672 hour(s)).  CMP  Recent Labs   Lab Test 01/24/21  2111 06/24/20  2020 06/11/18  0650 06/06/18  2125 07/05/17  1202    141 142 143 145*   POTASSIUM 3.4 3.3* 3.6 4.1 3.6   CHLORIDE 109 109 108 109 110*   CO2 27 29 24 25 29   ANIONGAP 5 3 10 9 6   * 96 127* 92 94   BUN 17 12 10 14 10   CR 0.76 0.73 0.84 0.86 0.71   GFRESTIMATED >90 >90 78 75 >90  Non   GFR Calc     GFRESTBLACK >90 >90 >90 >90 >90  African American GFR Calc     BOBBY 8.9 8.4* 8.4* 8.8 8.7   PROTTOTAL 7.0 7.3  --  7.2 6.7*   ALBUMIN 3.8 3.9  --  3.9 3.7   BILITOTAL 0.2 0.3  --  0.2 0.3   ALKPHOS 57 61  --  63 53   AST 7 8  --  8 9   ALT 13 18  --  20 20     CBC  Recent Labs   Lab Test 01/24/21 2111 06/24/20 2020 06/11/18  0650 06/06/18 2125   HGB 13.1 13.7 14.2 13.6   WBC 12.2* 10.0 11.7* 11.4*   RBC 4.53 4.74 4.78 4.72   HCT 38.7 40.8 41.0 41.0   MCV 85 86 86 87   MCH 28.9 28.9 29.7 28.8   MCHC 33.9 33.6 34.6 33.2   RDW 12.8 12.2 13.4 12.9    244 179 237     INRNo lab results found.  ABGNo lab results found.   URINE STUDIES  Recent Labs   Lab Test 06/11/18  0800 06/06/18  2330 07/05/17  1300   COLOR Yellow Yellow Yellow   APPEARANCE Clear Clear Cloudy   URINEGLC Negative Negative Negative   URINEBILI Negative Negative Negative   URINEKETONE Negative Negative Negative   SG 1.017 1.020 1.019   UBLD Negative Negative Negative   URINEPH 7.0 6.0 6.0   PROTEIN Negative Negative Negative   NITRITE Negative Negative Negative   LEUKEST Negative Negative Negative   RBCU 1 <1 1   WBCU 1 1 <1     No lab results found.    ASSESSMENT AND PLAN:   #Hypertension: no evidence of renal artery stenosis on recent imaging. Given the positive family history there could be a genetic component but also obesity and high sodium intake are also contributory. Also the borderline low potassium level would increase her BP and be indicative of hyperaldosteronism. Will recheck a CMP in one week along with a magnesium level and if the potassium is still wnl order a renin negra level. It will need to be interpreted in the setting of losartan use. Will also order a TSH level, a UACR and a UPCR. The patient was counseled at length about the necessity of lowering her sodium intake and adopting a DASH diet in addition to losing weight and exercise. Amlodipine was also increased to 10 mg daily given  persistent uncontrolled BP and  the patient will start keeping a BP diary in one week.    #Microscopic hematuria: UA was given in the context of menses, therefore will reorder.      The total time of this encounter amounted to 60 minutes. This time included time spent with the patient, reviewing records, ordering tests, and performing post visit documentation.   Labs ordered include: CBC with diff, Renal Panel, CMP, UA with microscopy, UPCR, UACR, vitamin D levels, iPTH levels    Sri Amaral MD  Division of Renal Disease and Hypertension  July 18, 2022  2:33 PM

## 2022-07-18 NOTE — PATIENT INSTRUCTIONS
It was a pleasure taking care of you today.  I've included a brief summary of our discussion and care plan from today's visit below.  Please review this information with your primary care provider.  _______________________________________________________________________    My recommendations are summarized as follows:  -Keep the amount of sodium in your diet at 2.4 g/day (also see instructions attached in that regard)  -Keep a Blood Pressure diary by taking your blood pressure twice a day as instructed (also see instructions attached in that regard). Please make sure that you are using a validated blood pressure device by checking that it is the case at: https://www.validatebp.org/  -Avoid all NSAID's. Examples include Ibuprofen (Advil, Motrin), naprosyn (Aleve), celebrex among others. Acetaminophen (Tylenol) is ok with maximum dose in 24 hours of 3200 mg.  -Healthy lifestyle measures will keep your kidney's functioning at their current best. This includes regular exercise, weight loss and smoking cessation if you smoke.   -Do not exceed one alcoholic drink per day  -Increase amlodipine to 10 mg daily  -Do labs and urine tests in one week        To schedule imaging please call [phone]     To schedule your lab appointment at the [facility], please call       Return to Nephrology Clinic in 3 weeks to review your progress.    _______________________________________________________________________    Who do I call with any questions after my visit?    Please be in touch if there are any further questions that arise following today's visit.  There are multiple ways to contact your nephrology care team.      During business hours, you may reach your Nephrology LPN Care Coordinator, Cate, at .      To schedule or reschedule an appointment, please call [phone].    You can always send a secure message through [employer]Master.  [employer]Master messages are answered by your nurse or doctor  typically within 24 hours.  Please allow extra time on weekends and holidays.      For urgent/emergent questions after business hours, you may reach the on-call Nephrology Fellow by contacting the [facility]  at [phone].     How will I get the results of any tests ordered?    You will receive all of your results.  If you have signed up for [employer], any tests ordered at your visit will be available to you after your physician reviews them.  Typically this takes 1-2 weeks.  If there are urgent results that require a change in your care plan, your physician or nurse will call you to discuss the next steps.      What is [employer]?  Worklight is a secure way for you to access all of your healthcare records from the [facility].  It is a web based computer program, so you can sign on to it from any location.  It also allows you to send secure messages to your care team.  I recommend signing up for Worklight access if you have not already done so and are comfortable with using a computer.      How do I schedule a follow-up visit?  If you did not schedule a follow-up visit today, please call [phone] to schedule a follow-up office visit.        Sincerely,      Dr. [facility]  Division of Nephrology and Hypertension

## 2022-07-18 NOTE — NURSING NOTE
Chief Complaint   Patient presents with     Consult     Severe Hypertension     Blood pressure (!) 148/89, pulse 79, weight 121.9 kg (268 lb 12.8 oz), SpO2 98 %, not currently breastfeeding.    Liliane Mcgill

## 2022-07-25 ENCOUNTER — LAB (OUTPATIENT)
Dept: LAB | Facility: CLINIC | Age: 37
End: 2022-07-25
Payer: COMMERCIAL

## 2022-07-25 DIAGNOSIS — I12.9 HYPERTENSION, RENAL: ICD-10-CM

## 2022-07-25 LAB
ALBUMIN SERPL-MCNC: 3.4 G/DL (ref 3.4–5)
ALBUMIN UR-MCNC: NEGATIVE MG/DL
ALP SERPL-CCNC: 50 U/L (ref 40–150)
ALT SERPL W P-5'-P-CCNC: 18 U/L (ref 0–50)
ANION GAP SERPL CALCULATED.3IONS-SCNC: 6 MMOL/L (ref 3–14)
APPEARANCE UR: CLEAR
AST SERPL W P-5'-P-CCNC: 10 U/L (ref 0–45)
BACTERIA #/AREA URNS HPF: ABNORMAL /HPF
BILIRUB SERPL-MCNC: 0.4 MG/DL (ref 0.2–1.3)
BILIRUB UR QL STRIP: NEGATIVE
BUN SERPL-MCNC: 10 MG/DL (ref 7–30)
CALCIUM SERPL-MCNC: 8 MG/DL (ref 8.5–10.1)
CHLORIDE BLD-SCNC: 109 MMOL/L (ref 94–109)
CO2 SERPL-SCNC: 25 MMOL/L (ref 20–32)
COLOR UR AUTO: YELLOW
CREAT SERPL-MCNC: 0.67 MG/DL (ref 0.52–1.04)
GFR SERPL CREATININE-BSD FRML MDRD: >90 ML/MIN/1.73M2
GLUCOSE BLD-MCNC: 101 MG/DL (ref 70–99)
GLUCOSE UR STRIP-MCNC: NEGATIVE MG/DL
HGB UR QL STRIP: ABNORMAL
KETONES UR STRIP-MCNC: NEGATIVE MG/DL
LEUKOCYTE ESTERASE UR QL STRIP: NEGATIVE
MAGNESIUM SERPL-MCNC: 2.1 MG/DL (ref 1.6–2.3)
MUCOUS THREADS #/AREA URNS LPF: PRESENT /LPF
NITRATE UR QL: NEGATIVE
PH UR STRIP: 6 [PH] (ref 5–7)
POTASSIUM BLD-SCNC: 3.5 MMOL/L (ref 3.4–5.3)
PROT SERPL-MCNC: 6.5 G/DL (ref 6.8–8.8)
RBC URINE: 4 /HPF
SODIUM SERPL-SCNC: 140 MMOL/L (ref 133–144)
SP GR UR STRIP: 1.01 (ref 1–1.03)
SQUAMOUS EPITHELIAL: 3 /HPF
TSH SERPL DL<=0.005 MIU/L-ACNC: 3.06 MU/L (ref 0.4–4)
UROBILINOGEN UR STRIP-MCNC: NORMAL MG/DL
WBC URINE: 1 /HPF

## 2022-07-25 PROCEDURE — 82043 UR ALBUMIN QUANTITATIVE: CPT

## 2022-07-25 PROCEDURE — 80053 COMPREHEN METABOLIC PANEL: CPT

## 2022-07-25 PROCEDURE — 84443 ASSAY THYROID STIM HORMONE: CPT

## 2022-07-25 PROCEDURE — 81001 URINALYSIS AUTO W/SCOPE: CPT

## 2022-07-25 PROCEDURE — 83735 ASSAY OF MAGNESIUM: CPT

## 2022-07-25 PROCEDURE — 84156 ASSAY OF PROTEIN URINE: CPT

## 2022-07-25 PROCEDURE — 36415 COLL VENOUS BLD VENIPUNCTURE: CPT

## 2022-07-26 LAB
CREAT UR-MCNC: 80 MG/DL
CREAT UR-MCNC: 80 MG/DL
MICROALBUMIN UR-MCNC: 8 MG/L
MICROALBUMIN/CREAT UR: 10 MG/G CR (ref 0–25)
PROT UR-MCNC: 0.09 G/L
PROT/CREAT 24H UR: 0.11 G/G CR (ref 0–0.2)

## 2022-08-08 ENCOUNTER — VIRTUAL VISIT (OUTPATIENT)
Dept: NEPHROLOGY | Facility: CLINIC | Age: 37
End: 2022-08-08
Attending: INTERNAL MEDICINE
Payer: COMMERCIAL

## 2022-08-08 VITALS
WEIGHT: 254 LBS | SYSTOLIC BLOOD PRESSURE: 126 MMHG | HEIGHT: 70 IN | DIASTOLIC BLOOD PRESSURE: 69 MMHG | BODY MASS INDEX: 36.36 KG/M2

## 2022-08-08 DIAGNOSIS — I10 SEVERE HYPERTENSION: ICD-10-CM

## 2022-08-08 PROCEDURE — G0463 HOSPITAL OUTPT CLINIC VISIT: HCPCS | Mod: PN,RTG | Performed by: INTERNAL MEDICINE

## 2022-08-08 PROCEDURE — 99214 OFFICE O/P EST MOD 30 MIN: CPT | Mod: GT | Performed by: INTERNAL MEDICINE

## 2022-08-08 RX ORDER — LOSARTAN POTASSIUM 25 MG/1
50 TABLET ORAL DAILY
Qty: 30 TABLET | Refills: 0 | Status: SHIPPED | OUTPATIENT
Start: 2022-08-08 | End: 2022-09-12

## 2022-08-08 ASSESSMENT — PAIN SCALES - GENERAL: PAINLEVEL: NO PAIN (0)

## 2022-08-08 ASSESSMENT — PATIENT HEALTH QUESTIONNAIRE - PHQ9: SUM OF ALL RESPONSES TO PHQ QUESTIONS 1-9: 11

## 2022-08-08 NOTE — PATIENT INSTRUCTIONS
It was a pleasure taking care of you today.  I've included a brief summary of our discussion and care plan from today's visit below.  Please review this information with your primary care provider.  _______________________________________________________________________    My recommendations are summarized as follows:  -Keep the amount of sodium in your diet at 2.4 g/day (also see instructions attached in that regard)  -Keep a Blood Pressure diary by taking your blood pressure twice a day as instructed (also see instructions attached in that regard). Please make sure that you are using a validated blood pressure device by checking that it is the case at: https://www.validatebp.org/  -Avoid all NSAID's. Examples include Ibuprofen (Advil, Motrin), naprosyn (Aleve), celebrex among others. Acetaminophen (Tylenol) is ok with maximum dose in 24 hours of 3200 mg.  -Healthy lifestyle measures will keep your kidney's functioning at their current best. This includes regular exercise, weight loss and smoking cessation if you smoke.   -Do not exceed one alcoholic drink per day  -Increase losartan to 50 mg in the evening  -Do labs in 2 weeks  -Communicate the blood pressure numbers      To schedule imaging please call [phone]     To schedule your lab appointment at the [facility], please call       Return to Nephrology Clinic in 6 months with labs to review your progress.    _______________________________________________________________________    Who do I call with any questions after my visit?    Please be in touch if there are any further questions that arise following today's visit.  There are multiple ways to contact your nephrology care team.      During business hours, you may reach your Nephrology LPN Care Coordinator, Cate, at .      To schedule or reschedule an appointment, please call [phone].    You can always send a secure message through [employer].  [employer]  messages are answered by your nurse or doctor typically within 24 hours.  Please allow extra time on weekends and holidays.      For urgent/emergent questions after business hours, you may reach the on-call Nephrology Fellow by contacting the [facility]  at [phone].     How will I get the results of any tests ordered?    You will receive all of your results.  If you have signed up for WhatsAppt, any tests ordered at your visit will be available to you after your physician reviews them.  Typically this takes 1-2 weeks.  If there are urgent results that require a change in your care plan, your physician or nurse will call you to discuss the next steps.      What is [employer]?  [employer] is a secure way for you to access all of your healthcare records from the [facility].  It is a web based computer program, so you can sign on to it from any location.  It also allows you to send secure messages to your care team.  I recommend signing up for [employer] access if you have not already done so and are comfortable with using a computer.      How do I schedule a follow-up visit?  If you did not schedule a follow-up visit today, please call [phone] to schedule a follow-up office visit.        Sincerely,      Dr. Sri Amaral  [facility]  Division of Nephrology and Hypertension

## 2022-08-08 NOTE — LETTER
8/8/2022       RE: Leah Holloway  [address]  [facility] [address]     Dear Colleague,    Thank you for referring your patient, Leah Holloway, to the [facility] [place] at [facility]. Please see a copy of my visit note below.    Nephrology Clinic    Leah Holloway MRN:[id number] YOB: 1985  Date of Service: 08/08/2022  Primary care provider: Micaela Koo  Requesting physician: Micaela Koo        REASON FOR CONSULT: Hypertension    HISTORY OF PRESENT ILLNESS:   Leah Holloway is a 36 year old female who presents for evaluation of uncontrolled hypertension.  The patient has a history of hypertension since at least 2008 thought to be secondary to right renal artery stenosis for which she underwent right midrenal artery 5 mm angioplasty for stenosis related to fibromuscular  dysplasia in the [facility] on 7/17/2008. She also has a h/o morbid obesity (BMI around 38) and seasonal allergies. She was off medications for several years however she was noticed to have again an elevated BP a few months ago in the setting of headaches and presented to the ED on 7/04 and 7/05 with hypertensive crisis.  She was started then on lisinopril 10 mg daily  And amlodipine 5 mg daily but  switched a few days later from lisinopril to losartan 25 mg at bedtime due to fatigue . She gained 10 lbs over the past few weeks weeks. Drinks coffee occasionally and in moderation. Also very rare alcohol intake.   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  Proximal superior aneurysm measures 7.8 mm in diameter. Artery between  the aneurysms measures 4.3 mm in diameter. Inferior more distal  aneurysm measures 8.6 mm in diameter. In 2018, the proximal aneurysm  measured 7.8 mm and the distal  aneurysm measured 8.4 mm.      Two right and single left renal arteries. Superior right renal artery  is the main renal artery. Smaller inferior right renal artery  originates anteriorly from the aorta 16 mm inferior to the main  superior right renal artery and supplies a portion of the ventral  lower pole.     No renal artery stenosis or dissection demonstrated. No renal artery  arterial beading suggested.     Patent aorta without aneurysm, dissection, or stenosis. Celiac,  superior mesenteric, and inferior mesenteric arteries are patent  without stenosis, beading, or aneurysm. Bilateral common, internal,  and external iliac arteries patent. Bilateral corona mortis. Bilateral  common femoral arteries patent.     Her maternal grandmother has hypertension and her father started having hypertension at the age of 55.     Her renal function is intact however she had low potassium levels in the past and her most recent potassium level is 3.5. Her UA shows some hematuria but no proteinuria. A UPCR done on 7/25 is 0.11 g/g Cr.  On her last visit, amlodipine was increased to 10 mg daily. As a result natalie BP is now controlled in the morning but remains elevated in the evening.    PAST MEDICAL HISTORY:  No past medical history on file.  PAST SURGICAL HISTORY:  Past Surgical History:   Procedure Laterality Date     ARTHROSCOPY KNEE Right 1/22/2016    Procedure: ARTHROSCOPY KNEE;  Surgeon: Ian Jaimes MD;  Location: [facility] OR     GENITOURINARY SURGERY       HC KNEE SCOPE,PART SYNOVECT Right 1/22/16    Medial plica excision - WORK COMP     MEDICATIONS:  Prescription Medications as of 8/8/2022       Rx Number Disp Refills Start End Last Dispensed Date Next Fill Date Owning Pharmacy    amLODIPine (NORVASC) 10 MG tablet  120 tablet 3 7/18/2022    [facility] [id number] - [place], [place] - [address]    Sig: Take 1 tablet (10 mg) by mouth daily    Class: E-Prescribe    Route: Oral    BP MONITOR-STETHOSCOPE KIT  1 kit 0  5/16/2008    [facility] [address] [place] [id number] [facility]    Sig: test as directed    Class: Fax    Notes to Pharmacy: Please sub electronic BP monitor if available    Route: Does not apply    FLONASE 50 MCG/ACT NA SUSP  3 Bottle 3 8/24/2010    [facility] [address] [place] [id number] [facility]    Sig: USE 2 SPRAYS IN EACH NOSTRIL ONCE DAILY    Class: Fax    Route: Nasal    losartan (COZAAR) 25 MG tablet  30 tablet 0 7/15/2022    [facility] [address] [place] [address] [address]    Sig: Take 1 tablet (25 mg) by mouth daily    Class: E-Prescribe    Route: Oral    amLODIPine (NORVASC) 5 MG tablet    7/6/2022        Class: Historical         ALLERGIES:    Allergies   Allergen Reactions     Aluminum Hives     Reaction when pt comes into contact with aluminum.     Benadryl [Anti-Itch]      Unknown [No Clinical Screening - See Comments] Rash     Adhesives     REVIEW OF SYSTEMS:  Review Of Systems  Skin: negative for, pigmentation, acne, rash, scaling  Eyes: negative for, visual blurring, double vision, glaucoma, cataracts  Ears/Nose/Throat: negative for, nasal congestion, purulent rhinorrhea, sneezing, postnasal drainage, hearing loss  Respiratory: No shortness of breath, dyspnea on exertion, cough, or hemoptysis  Cardiovascular: negative for, palpitations, tachycardia, irregular heart beat and chest pain  Gastrointestinal: negative for, poor appetite, dysphagia, nausea, vomiting and heartburn  Genitourinary: negative for, nocturia, dysuria, frequency and urgency  Musculoskeletal: negative for, fracture, back pain, neck pain and arthritis  Neurologic: negative for, migraine headaches, syncope, stroke, seizures and paralysis    A comprehensive review of systems was performed and found to be negative except as described here or above.  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 mgr.    Tobacco Use     Smoking status: Never Smoker     Smokeless tobacco: Never Used     Tobacco comment: No smokers in home.   Vaping Use     Vaping Use: Never used   Substance and Sexual Activity     Alcohol use: No     Drug use: No     Sexual activity: Never   Other Topics Concern     Parent/sibling w/ CABG, MI or angioplasty before 65F 55M? Not Asked   Social History Narrative     Not on file     Social Determinants of Health     Financial Resource Strain: Not on file   Food Insecurity: Not on file   Transportation Needs: Not on file   Physical Activity: Not on file   Stress: Not on file   Social Connections: Not on file   Intimate Partner Violence: Not on file   Housing Stability: Not on file     FAMILY MEDICAL HISTORY:   Family History   Problem Relation Age of Onset     Coronary Artery Disease No family hx of      Colon Cancer No family hx of      Mental Illness No family hx of      Obesity No family hx of      Osteoporosis No family hx of      Depression No family hx of      Cerebrovascular Disease No family hx of      PHYSICAL EXAM:   /69   Ht 1.778 m (5' 10\")   Wt 115.2 kg (254 lb)   BMI 36.45 kg/m    GENERAL APPEARANCE: alert and no distress  EYES: nonicteric  HENT: mouth without ulcers or lesions  NECK: supple, no adenopathy  RESP: lungs clear to auscultation   CV: regular rhythm, normal rate, no rub  ABDOMEN: soft, nontender, normal bowel sounds, no HSM   Extremities: no clubbing, cyanosis, or edema  MS: no evidence of inflammation in joints, no muscle tenderness  SKIN: no rash  NEURO: mentation intact and speech normal  PSYCH: affect normal/bright   LABS:   Recent Results (from the past 672 hour(s))   Comprehensive metabolic panel    Collection Time: 07/25/22 12:08 PM   Result Value Ref Range    Sodium 140 133 - 144 mmol/L    Potassium 3.5 3.4 - 5.3 mmol/L    Chloride 109 94 - " 109 mmol/L    Carbon Dioxide (CO2) 25 20 - 32 mmol/L    Anion Gap 6 3 - 14 mmol/L    Urea Nitrogen 10 7 - 30 mg/dL    Creatinine 0.67 0.52 - 1.04 mg/dL    Calcium 8.0 (L) 8.5 - 10.1 mg/dL    Glucose 101 (H) 70 - 99 mg/dL    Alkaline Phosphatase 50 40 - 150 U/L    AST 10 0 - 45 U/L    ALT 18 0 - 50 U/L    Protein Total 6.5 (L) 6.8 - 8.8 g/dL    Albumin 3.4 3.4 - 5.0 g/dL    Bilirubin Total 0.4 0.2 - 1.3 mg/dL    GFR Estimate >90 >60 mL/min/1.73m2   Magnesium    Collection Time: 07/25/22 12:08 PM   Result Value Ref Range    Magnesium 2.1 1.6 - 2.3 mg/dL   TSH    Collection Time: 07/25/22 12:08 PM   Result Value Ref Range    TSH 3.06 0.40 - 4.00 mU/L   UA with Microscopic    Collection Time: 07/25/22 12:13 PM   Result Value Ref Range    Color Urine Yellow Colorless, Straw, Light Yellow, Yellow    Appearance Urine Clear Clear    Glucose Urine Negative Negative mg/dL    Bilirubin Urine Negative Negative    Ketones Urine Negative Negative mg/dL    Specific Gravity Urine 1.010 1.003 - 1.035    Blood Urine Large (A) Negative    pH Urine 6.0 5.0 - 7.0    Protein Albumin Urine Negative Negative mg/dL    Urobilinogen Urine Normal Normal, 2.0 mg/dL    Nitrite Urine Negative Negative    Leukocyte Esterase Urine Negative Negative    Bacteria Urine Few (A) None Seen /HPF    Mucus Urine Present (A) None Seen /LPF    RBC Urine 4 (H) <=2 /HPF    WBC Urine 1 <=5 /HPF    Squamous Epithelials Urine 3 (H) <=1 /HPF   Albumin Random Urine Quantitative with Creat Ratio    Collection Time: 07/25/22 12:13 PM   Result Value Ref Range    Creatinine Urine mg/dL 80 mg/dL    Albumin Urine mg/L 8 mg/L    Albumin Urine mg/g Cr 10.00 0.00 - 25.00 mg/g Cr   Protein  random urine    Collection Time: 07/25/22 12:13 PM   Result Value Ref Range    Total Protein Random Urine g/L 0.09 g/L    Total Protein Urine g/gr Creatinine 0.11 0.00 - 0.20 g/g Cr    Creatinine Urine mg/dL 80 mg/dL     CMP  Recent Labs   Lab Test 07/25/22  1208 01/24/21  4256  06/24/20 2020 06/11/18  0650 06/06/18 2125    141 141 142 143   POTASSIUM 3.5 3.4 3.3* 3.6 4.1   CHLORIDE 109 109 109 108 109   CO2 25 27 29 24 25   ANIONGAP 6 5 3 10 9   * 101* 96 127* 92   BUN 10 17 12 10 14   CR 0.67 0.76 0.73 0.84 0.86   GFRESTIMATED >90 >90 >90 78 75   GFRESTBLACK  --  >90 >90 >90 >90   BOBBY 8.0* 8.9 8.4* 8.4* 8.8   MAG 2.1  --   --   --   --    PROTTOTAL 6.5* 7.0 7.3  --  7.2   ALBUMIN 3.4 3.8 3.9  --  3.9   BILITOTAL 0.4 0.2 0.3  --  0.2   ALKPHOS 50 57 61  --  63   AST 10 7 8  --  8   ALT 18 13 18  --  20     CBC  Recent Labs   Lab Test 01/24/21  2111 06/24/20 2020 06/11/18 0650 06/06/18 2125   HGB 13.1 13.7 14.2 13.6   WBC 12.2* 10.0 11.7* 11.4*   RBC 4.53 4.74 4.78 4.72   HCT 38.7 40.8 41.0 41.0   MCV 85 86 86 87   MCH 28.9 28.9 29.7 28.8   MCHC 33.9 33.6 34.6 33.2   RDW 12.8 12.2 13.4 12.9    244 179 237     INRNo lab results found.  ABGNo lab results found.   URINE STUDIES  Recent Labs   Lab Test 07/25/22  1213 06/11/18  0800 06/06/18  2330 07/05/17  1300   COLOR Yellow Yellow Yellow Yellow   APPEARANCE Clear Clear Clear Cloudy   URINEGLC Negative Negative Negative Negative   URINEBILI Negative Negative Negative Negative   URINEKETONE Negative Negative Negative Negative   SG 1.010 1.017 1.020 1.019   UBLD Large* Negative Negative Negative   URINEPH 6.0 7.0 6.0 6.0   PROTEIN Negative Negative Negative Negative   NITRITE Negative Negative Negative Negative   LEUKEST Negative Negative Negative Negative   RBCU 4* 1 <1 1   WBCU 1 1 1 <1     Recent Labs   Lab Test 07/25/22  1213   UTPG 0.11       ASSESSMENT AND PLAN:   #Hypertension: no evidence of renal artery stenosis on recent imaging. Given the positive family history there could be a genetic component but also obesity and high sodium intake are also contributory. Also the borderline low potassium level would increase her BP and be indicative of hyperaldosteronism. The BP has improved on amlodipine 10 mg daily and  losartan 25 mg at bedtime, however it remains sub optimal . Will increase losartan to 50 mg daily and recheck a CMP in 2 weeks. Will also screen her for primary hyperaldosteronism. The patient will also keep a BP diary and communicate numbers via my chart. She was counseled at length about the necessity of lowering her sodium intake and adopting a DASH diet in addition to losing weight and exercise.     #Microscopic hematuria: UA was given in the context of menses, therefore will reorder.       The total time of this encounter amounted to 40 minutes. This time included time spent with the patient, reviewing records, ordering tests, and performing post visit documentation.     The patient will return to follow up in 6 months.      Sri Amaral MD  Division of Renal Disease and Hypertension  August 8, 2022  3:24 PM

## 2022-08-08 NOTE — PROGRESS NOTES
Shawnee is a 36 year old who is being evaluated via a billable video visit.      How would you like to obtain your AVS? MyChart  If the video visit is dropped, the invitation should be resent by: Text to cell phone: [phone]  Will anyone else be joining your video visit? No        Video-Visit Details    Video Start Time: 3:39 pm    Type of service:  Video Visit    Video End Time:3:49 PM    Originating Location (pt. Location): Home    Distant Location (provider location):  [facility] NEPHROLOG[facility] [place]     Platform used for Video Visit: [facility]     Nephrology Clinic    Leah Holloway MRN:[id number] YOB: 1985  Date of Service: 08/08/2022  Primary care provider: Micaela Koo  Requesting physician: Micaela Koo        REASON FOR CONSULT: Hypertension    HISTORY OF PRESENT ILLNESS:   Leah Holloway is a 36 year old female who presents for evaluation of uncontrolled hypertension.  The patient has a history of hypertension since at least 2008 thought to be secondary to right renal artery stenosis for which she underwent right midrenal artery 5 mm angioplasty for stenosis related to fibromuscular  dysplasia in the [facility] on 7/17/2008. She also has a h/o morbid obesity (BMI around 38) and seasonal allergies. She was off medications for several years however she was noticed to have again an elevated BP a few months ago in the setting of headaches and presented to the ED on 7/04 and 7/05 with hypertensive crisis.  She was started then on lisinopril 10 mg daily  And amlodipine 5 mg daily but  switched a few days later from lisinopril to losartan 25 mg at bedtime due to fatigue . She gained 10 lbs over the past few weeks weeks. Drinks coffee occasionally and in moderation. Also very rare alcohol intake.   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  Proximal superior aneurysm measures 7.8 mm in diameter. Artery between  the aneurysms measures 4.3 mm in diameter. Inferior more distal  aneurysm measures 8.6 mm in diameter. In 2018, the proximal aneurysm  measured 7.8 mm and the distal aneurysm measured 8.4 mm.      Two right and single left renal arteries. Superior right renal artery  is the main renal artery. Smaller inferior right renal artery  originates anteriorly from the aorta 16 mm inferior to the main  superior right renal artery and supplies a portion of the ventral  lower pole.     No renal artery stenosis or dissection demonstrated. No renal artery  arterial beading suggested.     Patent aorta without aneurysm, dissection, or stenosis. Celiac,  superior mesenteric, and inferior mesenteric arteries are patent  without stenosis, beading, or aneurysm. Bilateral common, internal,  and external iliac arteries patent. Bilateral corona mortis. Bilateral  common femoral arteries patent.     Her maternal grandmother has hypertension and her father started having hypertension at the age of 55.     Her renal function is intact however she had low potassium levels in the past and her most recent potassium level is 3.5. Her UA shows some hematuria but no proteinuria. A UPCR done on 7/25 is 0.11 g/g Cr.  On her last visit, amlodipine was increased to 10 mg daily. As a result natalie BP is now controlled in the morning but remains elevated in the evening.    PAST MEDICAL HISTORY:  No past medical history on file.  PAST SURGICAL HISTORY:  Past Surgical History:   Procedure Laterality Date     ARTHROSCOPY KNEE Right 1/22/2016    Procedure: ARTHROSCOPY KNEE;  Surgeon: Ian Jaimes MD;  Location: [facility] OR     GENITOURINARY SURGERY       HC KNEE SCOPE,PART SYNOVECT Right 1/22/16    Medial plica excision - WORK COMP     MEDICATIONS:  Prescription Medications as of 8/8/2022       Rx Number Disp Refills Start End Last Dispensed Date Next Fill Date  [facility] Pharmacy    amLODIPine (NORVASC) 10 MG tablet  120 tablet 3 7/18/2022    [facility] [address] [place] [address] [address]    Sig: Take 1 tablet (10 mg) by mouth daily    Class: E-Prescribe    Route: Oral    BP MONITOR-STETHOSCOPE KIT  1 kit 0 5/16/2008    [facility] [address] [place] [id number] [facility]    Sig: test as directed    Class: Fax    Notes to Pharmacy: Please sub electronic BP monitor if available    Route: Does not apply    FLONASE 50 MCG/ACT NA SUSP  3 Bottle 3 8/24/2010    [facility] [address] [place] [id number] [facility]    Sig: USE 2 SPRAYS IN EACH NOSTRIL ONCE DAILY    Class: Fax    Route: Nasal    losartan (COZAAR) 25 MG tablet  30 tablet 0 7/15/2022    [facility] [address] [place] [address] [address]    Sig: Take 1 tablet (25 mg) by mouth daily    Class: E-Prescribe    Route: Oral    amLODIPine (NORVASC) 5 MG tablet    7/6/2022        Class: Historical         ALLERGIES:    Allergies   Allergen Reactions     Aluminum Hives     Reaction when pt comes into contact with aluminum.     Benadryl [Anti-Itch]      Unknown [No Clinical Screening - See Comments] Rash     Adhesives     REVIEW OF SYSTEMS:  Review Of Systems  Skin: negative for, pigmentation, acne, rash, scaling  Eyes: negative for, visual blurring, double vision, glaucoma, cataracts  Ears/Nose/Throat: negative for, nasal congestion, purulent rhinorrhea, sneezing, postnasal drainage, hearing loss  Respiratory: No shortness of breath, dyspnea on exertion, cough, or hemoptysis  Cardiovascular: negative for, palpitations, tachycardia, irregular heart beat and chest pain  Gastrointestinal: negative for, poor appetite, dysphagia, nausea, vomiting and heartburn  Genitourinary: negative for, nocturia, dysuria, frequency and urgency  Musculoskeletal: negative for, fracture, back pain, neck pain and arthritis  Neurologic: negative for, migraine headaches, syncope, stroke, seizures and paralysis    A comprehensive  "review of systems was performed and found to be negative except as described here or above.  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 mgr.    Tobacco Use     Smoking status: Never Smoker     Smokeless tobacco: Never Used     Tobacco comment: No smokers in home.   Vaping Use     Vaping Use: Never used   Substance and Sexual Activity     Alcohol use: No     Drug use: No     Sexual activity: Never   Other Topics Concern     Parent/sibling w/ CABG, MI or angioplasty before 65F 55M? Not Asked   Social History Narrative     Not on file     Social Determinants of Health     Financial Resource Strain: Not on file   Food Insecurity: Not on file   Transportation Needs: Not on file   Physical Activity: Not on file   Stress: Not on file   Social Connections: Not on file   Intimate Partner Violence: Not on file   Housing Stability: Not on file     FAMILY MEDICAL HISTORY:   Family History   Problem Relation Age of Onset     Coronary Artery Disease No family hx of      Colon Cancer No family hx of      Mental Illness No family hx of      Obesity No family hx of      Osteoporosis No family hx of      Depression No family hx of      Cerebrovascular Disease No family hx of      PHYSICAL EXAM:   /69   Ht 1.778 m (5' 10\")   Wt 115.2 kg (254 lb)   BMI 36.45 kg/m    GENERAL APPEARANCE: alert and no distress  EYES: nonicteric  HENT: mouth without ulcers or lesions  NECK: supple, no adenopathy  RESP: lungs clear to auscultation   CV: regular rhythm, normal rate, no rub  ABDOMEN: soft, nontender, normal bowel sounds, no HSM   Extremities: no clubbing, cyanosis, or edema  MS: no evidence of inflammation in joints, no muscle tenderness  SKIN: no rash  NEURO: mentation intact and speech normal  PSYCH: " affect normal/bright   LABS:   Recent Results (from the past 672 hour(s))   Comprehensive metabolic panel    Collection Time: 07/25/22 12:08 PM   Result Value Ref Range    Sodium 140 133 - 144 mmol/L    Potassium 3.5 3.4 - 5.3 mmol/L    Chloride 109 94 - 109 mmol/L    Carbon Dioxide (CO2) 25 20 - 32 mmol/L    Anion Gap 6 3 - 14 mmol/L    Urea Nitrogen 10 7 - 30 mg/dL    Creatinine 0.67 0.52 - 1.04 mg/dL    Calcium 8.0 (L) 8.5 - 10.1 mg/dL    Glucose 101 (H) 70 - 99 mg/dL    Alkaline Phosphatase 50 40 - 150 U/L    AST 10 0 - 45 U/L    ALT 18 0 - 50 U/L    Protein Total 6.5 (L) 6.8 - 8.8 g/dL    Albumin 3.4 3.4 - 5.0 g/dL    Bilirubin Total 0.4 0.2 - 1.3 mg/dL    GFR Estimate >90 >60 mL/min/1.73m2   Magnesium    Collection Time: 07/25/22 12:08 PM   Result Value Ref Range    Magnesium 2.1 1.6 - 2.3 mg/dL   TSH    Collection Time: 07/25/22 12:08 PM   Result Value Ref Range    TSH 3.06 0.40 - 4.00 mU/L   UA with Microscopic    Collection Time: 07/25/22 12:13 PM   Result Value Ref Range    Color Urine Yellow Colorless, Straw, Light Yellow, Yellow    Appearance Urine Clear Clear    Glucose Urine Negative Negative mg/dL    Bilirubin Urine Negative Negative    Ketones Urine Negative Negative mg/dL    Specific Gravity Urine 1.010 1.003 - 1.035    Blood Urine Large (A) Negative    pH Urine 6.0 5.0 - 7.0    Protein Albumin Urine Negative Negative mg/dL    Urobilinogen Urine Normal Normal, 2.0 mg/dL    Nitrite Urine Negative Negative    Leukocyte Esterase Urine Negative Negative    Bacteria Urine Few (A) None Seen /HPF    Mucus Urine Present (A) None Seen /LPF    RBC Urine 4 (H) <=2 /HPF    WBC Urine 1 <=5 /HPF    Squamous Epithelials Urine 3 (H) <=1 /HPF   Albumin Random Urine Quantitative with Creat Ratio    Collection Time: 07/25/22 12:13 PM   Result Value Ref Range    Creatinine Urine mg/dL 80 mg/dL    Albumin Urine mg/L 8 mg/L    Albumin Urine mg/g Cr 10.00 0.00 - 25.00 mg/g Cr   Protein  random urine    Collection Time:  07/25/22 12:13 PM   Result Value Ref Range    Total Protein Random Urine g/L 0.09 g/L    Total Protein Urine g/gr Creatinine 0.11 0.00 - 0.20 g/g Cr    Creatinine Urine mg/dL 80 mg/dL     CMP  Recent Labs   Lab Test 07/25/22  1208 01/24/21 2111 06/24/20 2020 06/11/18  0650 06/06/18 2125    141 141 142 143   POTASSIUM 3.5 3.4 3.3* 3.6 4.1   CHLORIDE 109 109 109 108 109   CO2 25 27 29 24 25   ANIONGAP 6 5 3 10 9   * 101* 96 127* 92   BUN 10 17 12 10 14   CR 0.67 0.76 0.73 0.84 0.86   GFRESTIMATED >90 >90 >90 78 75   GFRESTBLACK  --  >90 >90 >90 >90   BOBBY 8.0* 8.9 8.4* 8.4* 8.8   MAG 2.1  --   --   --   --    PROTTOTAL 6.5* 7.0 7.3  --  7.2   ALBUMIN 3.4 3.8 3.9  --  3.9   BILITOTAL 0.4 0.2 0.3  --  0.2   ALKPHOS 50 57 61  --  63   AST 10 7 8  --  8   ALT 18 13 18  --  20     CBC  Recent Labs   Lab Test 01/24/21 2111 06/24/20 2020 06/11/18  0650 06/06/18 2125   HGB 13.1 13.7 14.2 13.6   WBC 12.2* 10.0 11.7* 11.4*   RBC 4.53 4.74 4.78 4.72   HCT 38.7 40.8 41.0 41.0   MCV 85 86 86 87   MCH 28.9 28.9 29.7 28.8   MCHC 33.9 33.6 34.6 33.2   RDW 12.8 12.2 13.4 12.9    244 179 237     INRNo lab results found.  ABGNo lab results found.   URINE STUDIES  Recent Labs   Lab Test 07/25/22  1213 06/11/18  0800 06/06/18  2330 07/05/17  1300   COLOR Yellow Yellow Yellow Yellow   APPEARANCE Clear Clear Clear Cloudy   URINEGLC Negative Negative Negative Negative   URINEBILI Negative Negative Negative Negative   URINEKETONE Negative Negative Negative Negative   SG 1.010 1.017 1.020 1.019   UBLD Large* Negative Negative Negative   URINEPH 6.0 7.0 6.0 6.0   PROTEIN Negative Negative Negative Negative   NITRITE Negative Negative Negative Negative   LEUKEST Negative Negative Negative Negative   RBCU 4* 1 <1 1   WBCU 1 1 1 <1     Recent Labs   Lab Test 07/25/22  1213   UTPG 0.11       ASSESSMENT AND PLAN:   #Hypertension: no evidence of renal artery stenosis on recent imaging. Given the positive family history there  could be a genetic component but also obesity and high sodium intake are also contributory. Also the borderline low potassium level would increase her BP and be indicative of hyperaldosteronism. The BP has improved on amlodipine 10 mg daily and losartan 25 mg at bedtime, however it remains sub optimal . Will increase losartan to 50 mg daily and recheck a CMP in 2 weeks. Will also screen her for primary hyperaldosteronism. The patient will also keep a BP diary and communicate numbers via my chart. She was counseled at length about the necessity of lowering her sodium intake and adopting a DASH diet in addition to losing weight and exercise.     #Microscopic hematuria: UA was given in the context of menses, therefore will reorder.       The total time of this encounter amounted to 40 minutes. This time included time spent with the patient, reviewing records, ordering tests, and performing post visit documentation.     The patient will return to follow up in 6 months.      Sri Amaral MD  Division of Renal Disease and Hypertension  August 8, 2022  3:24 PM

## 2022-08-21 ENCOUNTER — HEALTH MAINTENANCE LETTER (OUTPATIENT)
Age: 37
End: 2022-08-21

## 2022-09-12 ENCOUNTER — MYC REFILL (OUTPATIENT)
Dept: NEPHROLOGY | Facility: CLINIC | Age: 37
End: 2022-09-12

## 2022-09-12 DIAGNOSIS — I10 SEVERE HYPERTENSION: ICD-10-CM

## 2022-09-13 RX ORDER — LOSARTAN POTASSIUM 25 MG/1
50 TABLET ORAL DAILY
Qty: 30 TABLET | Refills: 0 | Status: SHIPPED | OUTPATIENT
Start: 2022-09-13 | End: 2022-10-17

## 2022-10-17 ENCOUNTER — MYC REFILL (OUTPATIENT)
Dept: NEPHROLOGY | Facility: CLINIC | Age: 37
End: 2022-10-17

## 2022-10-17 DIAGNOSIS — I10 SEVERE HYPERTENSION: ICD-10-CM

## 2022-10-18 RX ORDER — LOSARTAN POTASSIUM 25 MG/1
50 TABLET ORAL DAILY
Qty: 30 TABLET | Refills: 0 | Status: SHIPPED | OUTPATIENT
Start: 2022-10-18 | End: 2022-12-19

## 2022-12-19 ENCOUNTER — MYC REFILL (OUTPATIENT)
Dept: NEPHROLOGY | Facility: CLINIC | Age: 37
End: 2022-12-19

## 2022-12-19 DIAGNOSIS — I10 SEVERE HYPERTENSION: ICD-10-CM

## 2022-12-20 RX ORDER — LOSARTAN POTASSIUM 25 MG/1
50 TABLET ORAL DAILY
Qty: 30 TABLET | Refills: 0 | Status: SHIPPED | OUTPATIENT
Start: 2022-12-20 | End: 2023-01-25

## 2022-12-26 ENCOUNTER — HEALTH MAINTENANCE LETTER (OUTPATIENT)
Age: 37
End: 2022-12-26

## 2023-01-25 ENCOUNTER — LAB (OUTPATIENT)
Dept: LAB | Facility: CLINIC | Age: 38
End: 2023-01-25
Payer: COMMERCIAL

## 2023-01-25 ENCOUNTER — OFFICE VISIT (OUTPATIENT)
Dept: NEPHROLOGY | Facility: CLINIC | Age: 38
End: 2023-01-25
Attending: INTERNAL MEDICINE
Payer: COMMERCIAL

## 2023-01-25 VITALS
HEART RATE: 68 BPM | SYSTOLIC BLOOD PRESSURE: 139 MMHG | DIASTOLIC BLOOD PRESSURE: 95 MMHG | OXYGEN SATURATION: 97 % | BODY MASS INDEX: 39.87 KG/M2 | TEMPERATURE: 98.2 F | WEIGHT: 277.9 LBS

## 2023-01-25 DIAGNOSIS — I10 SEVERE HYPERTENSION: ICD-10-CM

## 2023-01-25 DIAGNOSIS — I12.9 HYPERTENSION, RENAL: Primary | ICD-10-CM

## 2023-01-25 LAB
ALBUMIN MFR UR ELPH: 27.9 MG/DL (ref 1–14)
ALBUMIN SERPL BCG-MCNC: 4.3 G/DL (ref 3.5–5.2)
ALBUMIN UR-MCNC: 20 MG/DL
ALP SERPL-CCNC: 55 U/L (ref 35–104)
ALT SERPL W P-5'-P-CCNC: 12 U/L (ref 10–35)
ANION GAP SERPL CALCULATED.3IONS-SCNC: 8 MMOL/L (ref 7–15)
APPEARANCE UR: ABNORMAL
AST SERPL W P-5'-P-CCNC: 14 U/L (ref 10–35)
BACTERIA #/AREA URNS HPF: ABNORMAL /HPF
BASOPHILS # BLD AUTO: 0 10E3/UL (ref 0–0.2)
BASOPHILS NFR BLD AUTO: 0 %
BILIRUB SERPL-MCNC: 0.4 MG/DL
BILIRUB UR QL STRIP: NEGATIVE
BUN SERPL-MCNC: 10.1 MG/DL (ref 6–20)
CALCIUM SERPL-MCNC: 8.7 MG/DL (ref 8.6–10)
CHLORIDE SERPL-SCNC: 107 MMOL/L (ref 98–107)
COLOR UR AUTO: YELLOW
CREAT SERPL-MCNC: 0.74 MG/DL (ref 0.51–0.95)
CREAT UR-MCNC: 222 MG/DL
CREAT UR-MCNC: 226 MG/DL
DEPRECATED HCO3 PLAS-SCNC: 27 MMOL/L (ref 22–29)
EOSINOPHIL # BLD AUTO: 0.1 10E3/UL (ref 0–0.7)
EOSINOPHIL NFR BLD AUTO: 2 %
ERYTHROCYTE [DISTWIDTH] IN BLOOD BY AUTOMATED COUNT: 12.7 % (ref 10–15)
GFR SERPL CREATININE-BSD FRML MDRD: >90 ML/MIN/1.73M2
GLUCOSE SERPL-MCNC: 90 MG/DL (ref 70–99)
GLUCOSE UR STRIP-MCNC: NEGATIVE MG/DL
HCT VFR BLD AUTO: 39.3 % (ref 35–47)
HGB BLD-MCNC: 12.9 G/DL (ref 11.7–15.7)
HGB UR QL STRIP: NEGATIVE
IMM GRANULOCYTES # BLD: 0 10E3/UL
IMM GRANULOCYTES NFR BLD: 0 %
KETONES UR STRIP-MCNC: NEGATIVE MG/DL
LEUKOCYTE ESTERASE UR QL STRIP: NEGATIVE
LYMPHOCYTES # BLD AUTO: 3.1 10E3/UL (ref 0.8–5.3)
LYMPHOCYTES NFR BLD AUTO: 37 %
MCH RBC QN AUTO: 28.2 PG (ref 26.5–33)
MCHC RBC AUTO-ENTMCNC: 32.8 G/DL (ref 31.5–36.5)
MCV RBC AUTO: 86 FL (ref 78–100)
MICROALBUMIN UR-MCNC: 15.3 MG/L
MICROALBUMIN/CREAT UR: 6.89 MG/G CR (ref 0–25)
MONOCYTES # BLD AUTO: 0.7 10E3/UL (ref 0–1.3)
MONOCYTES NFR BLD AUTO: 8 %
MUCOUS THREADS #/AREA URNS LPF: PRESENT /LPF
NEUTROPHILS # BLD AUTO: 4.4 10E3/UL (ref 1.6–8.3)
NEUTROPHILS NFR BLD AUTO: 53 %
NITRATE UR QL: NEGATIVE
NRBC # BLD AUTO: 0 10E3/UL
NRBC BLD AUTO-RTO: 0 /100
PH UR STRIP: 5.5 [PH] (ref 5–7)
PLATELET # BLD AUTO: 208 10E3/UL (ref 150–450)
POTASSIUM SERPL-SCNC: 3.9 MMOL/L (ref 3.4–5.3)
PROT SERPL-MCNC: 6.8 G/DL (ref 6.4–8.3)
PROT/CREAT 24H UR: 0.12 MG/MG CR (ref 0–0.2)
RBC # BLD AUTO: 4.57 10E6/UL (ref 3.8–5.2)
RBC URINE: 9 /HPF
SODIUM SERPL-SCNC: 142 MMOL/L (ref 136–145)
SP GR UR STRIP: 1.03 (ref 1–1.03)
SQUAMOUS EPITHELIAL: 6 /HPF
TSH SERPL DL<=0.005 MIU/L-ACNC: 4 UIU/ML (ref 0.3–4.2)
UROBILINOGEN UR STRIP-MCNC: NORMAL MG/DL
WBC # BLD AUTO: 8.3 10E3/UL (ref 4–11)
WBC URINE: 2 /HPF

## 2023-01-25 PROCEDURE — 82570 ASSAY OF URINE CREATININE: CPT | Performed by: INTERNAL MEDICINE

## 2023-01-25 PROCEDURE — 80050 GENERAL HEALTH PANEL: CPT | Performed by: PATHOLOGY

## 2023-01-25 PROCEDURE — 84156 ASSAY OF PROTEIN URINE: CPT | Performed by: INTERNAL MEDICINE

## 2023-01-25 PROCEDURE — 99214 OFFICE O/P EST MOD 30 MIN: CPT | Mod: GC | Performed by: INTERNAL MEDICINE

## 2023-01-25 PROCEDURE — 81001 URINALYSIS AUTO W/SCOPE: CPT | Performed by: PATHOLOGY

## 2023-01-25 PROCEDURE — 36415 COLL VENOUS BLD VENIPUNCTURE: CPT | Performed by: PATHOLOGY

## 2023-01-25 PROCEDURE — G0463 HOSPITAL OUTPT CLINIC VISIT: HCPCS | Performed by: INTERNAL MEDICINE

## 2023-01-25 RX ORDER — TELMISARTAN 80 MG/1
80 TABLET ORAL DAILY
Qty: 30 TABLET | Refills: 3 | Status: SHIPPED | OUTPATIENT
Start: 2023-01-25 | End: 2023-07-17

## 2023-01-25 RX ORDER — METOPROLOL TARTRATE 25 MG/1
25 TABLET, FILM COATED ORAL 2 TIMES DAILY
COMMUNITY
Start: 2023-01-16 | End: 2023-01-25

## 2023-01-25 RX ORDER — AMLODIPINE BESYLATE 5 MG/1
5 TABLET ORAL DAILY
Qty: 30 TABLET | Refills: 3 | Status: SHIPPED | OUTPATIENT
Start: 2023-01-25 | End: 2023-07-17

## 2023-01-25 ASSESSMENT — PAIN SCALES - GENERAL: PAINLEVEL: NO PAIN (0)

## 2023-01-25 NOTE — PROGRESS NOTES
Nephrology Clinic Note  January 25, 2023    CC: Management of HTN due to AISSATOU, S/P midrenal artery angioplasty due to FMD  In 7/17/ 2008    BP: 139/95 to 155/106 today    HPI: Leah Holloway is a 37 year old female who presents for follow up for her uncontrolled hypertension.  The patient has a history of hypertension since at least 2008 thought to be secondary to right renal artery stenosis for which she underwent right midrenal artery 5 mm angioplasty for stenosis related to fibromuscular  dysplasia in the [facility] on 7/17/2008. She also has a h/o morbid obesity (BMI around 38) and seasonal allergies.  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  Proximal superior aneurysm measures 7.8 mm in diameter. Artery between  the aneurysms measures 4.3 mm in diameter. Inferior more distal  aneurysm measures 8.6 mm in diameter. In 2018, the proximal aneurysm  measured 7.8 mm and the distal aneurysm measured 8.4 mm.      Two right and single left renal arteries. Superior right renal artery is the main renal artery. Smaller inferior right renal artery originates anteriorly from the aorta 16 mm inferior to the main superior right renal artery and supplies a portion of the ventral lower pole.     No renal artery stenosis or dissection demonstrated. No renal artery arterial beading suggested.     Patent aorta without aneurysm, dissection, or stenosis. Celiac,  superior mesenteric, and inferior mesenteric arteries are patent  without stenosis, beading, or aneurysm. Bilateral common, internal,  and external iliac arteries patent. Bilateral corona mortis. Bilateral  common femoral arteries patent.     Her maternal grandmother has hypertension and her father started having hypertension at the age of 55.     Her renal function is intact however she had low potassium levels in the  past.  Urine albumin protein is 20 and total protein in urine is 27.9 today  She mentioned that she was recently admitted in the hospital for hypertensive urgency and hyperkalemia. She is now taking Losartan 100 mg , metoprolol 25 mg BD since lottie 15 this year. She was off amlod since August last year because of swelling in her legs.   She mentioned that she feels fatigue and weak after starting metoprolol. She measures her BP at home and her SBP is around 130-160 since 10 days.     Mentioned that she is buzy and its hard for her to eat balanced diet and eats take out often.         Allergies   Allergen Reactions     Aluminum Hives     Reaction when pt comes into contact with aluminum.     Benadryl [Anti-Itch]      Unknown [No Clinical Screening - See Comments] Rash     Adhesives       BP MONITOR-STETHOSCOPE KIT, test as directed  FLONASE 50 MCG/ACT NA SUSP, USE 2 SPRAYS IN EACH NOSTRIL ONCE DAILY  losartan (COZAAR) 25 MG tablet, Take 2 tablets (50 mg) by mouth daily (Patient taking differently: Take 100 mg by mouth daily)  metoprolol tartrate (LOPRESSOR) 25 MG tablet, 25 mg 2 times daily  amLODIPine (NORVASC) 10 MG tablet, Take 1 tablet (10 mg) by mouth daily (Patient not taking: Reported on 1/25/2023)  losartan (COZAAR) 25 MG tablet, Take 2 tablets (50 mg) by mouth daily (Patient not taking: Reported on 1/25/2023)    No current facility-administered medications on file prior to visit.      No past medical history on file.    Past Surgical History:   Procedure Laterality Date     ARTHROSCOPY KNEE Right 1/22/2016    Procedure: ARTHROSCOPY KNEE;  Surgeon: Ian Jaimes MD;  Location: [facility] OR     GENITOURINARY SURGERY       HC KNEE SCOPE,PART SYNOVECT Right 1/22/16    Medial plica excision - WORK COMP       Social History     Tobacco Use     Smoking status: Never     Smokeless tobacco: Never     Tobacco comments:     No smokers in home.   Vaping Use     Vaping Use: Never used   Substance Use Topics     Alcohol  use: No     Drug use: No       Family History   Problem Relation Age of Onset     Coronary Artery Disease No family hx of      Colon Cancer No family hx of      Mental Illness No family hx of      Obesity No family hx of      Osteoporosis No family hx of      Depression No family hx of      Cerebrovascular Disease No family hx of        ROS: A 12 system review of systems was negative other than noted here or above.     Exam:  BP (!) 139/95   Pulse 68   Temp 98.2  F (36.8  C) (Oral)   Wt 126.1 kg (277 lb 14.4 oz)   SpO2 97%   BMI 39.87 kg/m      GENERAL APPEARANCE: alert and no distress  EYES: PERRL, no scleral icterus  HENT: mouth without ulcers or lesions  NECK: supple, no adenopathy  RESP: lungs clear to auscultation   CV: regular rhythm, normal rate, no rub  Extremities: no clubbing, cyanosis, or edema  SKIN: no rash  NEURO: mentation intact and speech normal  PSYCH: affect normal/bright      Results:    Lab on 01/25/2023   Component Date Value Ref Range Status     WBC Count 01/25/2023 8.3  4.0 - 11.0 10e3/uL Final     RBC Count 01/25/2023 4.57  3.80 - 5.20 10e6/uL Final     Hemoglobin 01/25/2023 12.9  11.7 - 15.7 g/dL Final     Hematocrit 01/25/2023 39.3  35.0 - 47.0 % Final     MCV 01/25/2023 86  78 - 100 fL Final     MCH 01/25/2023 28.2  26.5 - 33.0 pg Final     MCHC 01/25/2023 32.8  31.5 - 36.5 g/dL Final     RDW 01/25/2023 12.7  10.0 - 15.0 % Final     Platelet Count 01/25/2023 208  150 - 450 10e3/uL Final     % Neutrophils 01/25/2023 53  % Final     % Lymphocytes 01/25/2023 37  % Final     % Monocytes 01/25/2023 8  % Final     % Eosinophils 01/25/2023 2  % Final     % Basophils 01/25/2023 0  % Final     % Immature Granulocytes 01/25/2023 0  % Final     NRBCs per 100 WBC 01/25/2023 0  <1 /100 Final     Absolute Neutrophils 01/25/2023 4.4  1.6 - 8.3 10e3/uL Final     Absolute Lymphocytes 01/25/2023 3.1  0.8 - 5.3 10e3/uL Final     Absolute Monocytes 01/25/2023 0.7  0.0 - 1.3 10e3/uL Final     Absolute  Eosinophils 01/25/2023 0.1  0.0 - 0.7 10e3/uL Final     Absolute Basophils 01/25/2023 0.0  0.0 - 0.2 10e3/uL Final     Absolute Immature Granulocytes 01/25/2023 0.0  <=0.4 10e3/uL Final     Absolute NRBCs 01/25/2023 0.0  10e3/uL Final       Assessment/Plan:   Leah Holloway is a 37 year old female who presents for follow up for her uncontrolled hypertension.    #Hypertension: no evidence of renal artery stenosis on recent imaging. Given the positive family history there could be a genetic component but also obesity and high sodium intake are also contributory. Also the borderline low potassium level would increase her BP and be indicative of hyperaldosteronism. Will also screen her for primary hyperaldosteronism.  - BP diary and communicate numbers via my chart.  - Decrease sodium diet and processed food  - Exercise and lose weight   - Add Amlod 5mg and change losartan to telmisartan 80 mg for better control of BP  - Aldosterone - Renin ratio  - Labs in 2 weeks  - Follow up in 4-6 weeks.      Krissy Redding MD  Internal Medicine PGY1  Patient was seen by Sri Amaral MD  Dept of Nephrology and Hypertension  [facility]

## 2023-01-25 NOTE — PATIENT INSTRUCTIONS
It was a pleasure taking care of you today.  I've included a brief summary of our discussion and care plan from today's visit below.  Please review this information with your primary care provider.  _______________________________________________________________________    My recommendations are summarized as follows:  -Keep the amount of sodium in your diet at 2.4 g/day (also see instructions attached in that regard)  -Keep a Blood Pressure diary by taking your blood pressure twice a day as instructed (also see instructions attached in that regard). Please make sure that you are using a validated blood pressure device by checking that it is the case at: https://www.validatebp.org/  -Avoid all NSAID's. Examples include Ibuprofen (Advil, Motrin), naprosyn (Aleve), celebrex among others. Acetaminophen (Tylenol) is ok with maximum dose in 24 hours of 3200 mg.  -Healthy lifestyle measures will keep your kidney's functioning at their current best. This includes regular exercise, weight loss and smoking cessation if you smoke.   -Do not exceed one alcoholic drink per day  -Stop losartan and replace it with temisartan 80 mg once daily  -Stop metoprolol and take instead amlodipine 5 mg daily  -If for any reason, you are at risk of volume depletion/dehydration (high grade fevers, severe vomiting or diarrhea) stop telmisartan till you get better  -Do labs in 2 weeks from now      To schedule imaging please call [phone]     To schedule your lab appointment at the [facility], please call       Return to Nephrology Clinic in 4-6 weeks to review your progress.    _______________________________________________________________________    Who do I call with any questions after my visit?    Please be in touch if there are any further questions that arise following today's visit.  There are multiple ways to contact your nephrology care team.      During business hours, you may reach your Nephrology  LPN Care Coordinator, Cate, at .      To schedule or reschedule an appointment, please call [phone].    You can always send a secure message through [employer].  [employer] messages are answered by your nurse or doctor typically within 24 hours.  Please allow extra time on weekends and holidays.      For urgent/emergent questions after business hours, you may reach the on-call Nephrology Fellow by contacting the [facility]  at [phone].     How will I get the results of any tests ordered?    You will receive all of your results.  If you have signed up for [employer], any tests ordered at your visit will be available to you after your physician reviews them.  Typically this takes 1-2 weeks.  If there are urgent results that require a change in your care plan, your physician or nurse will call you to discuss the next steps.      What is [employer]?  [employer] is a secure way for you to access all of your healthcare records from the [facility].  It is a web based computer program, so you can sign on to it from any location.  It also allows you to send secure messages to your care team.  I recommend signing up for [employer] access if you have not already done so and are comfortable with using a computer.      How do I schedule a follow-up visit?  If you did not schedule a follow-up visit today, please call [phone] to schedule a follow-up office visit.        Sincerely,      Dr. Sri Amaral  [facility]  Division of Nephrology and Hypertension

## 2023-01-25 NOTE — LETTER
1/25/2023       RE: Leah Holloway  [address]  [facility] [address]     Dear Colleague,    Thank you for referring your patient, Leah Holloway, to the [facility] [place] at [facility]. Please see a copy of my visit note below.      Nephrology Clinic Note  January 25, 2023    CC: Management of HTN due to AISSATOU, S/P midrenal artery angioplasty due to FMD  In 7/17/ 2008    BP: 139/95 to 155/106 today    HPI: Leah Holloway is a 37 year old female who presents for follow up for her uncontrolled hypertension.  The patient has a history of hypertension since at least 2008 thought to be secondary to right renal artery stenosis for which she underwent right midrenal artery 5 mm angioplasty for stenosis related to fibromuscular  dysplasia in the [facility] on 7/17/2008. She also has a h/o morbid obesity (BMI around 38) and seasonal allergies.  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  Proximal superior aneurysm measures 7.8 mm in diameter. Artery between  the aneurysms measures 4.3 mm in diameter. Inferior more distal  aneurysm measures 8.6 mm in diameter. In 2018, the proximal aneurysm  measured 7.8 mm and the distal aneurysm measured 8.4 mm.      Two right and single left renal arteries. Superior right renal artery is the main renal artery. Smaller inferior right renal artery originates anteriorly from the aorta 16 mm inferior to the main superior right renal artery and supplies a portion of the ventral lower pole.     No renal artery stenosis or dissection demonstrated. No renal artery arterial beading suggested.     Patent aorta without aneurysm, dissection, or stenosis. Celiac,  superior mesenteric, and inferior mesenteric arteries are patent  without stenosis, beading, or aneurysm. Bilateral  common, internal,  and external iliac arteries patent. Bilateral corona mortis. Bilateral  common femoral arteries patent.     Her maternal grandmother has hypertension and her father started having hypertension at the age of 55.     Her renal function is intact however she had low potassium levels in the past.  Urine albumin protein is 20 and total protein in urine is 27.9 today  She mentioned that she was recently admitted in the hospital for hypertensive urgency and hyperkalemia. She is now taking Losartan 100 mg , metoprolol 25 mg BD since lottie 15 this year. She was off amlod since August last year because of swelling in her legs.   She mentioned that she feels fatigue and weak after starting metoprolol. She measures her BP at home and her SBP is around 130-160 since 10 days.     Mentioned that she is buzy and its hard for her to eat balanced diet and eats take out often.         Allergies   Allergen Reactions     Aluminum Hives     Reaction when pt comes into contact with aluminum.     Benadryl [Anti-Itch]      Unknown [No Clinical Screening - See Comments] Rash     Adhesives       BP MONITOR-STETHOSCOPE KIT, test as directed  FLONASE 50 MCG/ACT NA SUSP, USE 2 SPRAYS IN EACH NOSTRIL ONCE DAILY  losartan (COZAAR) 25 MG tablet, Take 2 tablets (50 mg) by mouth daily (Patient taking differently: Take 100 mg by mouth daily)  metoprolol tartrate (LOPRESSOR) 25 MG tablet, 25 mg 2 times daily  amLODIPine (NORVASC) 10 MG tablet, Take 1 tablet (10 mg) by mouth daily (Patient not taking: Reported on 1/25/2023)  losartan (COZAAR) 25 MG tablet, Take 2 tablets (50 mg) by mouth daily (Patient not taking: Reported on 1/25/2023)    No current facility-administered medications on file prior to visit.      No past medical history on file.    Past Surgical History:   Procedure Laterality Date     ARTHROSCOPY KNEE Right 1/22/2016    Procedure: ARTHROSCOPY KNEE;  Surgeon: Ian Jaimes MD;  Location: [facility] OR     GENITOURINARY  SURGERY       HC KNEE SCOPE,PART SYNOVECT Right 1/22/16    Medial plica excision - WORK COMP       Social History     Tobacco Use     Smoking status: Never     Smokeless tobacco: Never     Tobacco comments:     No smokers in home.   Vaping Use     Vaping Use: Never used   Substance Use Topics     Alcohol use: No     Drug use: No       Family History   Problem Relation Age of Onset     Coronary Artery Disease No family hx of      Colon Cancer No family hx of      Mental Illness No family hx of      Obesity No family hx of      Osteoporosis No family hx of      Depression No family hx of      Cerebrovascular Disease No family hx of        ROS: A 12 system review of systems was negative other than noted here or above.     Exam:  BP (!) 139/95   Pulse 68   Temp 98.2  F (36.8  C) (Oral)   Wt 126.1 kg (277 lb 14.4 oz)   SpO2 97%   BMI 39.87 kg/m      GENERAL APPEARANCE: alert and no distress  EYES: PERRL, no scleral icterus  HENT: mouth without ulcers or lesions  NECK: supple, no adenopathy  RESP: lungs clear to auscultation   CV: regular rhythm, normal rate, no rub  Extremities: no clubbing, cyanosis, or edema  SKIN: no rash  NEURO: mentation intact and speech normal  PSYCH: affect normal/bright      Results:    Lab on 01/25/2023   Component Date Value Ref Range Status     WBC Count 01/25/2023 8.3  4.0 - 11.0 10e3/uL Final     RBC Count 01/25/2023 4.57  3.80 - 5.20 10e6/uL Final     Hemoglobin 01/25/2023 12.9  11.7 - 15.7 g/dL Final     Hematocrit 01/25/2023 39.3  35.0 - 47.0 % Final     MCV 01/25/2023 86  78 - 100 fL Final     MCH 01/25/2023 28.2  26.5 - 33.0 pg Final     MCHC 01/25/2023 32.8  31.5 - 36.5 g/dL Final     RDW 01/25/2023 12.7  10.0 - 15.0 % Final     Platelet Count 01/25/2023 208  150 - 450 10e3/uL Final     % Neutrophils 01/25/2023 53  % Final     % Lymphocytes 01/25/2023 37  % Final     % Monocytes 01/25/2023 8  % Final     % Eosinophils 01/25/2023 2  % Final     % Basophils 01/25/2023 0  % Final      % Immature Granulocytes 01/25/2023 0  % Final     NRBCs per 100 WBC 01/25/2023 0  <1 /100 Final     Absolute Neutrophils 01/25/2023 4.4  1.6 - 8.3 10e3/uL Final     Absolute Lymphocytes 01/25/2023 3.1  0.8 - 5.3 10e3/uL Final     Absolute Monocytes 01/25/2023 0.7  0.0 - 1.3 10e3/uL Final     Absolute Eosinophils 01/25/2023 0.1  0.0 - 0.7 10e3/uL Final     Absolute Basophils 01/25/2023 0.0  0.0 - 0.2 10e3/uL Final     Absolute Immature Granulocytes 01/25/2023 0.0  <=0.4 10e3/uL Final     Absolute NRBCs 01/25/2023 0.0  10e3/uL Final       Assessment/Plan:   Leah Holloway is a 37 year old female who presents for follow up for her uncontrolled hypertension.    #Hypertension: no evidence of renal artery stenosis on recent imaging. Given the positive family history there could be a genetic component but also obesity and high sodium intake are also contributory. Also the borderline low potassium level would increase her BP and be indicative of hyperaldosteronism. Will also screen her for primary hyperaldosteronism.  - BP diary and communicate numbers via my chart.  - Decrease sodium diet and processed food  - Exercise and lose weight   - Add Amlod 5mg and change losartan to telmisartan 80 mg for better control of BP  - Aldosterone - Renin ratio  - Labs in 2 weeks  - Follow up in 4-6 weeks.      Krissy Redding MD  Internal Medicine PGY1  Patient was seen by Sri Amaral MD  Dept of Nephrology and Hypertension  [facility]    Attestation signed by Sri Amaral MD at 1/26/2023  5:20 PM:  Physician Attestation   I, Sri Amaral, saw and evaluated Leah Holloway with the Resident Dr Redding. I have reviewed and discussed with the Resident their history, physical and plan.    I personally reviewed the vital signs, medications, labs, and imaging.    In brief, uncontrolled HTN in the setting of known history of treated fibromuscular dysplasia and obesity, Switch losartan to telmisartan and metoprolol to  amlodipine.    Rest per the resident's note.     Total time spent 30 minutes on the date of encounter for chart review, history taking, physical exam, labs and notes reviewed, advised and coordinating care.             Again, thank you for allowing me to participate in the care of your patient.      Sincerely,    Sri Amaral MD

## 2023-01-25 NOTE — NURSING NOTE
Chief Complaint   Patient presents with     RECHECK       BP (!) 139/95   Pulse 68   Temp 98.2  F (36.8  C) (Oral)   Wt 126.1 kg (277 lb 14.4 oz)   SpO2 97%   BMI 39.87 kg/m      Daren Wesley on 1/25/2023 at 3:18 PM

## 2023-02-08 ENCOUNTER — LAB (OUTPATIENT)
Dept: LAB | Facility: CLINIC | Age: 38
End: 2023-02-08
Payer: COMMERCIAL

## 2023-02-08 DIAGNOSIS — I12.9 HYPERTENSION, RENAL: ICD-10-CM

## 2023-02-08 DIAGNOSIS — I10 SEVERE HYPERTENSION: ICD-10-CM

## 2023-02-08 LAB
ALBUMIN SERPL BCG-MCNC: 4.2 G/DL (ref 3.5–5.2)
ALP SERPL-CCNC: 53 U/L (ref 35–104)
ALT SERPL W P-5'-P-CCNC: 13 U/L (ref 10–35)
ANION GAP SERPL CALCULATED.3IONS-SCNC: 9 MMOL/L (ref 7–15)
AST SERPL W P-5'-P-CCNC: 14 U/L (ref 10–35)
BILIRUB SERPL-MCNC: 0.5 MG/DL
BUN SERPL-MCNC: 12.1 MG/DL (ref 6–20)
CALCIUM SERPL-MCNC: 9.1 MG/DL (ref 8.6–10)
CHLORIDE SERPL-SCNC: 104 MMOL/L (ref 98–107)
CREAT SERPL-MCNC: 0.68 MG/DL (ref 0.51–0.95)
DEPRECATED HCO3 PLAS-SCNC: 26 MMOL/L (ref 22–29)
GFR SERPL CREATININE-BSD FRML MDRD: >90 ML/MIN/1.73M2
GLUCOSE SERPL-MCNC: 104 MG/DL (ref 70–99)
POTASSIUM SERPL-SCNC: 4.2 MMOL/L (ref 3.4–5.3)
PROT SERPL-MCNC: 6.8 G/DL (ref 6.4–8.3)
SODIUM SERPL-SCNC: 139 MMOL/L (ref 136–145)

## 2023-02-08 PROCEDURE — 82088 ASSAY OF ALDOSTERONE: CPT

## 2023-02-08 PROCEDURE — 84244 ASSAY OF RENIN: CPT | Mod: 90

## 2023-02-08 PROCEDURE — 99000 SPECIMEN HANDLING OFFICE-LAB: CPT

## 2023-02-08 PROCEDURE — 36415 COLL VENOUS BLD VENIPUNCTURE: CPT

## 2023-02-08 PROCEDURE — 80053 COMPREHEN METABOLIC PANEL: CPT

## 2023-02-09 LAB — ALDOST SERPL-MCNC: 17.4 NG/DL (ref 0–31)

## 2023-02-13 ENCOUNTER — TELEPHONE (OUTPATIENT)
Dept: NEPHROLOGY | Facility: CLINIC | Age: 38
End: 2023-02-13
Payer: COMMERCIAL

## 2023-02-13 LAB
ALDOST/RENIN PLAS-RTO: 13.4 {RATIO} (ref 0–25)
RENIN PLAS-CCNC: 1.3 NG/ML/HR

## 2023-02-13 NOTE — TELEPHONE ENCOUNTER
[facility] Call Center    Phone Message    May a detailed message be left on voicemail: yes     Reason for Call: The patinet called to schedule 4-6 week follow up between 2/22/23 and 3/8/23. Writer not able to find any availability in [place] or [place]. Writer offered first available but patient declined to schedule and would like a call from care team when she is able to be scheduled. Please review and advise. Thank you.    Action Taken: Message routed to:  [facility] Nephrology    Travel Screening: Not Applicable

## 2023-03-01 ENCOUNTER — APPOINTMENT (OUTPATIENT)
Dept: GENERAL RADIOLOGY | Facility: CLINIC | Age: 38
End: 2023-03-01
Attending: NURSE PRACTITIONER

## 2023-03-01 ENCOUNTER — HOSPITAL ENCOUNTER (EMERGENCY)
Facility: CLINIC | Age: 38
Discharge: HOME | End: 2023-03-01
Attending: NURSE PRACTITIONER | Admitting: NURSE PRACTITIONER

## 2023-03-01 VITALS
RESPIRATION RATE: 16 BRPM | WEIGHT: 270 LBS | OXYGEN SATURATION: 99 % | SYSTOLIC BLOOD PRESSURE: 151 MMHG | HEART RATE: 74 BPM | TEMPERATURE: 98.9 F | DIASTOLIC BLOOD PRESSURE: 104 MMHG | BODY MASS INDEX: 38.74 KG/M2

## 2023-03-01 DIAGNOSIS — S92.425A CLOSED NONDISPLACED FRACTURE OF DISTAL PHALANX OF LEFT GREAT TOE, INITIAL ENCOUNTER: ICD-10-CM

## 2023-03-01 PROCEDURE — 28490 TREAT BIG TOE FRACTURE: CPT | Mod: LT | Performed by: NURSE PRACTITIONER

## 2023-03-01 PROCEDURE — 28490 TREAT BIG TOE FRACTURE: CPT | Mod: 54 | Performed by: NURSE PRACTITIONER

## 2023-03-01 PROCEDURE — 73630 X-RAY EXAM OF FOOT: CPT | Mod: LT

## 2023-03-01 PROCEDURE — 99284 EMERGENCY DEPT VISIT MOD MDM: CPT | Mod: 25 | Performed by: NURSE PRACTITIONER

## 2023-03-01 NOTE — DISCHARGE INSTRUCTIONS
Wear postop shoe is much as possible for the next 4 weeks..  Elevate the foot, intermittent ice packs for swelling as needed.  Tylenol 650 mg every 4-6 hours as needed for pain.  Ibuprofen 400-600 mg every 6-8 hours as needed for pain  (take with food, stop if causing stomach pains.)

## 2023-03-01 NOTE — ED TRIAGE NOTES
Patient dropped a 20lb box of metal signs on her foot 2 days ago, c/o increased pain to L great toe.      Triage Assessment     Row Name 03/01/23 1203       Triage Assessment (Adult)    Airway WDL WDL       Respiratory WDL    Respiratory WDL WDL       Cardiac WDL    Cardiac WDL WDL       Peripheral/Neurovascular WDL    Peripheral Neurovascular WDL WDL

## 2023-03-01 NOTE — LETTER
March 1, 2023      To Whom It May Concern:      Leah Holloway was seen in our Emergency Department today, 03/01/23.    Patient has a work-related injury: left great toe fracture.    Work-restrictions:  --wear post-op shoe for the next 4 weeks.      Sincerely,        LUANN Saldana CNP

## 2023-03-01 NOTE — ED PROVIDER NOTES
History     Chief Complaint   Patient presents with     Toe Injury     HPI  Leah Holloway is a 37 year old female who presents for evaluation of a work-related injury.  Patient dropped a box of metal signs onto her left foot/great toe.  She reports worsening pain.  Bruising and swelling left great toe.    Allergies:  Allergies   Allergen Reactions     Aluminum Hives     Reaction when pt comes into contact with aluminum.     Benadryl [Anti-Itch]      Unknown [No Clinical Screening - See Comments] Rash     Adhesives       Problem List:    Patient Active Problem List    Diagnosis Date Noted     Morbid obesity (H) 07/06/2022     Priority: Medium     Plica of knee, right 01/28/2016     Priority: Medium     Renal artery stenosis (H) 01/13/2016     Priority: Medium     Surgery in 2009       Chondromalacia patellae, right 09/10/2015     Priority: Medium     Synovitis of knee 08/13/2015     Priority: Medium     CARDIOVASCULAR SCREENING; LDL GOAL LESS THAN 160 10/31/2010     Priority: Medium     GERD (gastroesophageal reflux disease) 05/28/2008     Priority: Medium     Elevated blood pressure reading without diagnosis of hypertension 03/19/2008     Priority: Medium     Headache 03/19/2008     Priority: Medium     Problem list name updated by automated process. Provider to review       Syncope and collapse 03/19/2008     Priority: Medium        Past Medical History:    History reviewed. No pertinent past medical history.    Past Surgical History:    Past Surgical History:   Procedure Laterality Date     ARTHROSCOPY KNEE Right 1/22/2016    Procedure: ARTHROSCOPY KNEE;  Surgeon: Ian Jaimes MD;  Location: [facility] OR     GENITOURINARY SURGERY       HC KNEE SCOPE,PART SYNOVECT Right 1/22/16    Medial plica excision - WORK COMP       Family History:    Family History   Problem Relation Age of Onset     Coronary Artery Disease No family hx of      Colon Cancer No family hx of      Mental Illness No family hx of       Obesity No family hx of      Osteoporosis No family hx of      Depression No family hx of      Cerebrovascular Disease No family hx of        Social History:  Marital Status:  Single [1]  Social History     Tobacco Use     Smoking status: Never     Smokeless tobacco: Never     Tobacco comments:     No smokers in home.   Vaping Use     Vaping Use: Never used   Substance Use Topics     Alcohol use: No     Drug use: No        Medications:    amLODIPine (NORVASC) 5 MG tablet  BP MONITOR-STETHOSCOPE KIT  FLONASE 50 MCG/ACT NA SUSP  telmisartan (MICARDIS) 80 MG tablet          Review of Systems  As mentioned above in the history present illness. All other systems were reviewed and are negative.    Physical Exam   BP: (!) 151/104  Pulse: 74  Temp: 98.9  F (37.2  C)  Resp: 16  Weight: 122.5 kg (270 lb)  SpO2: 99 %      Physical Exam  Left foot:  Ecchymosis, swelling, and tenderness of the great toe.  Remainder of the toes and foot are nontender.  Foot is warm and pink.  Neurovascularly intact.  ED Course                 Procedures       Study Result    Narrative & Impression   FOOT LEFT THREE OR MORE VIEWS March 1, 2023 12:14 PM      HISTORY: Dropped heavy item on foot. Pain and swelling.     COMPARISON: Left ankle x-rays dated 5/19/2018.      FINDINGS: Subtle linear lucency is seen along the lateral base  (extending to the articular surface) of the distal phalanx of the  great toe on the PA view only. This likely represents an artifact but  could represent also a very subtle, not significantly displaced  fracture. No other evidence for fracture or malalignment.  Enthesopathic spurring of the plantar aponeurosis origin at and of the  Achilles tendon insertion into the calcaneus are again noted, very  slightly progressed since the prior study from 2018.                                                                      IMPRESSION:   1. Question subtle not significantly displaced intra-articular  fracture of the lateral  base of the distal phalanx of the.  Or malalignment is  identified.  2. Enthesopathic changes of the calcaneus are again noted, minimally  worsened as compared to the prior study.   This result has not been signed. Information might be incomplete.         Assessments & Plan (with Medical Decision Making)     I discussed the imaging findings with patient.  She is tender to the left great toe and the subtle lucency noted on the x-ray is likely related to a nondisplaced distal left great toe fracture.  Patient was fitted with a postop shoe.    Plan as follows:    Wear postop shoe is much as possible for the next 4 weeks..  Elevate the foot, intermittent ice packs for swelling as needed.  Tylenol 650 mg every 4-6 hours as needed for pain.  Ibuprofen 400-600 mg every 6-8 hours as needed for pain  (take with food, stop if causing stomach pains.)        Discharge Medication List as of 3/1/2023  1:53 PM          Final diagnoses:   Closed nondisplaced fracture of distal phalanx of left great toe, initial encounter       3/1/2023   [facility] EMERGENCY DEPT     Leslie, LUANN Garcia CNP  03/01/23 1528

## 2023-07-10 ENCOUNTER — VIRTUAL VISIT (OUTPATIENT)
Dept: FAMILY MEDICINE | Facility: OTHER | Age: 38
End: 2023-07-10
Payer: COMMERCIAL

## 2023-07-10 DIAGNOSIS — R06.02 SOB (SHORTNESS OF BREATH): ICD-10-CM

## 2023-07-10 DIAGNOSIS — R53.83 FATIGUE, UNSPECIFIED TYPE: ICD-10-CM

## 2023-07-10 DIAGNOSIS — I16.0 HYPERTENSIVE URGENCY: Primary | ICD-10-CM

## 2023-07-10 PROCEDURE — 99207 PR NO CHARGE LOS: CPT | Mod: VID | Performed by: PHYSICIAN ASSISTANT

## 2023-07-10 RX ORDER — LOSARTAN POTASSIUM 100 MG/1
1 TABLET ORAL
COMMUNITY
Start: 2023-01-16 | End: 2023-10-17

## 2023-07-10 ASSESSMENT — ENCOUNTER SYMPTOMS: HEADACHES: 1

## 2023-07-10 ASSESSMENT — ASTHMA QUESTIONNAIRES: ACT_TOTALSCORE: 12

## 2023-07-10 NOTE — PROGRESS NOTES
"Shawnee is a 37 year old who is being evaluated via a billable video visit.      How would you like to obtain your AVS? MyChart  If the video visit is dropped, the invitation should be resent by: Text to cell phone: [phone]  Will anyone else be joining your video visit? No    Assessment & Plan     1. Hypertensive urgency    2. SOB (shortness of breath)    3. Fatigue, unspecified type        See HPI. Patient's concerns exceed what can be safely managed through virtual visit. I have instructed her to be seen at local urgent care. No charge applied to this visit.       Jan Bear PA-C  [facility]    Thomas Mallory is a 37 year old, presenting for the following health issues:  Hypertension and Headache        7/10/2023     1:54 PM   Additional Questions   Roomed by Vincent YUAN   Accompanied by Self         7/10/2023     1:54 PM   Patient Reported Additional Medications   Patient reports taking the following new medications None     Headache     History of Present Illness       Hypertension: She presents for follow up of hypertension.  She does check blood pressure  regularly outside of the clinic. Outside blood pressures have been over 140/90. She does not follow a low salt diet.     Headaches:   Since the patient's last clinic visit, headaches are: worsened  The patient is getting headaches:  Every day for the last 2 weeks  She is able to do normal daily activities when she has a migraine.  The patient is taking the following rescue/relief medications:  Ibuprofen (Advil, Motrin) and Excedrin   Patient states \"I get some relief\" from the rescue/relief medications.   The patient is taking the following medications to prevent migraines:  No medications to prevent migraines  In the past 4 weeks, the patient has gone to an Urgent Care or Emergency Room 0 times times due to headaches.    She eats 2-3 servings of fruits and vegetables daily.She consumes 3 sweetened beverage(s) daily.She " exercises with enough effort to increase her heart rate 10 to 19 minutes per day.  She exercises with enough effort to increase her heart rate 4 days per week. She is missing 1 dose(s) of medications per week.  She is not taking prescribed medications regularly due to side effects.    Patient was seen at the ED this past Jan 2023 for hypertensive urgency. She also informs me that she was hospitalized this past spring for high blood pressure. Currently on two antihypertensive medications, amlodipine and telmisartan. The records I have show that these medications would have run out 2 months ago. She denies being out of them. However, states that BPs have been up to 190's systolic on home monitor. She also reports that she has had a headache for the past 2 weeks. This is consistent with previous episodes of elevated BP.    Patient says that with her recent high BPs she has been more short of breath, fatigues more easily and feels out of sorts. I discussed with her, that given the symptoms and history, a face to face is necessary to further evaluate her concerns. I recommended that she be seen at the local urgent care as they can complete an EKG and labs.     Review of Systems   Neurological: Positive for headaches.      Constitutional, HEENT, cardiovascular, pulmonary, gi and gu systems are negative, except as otherwise noted.      Objective           Vitals:  No vitals were obtained today due to virtual visit.    Physical Exam   Limited due to inability to connect with video visit.             Video-Visit Details    Type of service:  Phone visit    8 minutes

## 2023-07-11 ENCOUNTER — TELEPHONE (OUTPATIENT)
Dept: NEPHROLOGY | Facility: CLINIC | Age: 38
End: 2023-07-11
Payer: COMMERCIAL

## 2023-07-11 NOTE — TELEPHONE ENCOUNTER
[facility] Call Center    Phone Message    May a detailed message be left on voicemail: yes     Reason for Call: Other: Please add new lab orders for this patient for 10/11 appointment for Dr. Amaral.  I believe the orders that are currently in Pt's chart will  before the visit. Thank you!    Action Taken: Message routed to:  [facility] Neph    Travel Screening: Not Applicable

## 2023-07-14 ENCOUNTER — TELEPHONE (OUTPATIENT)
Dept: NEPHROLOGY | Facility: CLINIC | Age: 38
End: 2023-07-14
Payer: COMMERCIAL

## 2023-07-14 NOTE — TELEPHONE ENCOUNTER
[facility] Call Center    Phone Message    May a detailed message be left on voicemail: yes     Reason for Call: Symptoms or Concerns     If patient has red-flag symptoms, warm transfer to triage line    Current symptom or concern: Patient is calling with some concerns with her BP. States she went into urgent care for her wrist pain and her BP readings were 240/130 and 210/110. Otherwise feeling ok besides the pain in wrist. Mentions that her BP has been elevated the last 2 weeks. Please reach out. Thank you    Symptoms have been present for:  2 week(s)    Has patient previously been seen for this? No          Action Taken: Message routed to:  [facility] NEPH    Travel Screening: Not Applicable

## 2023-07-14 NOTE — TELEPHONE ENCOUNTER
Call to patient. She reports that her BP has been elevated over the last 2 weeks. Home -190/100. Today when she got home she reports her /110 after taking her medications.    Amlodipine 5 mg  telmisartan 80 mg    Patient denies chest pain, shortness of breath, vision changes. But she does report feeling tired more, having headaches and low energy. Patient wanting sooner appointment.  Will send to Dr Amaral for review and recommendations  Cate Marshall LPN  Nephrology  [phone]

## 2023-07-15 ENCOUNTER — HOSPITAL ENCOUNTER (EMERGENCY)
Facility: CLINIC | Age: 38
Discharge: HOME | End: 2023-07-15
Attending: EMERGENCY MEDICINE | Admitting: EMERGENCY MEDICINE
Payer: COMMERCIAL

## 2023-07-15 VITALS
DIASTOLIC BLOOD PRESSURE: 96 MMHG | OXYGEN SATURATION: 100 % | HEART RATE: 67 BPM | SYSTOLIC BLOOD PRESSURE: 174 MMHG | TEMPERATURE: 98.3 F | HEIGHT: 70 IN | BODY MASS INDEX: 41.95 KG/M2 | RESPIRATION RATE: 29 BRPM | WEIGHT: 293 LBS

## 2023-07-15 DIAGNOSIS — I15.0 RENOVASCULAR HYPERTENSION: ICD-10-CM

## 2023-07-15 LAB
ALBUMIN SERPL BCG-MCNC: 4.1 G/DL (ref 3.5–5.2)
ALP SERPL-CCNC: 54 U/L (ref 35–104)
ALT SERPL W P-5'-P-CCNC: 13 U/L (ref 0–50)
ANION GAP SERPL CALCULATED.3IONS-SCNC: 7 MMOL/L (ref 7–15)
AST SERPL W P-5'-P-CCNC: 13 U/L (ref 0–45)
BASOPHILS # BLD AUTO: 0 10E3/UL (ref 0–0.2)
BASOPHILS NFR BLD AUTO: 0 %
BILIRUB SERPL-MCNC: 0.3 MG/DL
BUN SERPL-MCNC: 10.7 MG/DL (ref 6–20)
CALCIUM SERPL-MCNC: 8.8 MG/DL (ref 8.6–10)
CHLORIDE SERPL-SCNC: 107 MMOL/L (ref 98–107)
CREAT SERPL-MCNC: 0.76 MG/DL (ref 0.51–0.95)
DEPRECATED HCO3 PLAS-SCNC: 25 MMOL/L (ref 22–29)
EOSINOPHIL # BLD AUTO: 0.2 10E3/UL (ref 0–0.7)
EOSINOPHIL NFR BLD AUTO: 2 %
ERYTHROCYTE [DISTWIDTH] IN BLOOD BY AUTOMATED COUNT: 13.1 % (ref 10–15)
GFR SERPL CREATININE-BSD FRML MDRD: >90 ML/MIN/1.73M2
GLUCOSE SERPL-MCNC: 99 MG/DL (ref 70–99)
HCT VFR BLD AUTO: 38.4 % (ref 35–47)
HGB BLD-MCNC: 12.4 G/DL (ref 11.7–15.7)
IMM GRANULOCYTES # BLD: 0 10E3/UL
IMM GRANULOCYTES NFR BLD: 0 %
LYMPHOCYTES # BLD AUTO: 4.1 10E3/UL (ref 0.8–5.3)
LYMPHOCYTES NFR BLD AUTO: 38 %
MAGNESIUM SERPL-MCNC: 2 MG/DL (ref 1.7–2.3)
MCH RBC QN AUTO: 28.5 PG (ref 26.5–33)
MCHC RBC AUTO-ENTMCNC: 32.3 G/DL (ref 31.5–36.5)
MCV RBC AUTO: 88 FL (ref 78–100)
MONOCYTES # BLD AUTO: 0.9 10E3/UL (ref 0–1.3)
MONOCYTES NFR BLD AUTO: 8 %
NEUTROPHILS # BLD AUTO: 5.6 10E3/UL (ref 1.6–8.3)
NEUTROPHILS NFR BLD AUTO: 52 %
NRBC # BLD AUTO: 0 10E3/UL
NRBC BLD AUTO-RTO: 0 /100
PLATELET # BLD AUTO: 219 10E3/UL (ref 150–450)
POTASSIUM SERPL-SCNC: 4.5 MMOL/L (ref 3.4–5.3)
PROT SERPL-MCNC: 6.4 G/DL (ref 6.4–8.3)
RBC # BLD AUTO: 4.35 10E6/UL (ref 3.8–5.2)
SODIUM SERPL-SCNC: 139 MMOL/L (ref 136–145)
TROPONIN T SERPL HS-MCNC: <6 NG/L
WBC # BLD AUTO: 10.8 10E3/UL (ref 4–11)

## 2023-07-15 PROCEDURE — 99284 EMERGENCY DEPT VISIT MOD MDM: CPT | Performed by: NURSE PRACTITIONER

## 2023-07-15 PROCEDURE — 93010 ELECTROCARDIOGRAM REPORT: CPT | Performed by: EMERGENCY MEDICINE

## 2023-07-15 PROCEDURE — 93005 ELECTROCARDIOGRAM TRACING: CPT | Performed by: EMERGENCY MEDICINE

## 2023-07-15 PROCEDURE — 83735 ASSAY OF MAGNESIUM: CPT | Performed by: EMERGENCY MEDICINE

## 2023-07-15 PROCEDURE — 250N000013 HC RX MED GY IP 250 OP 250 PS 637: Performed by: EMERGENCY MEDICINE

## 2023-07-15 PROCEDURE — 99284 EMERGENCY DEPT VISIT MOD MDM: CPT | Mod: 25 | Performed by: EMERGENCY MEDICINE

## 2023-07-15 PROCEDURE — 36415 COLL VENOUS BLD VENIPUNCTURE: CPT | Performed by: EMERGENCY MEDICINE

## 2023-07-15 PROCEDURE — 80053 COMPREHEN METABOLIC PANEL: CPT | Performed by: EMERGENCY MEDICINE

## 2023-07-15 PROCEDURE — 85025 COMPLETE CBC W/AUTO DIFF WBC: CPT | Performed by: EMERGENCY MEDICINE

## 2023-07-15 PROCEDURE — 84484 ASSAY OF TROPONIN QUANT: CPT | Performed by: EMERGENCY MEDICINE

## 2023-07-15 RX ORDER — HYDROCHLOROTHIAZIDE 25 MG/1
25 TABLET ORAL DAILY
Qty: 30 TABLET | Refills: 0 | Status: SHIPPED | OUTPATIENT
Start: 2023-07-15 | End: 2023-07-17

## 2023-07-15 RX ORDER — HYDROCHLOROTHIAZIDE 25 MG/1
25 TABLET ORAL ONCE
Status: COMPLETED | OUTPATIENT
Start: 2023-07-15 | End: 2023-07-15

## 2023-07-15 RX ADMIN — HYDROCHLOROTHIAZIDE 25 MG: 25 TABLET ORAL at 21:09

## 2023-07-15 ASSESSMENT — ACTIVITIES OF DAILY LIVING (ADL)
ADLS_ACUITY_SCORE: 35
ADLS_ACUITY_SCORE: 35

## 2023-07-15 NOTE — ED TRIAGE NOTES
Pt presents with concerns related to hypertension.  Pt states for the last few weeks she has increasing shortness of breath, lethargy, dizziness, headaches, and chest pain.    Pt was seen in urgent care yesterday for wrist pain and the blood pressure was elevated.       Triage Assessment       Row Name 07/15/23 1149       Triage Assessment (Adult)    Airway WDL WDL       Respiratory WDL    Respiratory WDL WDL       Skin Circulation/Temperature WDL    Skin Circulation/Temperature WDL WDL       Cardiac WDL    Cardiac WDL X;chest pain  for the past two weeks and hypertension.  Pt is on blood pressure medication       Peripheral/Neurovascular WDL    Peripheral Neurovascular WDL WDL       Cognitive/Neuro/Behavioral WDL    Cognitive/Neuro/Behavioral WDL X  dizziness    Level of Consciousness alert    Orientation oriented x 4

## 2023-07-16 NOTE — ED PROVIDER NOTES
History     Chief Complaint   Patient presents with    Hypertension     HPI  History per patient, medical records    This is a 37-year-old female, history of renal artery stenosis, hypertension, GERD, migraine presenting with hypertension.  Patient states for the last 2 weeks she has noted her blood pressure has been elevated.  She was running in the 130s to 150s systolic but has had pressures in the 150s to 180s.  She has been tired/exhausted and had dizzy spells to the point where she felt like she wanted to pass out.  She also has had more headaches than usual.  She takes Excedrin as needed but has been trying to not overuse it.  She denies any recent illnesses.  No diet changes.  No herbal or over-the-counter medications.  However on further questioning she stated about a month ago she had gained a lot of water weight so she took an over-the-counter diuretic, Diurex, for about 3 days and had improvement.  She notes an occasional cough.  She states she has had about 30 pound weight gain since December.  Normal bowel movements, no diarrhea, blackness or blood in the stools.  Normal urination.  She states she was hospitalized for potassium replacement in January 2023 and she is feeling some of the symptoms she had then.  She was seen in urgent care for wrist pain and blood pressure in the urgent care was over 200 systolic.  Patient is on amlodipine 5 mg and telmisartan 80 mg daily.  She reports she has been taking her medications as prescribed.    Allergies:  Allergies   Allergen Reactions    Aluminum Hives     Reaction when pt comes into contact with aluminum.    Benadryl [Diphenhydramine-Zinc Acetate]     Unknown [No Clinical Screening - See Comments] Rash     Adhesives       Problem List:    Patient Active Problem List    Diagnosis Date Noted    Morbid obesity (H) 07/06/2022     Priority: Medium    Plica of knee, right 01/28/2016     Priority: Medium    Renal artery stenosis (H) 01/13/2016     Priority: Medium      Surgery in 2009      Chondromalacia patellae, right 09/10/2015     Priority: Medium    Synovitis of knee 08/13/2015     Priority: Medium    CARDIOVASCULAR SCREENING; LDL GOAL LESS THAN 160 10/31/2010     Priority: Medium    GERD (gastroesophageal reflux disease) 05/28/2008     Priority: Medium    Elevated blood pressure reading without diagnosis of hypertension 03/19/2008     Priority: Medium    Headache 03/19/2008     Priority: Medium     Problem list name updated by automated process. Provider to review      Syncope and collapse 03/19/2008     Priority: Medium        Past Medical History:    No past medical history on file.    Past Surgical History:    Past Surgical History:   Procedure Laterality Date    ARTHROSCOPY KNEE Right 1/22/2016    Procedure: ARTHROSCOPY KNEE;  Surgeon: Ian Jaimes MD;  Location: [facility] OR    GENITOURINARY SURGERY      HC KNEE SCOPE,PART SYNOVECT Right 1/22/16    Medial plica excision - WORK COMP       Family History:    Family History   Problem Relation Age of Onset    Coronary Artery Disease No family hx of     Colon Cancer No family hx of     Mental Illness No family hx of     Obesity No family hx of     Osteoporosis No family hx of     Depression No family hx of     Cerebrovascular Disease No family hx of        Social History:  Marital Status:  Single [1]  Social History     Tobacco Use    Smoking status: Never    Smokeless tobacco: Never    Tobacco comments:     No smokers in home.   Vaping Use    Vaping Use: Never used   Substance Use Topics    Alcohol use: No    Drug use: No        Medications:    hydrochlorothiazide (HYDRODIURIL) 25 MG tablet  amLODIPine (NORVASC) 5 MG tablet  BP MONITOR-STETHOSCOPE KIT  FLONASE 50 MCG/ACT NA SUSP  losartan (COZAAR) 100 MG tablet  telmisartan (MICARDIS) 80 MG tablet          Review of Systems  All other ROS reviewed and are negative or non-contributory except as stated in HPI.     Physical Exam   BP: (!) 201/104  Pulse: 76  Temp: 98.3  " F (36.8  C)  Resp: 18  Height: 177.8 cm (5' 10\")  Weight: 133.8 kg (295 lb)  SpO2: 100 %      Physical Exam  Vitals and nursing note reviewed.   Constitutional:       Appearance: She is obese.      Comments: Pleasant, healthy appearing, slightly tired appearing female sitting in the bed   HENT:      Head: Normocephalic.      Right Ear: Tympanic membrane and ear canal normal.      Left Ear: Tympanic membrane and ear canal normal.      Ears:      Comments: Patient has a number of earrings, some of which are used for her migraine symptoms with improvement     Nose: Nose normal.      Mouth/Throat:      Mouth: Mucous membranes are moist.      Pharynx: Oropharynx is clear.   Eyes:      General: No scleral icterus.     Extraocular Movements: Extraocular movements intact.      Conjunctiva/sclera: Conjunctivae normal.   Cardiovascular:      Rate and Rhythm: Normal rate and regular rhythm.      Pulses: Normal pulses.      Heart sounds: Normal heart sounds.   Pulmonary:      Effort: Pulmonary effort is normal.      Breath sounds: Normal breath sounds.   Abdominal:      Palpations: Abdomen is soft.      Tenderness: There is no abdominal tenderness.   Musculoskeletal:         General: Normal range of motion.      Cervical back: Normal range of motion and neck supple. No tenderness.      Right lower leg: Edema present.      Left lower leg: Edema present.      Comments: Mild bilateral lower extremity pitting edema   Neurological:      General: No focal deficit present.      Mental Status: She is alert and oriented to person, place, and time.   Psychiatric:         Mood and Affect: Mood normal.         Behavior: Behavior normal.         ED Course (with Medical Decision Making)    Pt seen and examined by me.  RN and EPIC notes reviewed.       Patient with hypertension related to renal artery stenosis presenting with elevated blood pressures, increased over the last couple of weeks.  She has some headaches, lightheadedness, fatigue, " mild dyspnea on exertion.  On exam her blood pressure is elevated initially, 200/104.  Normal heart rate.  Normal oxygenation.    Plan to monitor.  Check labs, EKG.    EKG shows normal sinus rhythm with a rate of 64.  She has nonspecific T wave flattening.  No ectopy.  No ST segment abnormalities.  Read by myself at 703.    Patient's labs are all basically within normal limits as noted below.  Her blood pressures have been around 160s to 170s systolic at rest.    I contacted patient's nephrologist, Dr. Amaral.  She reviewed patient's chart and felt patient would be appropriate to start a low-dose diuretic.  I am going to give her hydrochlorothiazide 25 mg daily.  Her nephrologist plans to follow-up with her early this week.  Patient can continue to monitor blood pressures.  Hopefully as they are coming down she will start to feel improvement.  If it anytime she notes any significant worsening, changes or concerns return promptly at any time.  Patient comfortable with the plan.   Procedures  Results for orders placed or performed during the hospital encounter of 07/15/23 (from the past 24 hour(s))   CBC with platelets differential    Narrative    The following orders were created for panel order CBC with platelets differential.  Procedure                               Abnormality         Status                     ---------                               -----------         ------                     CBC with platelets and d...[[id number]]                      Final result                 Please view results for these tests on the individual orders.   Comprehensive metabolic panel   Result Value Ref Range    Sodium 139 136 - 145 mmol/L    Potassium 4.5 3.4 - 5.3 mmol/L    Chloride 107 98 - 107 mmol/L    Carbon Dioxide (CO2) 25 22 - 29 mmol/L    Anion Gap 7 7 - 15 mmol/L    Urea Nitrogen 10.7 6.0 - 20.0 mg/dL    Creatinine 0.76 0.51 - 0.95 mg/dL    Calcium 8.8 8.6 - 10.0 mg/dL    Glucose 99 70 - 99 mg/dL    Alkaline  Phosphatase 54 35 - 104 U/L    AST 13 0 - 45 U/L    ALT 13 0 - 50 U/L    Protein Total 6.4 6.4 - 8.3 g/dL    Albumin 4.1 3.5 - 5.2 g/dL    Bilirubin Total 0.3 <=1.2 mg/dL    GFR Estimate >90 >60 mL/min/1.73m2   Magnesium   Result Value Ref Range    Magnesium 2.0 1.7 - 2.3 mg/dL   Troponin T, High Sensitivity   Result Value Ref Range    Troponin T, High Sensitivity <6 <=14 ng/L   CBC with platelets and differential   Result Value Ref Range    WBC Count 10.8 4.0 - 11.0 10e3/uL    RBC Count 4.35 3.80 - 5.20 10e6/uL    Hemoglobin 12.4 11.7 - 15.7 g/dL    Hematocrit 38.4 35.0 - 47.0 %    MCV 88 78 - 100 fL    MCH 28.5 26.5 - 33.0 pg    MCHC 32.3 31.5 - 36.5 g/dL    RDW 13.1 10.0 - 15.0 %    Platelet Count 219 150 - 450 10e3/uL    % Neutrophils 52 %    % Lymphocytes 38 %    % Monocytes 8 %    % Eosinophils 2 %    % Basophils 0 %    % Immature Granulocytes 0 %    NRBCs per 100 WBC 0 <1 /100    Absolute Neutrophils 5.6 1.6 - 8.3 10e3/uL    Absolute Lymphocytes 4.1 0.8 - 5.3 10e3/uL    Absolute Monocytes 0.9 0.0 - 1.3 10e3/uL    Absolute Eosinophils 0.2 0.0 - 0.7 10e3/uL    Absolute Basophils 0.0 0.0 - 0.2 10e3/uL    Absolute Immature Granulocytes 0.0 <=0.4 10e3/uL    Absolute NRBCs 0.0 10e3/uL       Medications   hydrochlorothiazide (HYDRODIURIL) tablet 25 mg (25 mg Oral $Given 7/15/23 2109)       Assessments & Plan      I have reviewed the findings, diagnosis, plan and need for follow up with the patient.  Discharge Medication List as of 7/15/2023  9:03 PM        START taking these medications    Details   hydrochlorothiazide (HYDRODIURIL) 25 MG tablet Take 1 tablet (25 mg) by mouth daily, Disp-30 tablet, R-0, E-Prescribe             Final diagnoses:   Renovascular hypertension     Disposition: Patient discharged home in stable condition.  Plan as above.  Return for concerns.     Note: Chart documentation done in part with Dragon Voice Recognition software. Although reviewed after completion, some word and grammatical  errors may remain.     7/15/2023   [facility] EMERGENCY DEPT       Daniela Simeon MD  07/16/23 0246

## 2023-07-16 NOTE — DISCHARGE INSTRUCTIONS
Start hydrochlorothiazide as prescribed.  Okay to take the next dose tomorrow.    Follow-up with your nephrologist.  She will plan on contacting you on Monday.    Continue to monitor your blood pressures.  You can keep a log at home.    Return for worsening, changes or concerns.    I hope you start to feel much better quickly!!

## 2023-07-17 ENCOUNTER — OFFICE VISIT (OUTPATIENT)
Dept: NEPHROLOGY | Facility: CLINIC | Age: 38
End: 2023-07-17
Attending: INTERNAL MEDICINE
Payer: COMMERCIAL

## 2023-07-17 VITALS
SYSTOLIC BLOOD PRESSURE: 154 MMHG | WEIGHT: 285.3 LBS | HEART RATE: 77 BPM | DIASTOLIC BLOOD PRESSURE: 109 MMHG | TEMPERATURE: 98 F | BODY MASS INDEX: 40.94 KG/M2 | OXYGEN SATURATION: 98 %

## 2023-07-17 DIAGNOSIS — I77.3 FIBROMUSCULAR DYSPLASIA (H): Primary | ICD-10-CM

## 2023-07-17 DIAGNOSIS — I10 SEVERE HYPERTENSION: ICD-10-CM

## 2023-07-17 DIAGNOSIS — G43.011 INTRACTABLE MIGRAINE WITHOUT AURA AND WITH STATUS MIGRAINOSUS: ICD-10-CM

## 2023-07-17 DIAGNOSIS — I12.9 HYPERTENSION, RENAL: ICD-10-CM

## 2023-07-17 PROCEDURE — 99214 OFFICE O/P EST MOD 30 MIN: CPT | Performed by: INTERNAL MEDICINE

## 2023-07-17 PROCEDURE — G0463 HOSPITAL OUTPT CLINIC VISIT: HCPCS | Performed by: INTERNAL MEDICINE

## 2023-07-17 RX ORDER — AMLODIPINE BESYLATE 5 MG/1
5 TABLET ORAL DAILY
Qty: 120 TABLET | Refills: 3 | Status: SHIPPED | OUTPATIENT
Start: 2023-07-17 | End: 2023-10-17

## 2023-07-17 RX ORDER — HYDROCHLOROTHIAZIDE 25 MG/1
25 TABLET ORAL DAILY
Qty: 30 TABLET | Refills: 3 | Status: SHIPPED | OUTPATIENT
Start: 2023-07-17 | End: 2023-10-17

## 2023-07-17 RX ORDER — HYDROCHLOROTHIAZIDE 25 MG/1
25 TABLET ORAL DAILY
Qty: 30 TABLET | Refills: 3 | Status: SHIPPED | OUTPATIENT
Start: 2023-07-17 | End: 2023-07-17

## 2023-07-17 RX ORDER — TELMISARTAN 80 MG/1
80 TABLET ORAL DAILY
Qty: 120 TABLET | Refills: 3 | Status: SHIPPED | OUTPATIENT
Start: 2023-07-17

## 2023-07-17 RX ORDER — TELMISARTAN 80 MG/1
80 TABLET ORAL DAILY
Qty: 30 TABLET | Refills: 3 | Status: SHIPPED | OUTPATIENT
Start: 2023-07-17 | End: 2023-07-17

## 2023-07-17 ASSESSMENT — PAIN SCALES - GENERAL: PAINLEVEL: SEVERE PAIN (6)

## 2023-07-17 NOTE — TELEPHONE ENCOUNTER
Call to patient per Dr Munir king to come in person today. Patient states she will be here. Scheduled appointment  Cate Marshall LPN  Nephrology  [phone]

## 2023-07-17 NOTE — PATIENT INSTRUCTIONS
It was a pleasure taking care of you today.  I've included a brief summary of our discussion and care plan from today's visit below.  Please review this information with your primary care provider.  _______________________________________________________________________    My recommendations are summarized as follows:  -Keep the amount of sodium in your diet at 2.4 g/day (also see instructions attached in that regard)  -Keep a Blood Pressure diary by taking your blood pressure twice a day as instructed (also see instructions attached in that regard). Please make sure that you are using a validated blood pressure device by checking that it is the case at: https://www.validatebp.org/  -Avoid all NSAID's. Examples include Ibuprofen (Advil, Motrin), naprosyn (Aleve), celebrex among others. Acetaminophen (Tylenol) is ok with maximum dose in 24 hours of 3200 mg.  -Also avoid excedrin due to the fact that it contains caffeine  -Healthy lifestyle measures will keep your kidney's functioning at their current best. This includes regular exercise, weight loss and smoking cessation if you smoke.   -Do not exceed one alcoholic drink per day  -If for any reason, you are at risk of volume depletion/dehydration (high grade fevers, severe vomiting or diarrhea) stop telmisartan and hydrochlorothiazide till you get better  -Do labs in 2 weeks from now  -Do a CT scan of the renal arteries      To schedule imaging please call [phone]     To schedule your lab appointment at the [facility], please call       Return to Nephrology Clinic in 3 months to review your progress.    _______________________________________________________________________    Who do I call with any questions after my visit?    Please be in touch if there are any further questions that arise following today's visit.  There are multiple ways to contact your nephrology care team.      During business hours, you may reach your  Nephrology N Care Coordinator, Cate, at .      To schedule or reschedule an appointment, please call [phone].    You can always send a secure message through [employer].  [employer] messages are answered by your nurse or doctor typically within 24 hours.  Please allow extra time on weekends and holidays.      For urgent/emergent questions after business hours, you may reach the on-call Nephrology Fellow by contacting the [facility]  at [phone].     How will I get the results of any tests ordered?    You will receive all of your results.  If you have signed up for [employer], any tests ordered at your visit will be available to you after your physician reviews them.  Typically this takes 1-2 weeks.  If there are urgent results that require a change in your care plan, your physician or nurse will call you to discuss the next steps.      What is [employer]?  [employer] is a secure way for you to access all of your healthcare records from the [facility].  It is a web based computer program, so you can sign on to it from any location.  It also allows you to send secure messages to your care team.  I recommend signing up for [employer] access if you have not already done so and are comfortable with using a computer.      How do I schedule a follow-up visit?  If you did not schedule a follow-up visit today, please call [phone] to schedule a follow-up office visit.        Sincerely,      Dr. Sri Amaral  [facility]  Division of Nephrology and Hypertension

## 2023-07-17 NOTE — PROGRESS NOTES
Nephrology Clinic Note  January 25, 2023    CC: Management of HTN due to AISSATOU, S/P midrenal artery angioplasty due to FMD  In 7/17/ 2008    BP: 139/95 to 155/106 today    HPI: Leah Holloway is a 37 year old female who presents for follow up for her uncontrolled hypertension.  The patient has a history of hypertension since at least 2008 thought to be secondary to right renal artery stenosis for which she underwent right midrenal artery 5 mm angioplasty for stenosis related to fibromuscular  dysplasia in the [facility] on 7/17/2008. She also has a h/o severe obesity (BMI around 38 ->42) and seasonal allergies.  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  Proximal superior aneurysm measures 7.8 mm in diameter. Artery between  the aneurysms measures 4.3 mm in diameter. Inferior more distal  aneurysm measures 8.6 mm in diameter. In 2018, the proximal aneurysm  measured 7.8 mm and the distal aneurysm measured 8.4 mm.      Two right and single left renal arteries. Superior right renal artery is the main renal artery. Smaller inferior right renal artery originates anteriorly from the aorta 16 mm inferior to the main superior right renal artery and supplies a portion of the ventral lower pole.     No renal artery stenosis or dissection demonstrated. No renal artery arterial beading suggested.     Patent aorta without aneurysm, dissection, or stenosis. Celiac,  superior mesenteric, and inferior mesenteric arteries are patent  without stenosis, beading, or aneurysm. Bilateral common, internal,  and external iliac arteries patent. Bilateral corona mortis. Bilateral  common femoral arteries patent.     Her maternal grandmother has hypertension and her father started having hypertension at the age of 55.     Her renal function is intact however she had low potassium levels in the  past. Her potassium level is 4.5 on 7/15.    She gained at least 15 lbs over the past 6 months and has had severe headaches over the past two weeks for which she has been taking intermittently ibuprofen and excedrin. She presented on 7/15 to the ED with hypertensive emergency and hydrochlorothiazide 25 mg daily was added to her regimen. Her BP has since then improved a little bit.      Mentioned that she is buzy and its hard for her to eat balanced diet and eats take out often.         Allergies   Allergen Reactions     Aluminum Hives     Reaction when pt comes into contact with aluminum.     Benadryl [Diphenhydramine-Zinc Acetate]      Unknown [No Clinical Screening - See Comments] Rash     Adhesives       amLODIPine (NORVASC) 5 MG tablet, Take 1 tablet (5 mg) by mouth daily for 120 days  BP MONITOR-STETHOSCOPE KIT, test as directed  FLONASE 50 MCG/ACT NA SUSP, USE 2 SPRAYS IN EACH NOSTRIL ONCE DAILY  hydrochlorothiazide (HYDRODIURIL) 25 MG tablet, Take 1 tablet (25 mg) by mouth daily  losartan (COZAAR) 100 MG tablet, Take 1 tablet by mouth daily at 2 pm  telmisartan (MICARDIS) 80 MG tablet, Take 1 tablet (80 mg) by mouth daily for 120 days    No current facility-administered medications on file prior to visit.      No past medical history on file.    Past Surgical History:   Procedure Laterality Date     ARTHROSCOPY KNEE Right 1/22/2016    Procedure: ARTHROSCOPY KNEE;  Surgeon: Ian Jaimes MD;  Location: [facility] OR     GENITOURINARY SURGERY       HC KNEE SCOPE,PART SYNOVECT Right 1/22/16    Medial plica excision - WORK COMP       Social History     Tobacco Use     Smoking status: Never     Smokeless tobacco: Never     Tobacco comments:     No smokers in home.   Vaping Use     Vaping Use: Never used   Substance Use Topics     Alcohol use: No     Drug use: No       Family History   Problem Relation Age of Onset     Coronary Artery Disease No family hx of      Colon Cancer No family hx of      Mental Illness No  family hx of      Obesity No family hx of      Osteoporosis No family hx of      Depression No family hx of      Cerebrovascular Disease No family hx of        ROS: A 12 system review of systems was negative other than noted here or above.     Exam:  LMP  (LMP Unknown)     GENERAL APPEARANCE: alert and no distress  EYES: PERRL, no scleral icterus  HENT: mouth without ulcers or lesions  NECK: supple, no adenopathy  RESP: lungs clear to auscultation   CV: regular rhythm, normal rate, no rub  Extremities: no clubbing, cyanosis, or edema  SKIN: no rash  NEURO: mentation intact and speech normal  PSYCH: affect normal/bright      Results:    Lab on 01/25/2023   Component Date Value Ref Range Status     WBC Count 01/25/2023 8.3  4.0 - 11.0 10e3/uL Final     RBC Count 01/25/2023 4.57  3.80 - 5.20 10e6/uL Final     Hemoglobin 01/25/2023 12.9  11.7 - 15.7 g/dL Final     Hematocrit 01/25/2023 39.3  35.0 - 47.0 % Final     MCV 01/25/2023 86  78 - 100 fL Final     MCH 01/25/2023 28.2  26.5 - 33.0 pg Final     MCHC 01/25/2023 32.8  31.5 - 36.5 g/dL Final     RDW 01/25/2023 12.7  10.0 - 15.0 % Final     Platelet Count 01/25/2023 208  150 - 450 10e3/uL Final     % Neutrophils 01/25/2023 53  % Final     % Lymphocytes 01/25/2023 37  % Final     % Monocytes 01/25/2023 8  % Final     % Eosinophils 01/25/2023 2  % Final     % Basophils 01/25/2023 0  % Final     % Immature Granulocytes 01/25/2023 0  % Final     NRBCs per 100 WBC 01/25/2023 0  <1 /100 Final     Absolute Neutrophils 01/25/2023 4.4  1.6 - 8.3 10e3/uL Final     Absolute Lymphocytes 01/25/2023 3.1  0.8 - 5.3 10e3/uL Final     Absolute Monocytes 01/25/2023 0.7  0.0 - 1.3 10e3/uL Final     Absolute Eosinophils 01/25/2023 0.1  0.0 - 0.7 10e3/uL Final     Absolute Basophils 01/25/2023 0.0  0.0 - 0.2 10e3/uL Final     Absolute Immature Granulocytes 01/25/2023 0.0  <=0.4 10e3/uL Final     Absolute NRBCs 01/25/2023 0.0  10e3/uL Final       Assessment/Plan:   Leah Holloway is a 37  year old female who presents for follow up for her uncontrolled hypertension.    #Hypertension: no evidence of renal artery stenosis on  Imaging done in July 2022 however given rising BP will reorder a CTA of the abdomen. Given the positive family history there could be a genetic component but also obesity and high sodium intake are also contributory along with ibuprofen and excedrin intake. In addition, it is hard to determine whether her headaches are raising the BP or the other way around.  -Pursue for now the recently added hydrochlorothiazide, telmisartan 80 mg daily and amlodipine 5 mg and check a CMP in 2 weeks.  - BP diary and communicate numbers via my chart.  - Decrease sodium diet and processed food  -Stop ibuprofen and excedrin and refer to neurology for headache control  - Exercise and lose weight   - Follow up in 3 months      Sri Amaral MD  Division of Nephrology and Hypertension  [facility]

## 2023-07-17 NOTE — NURSING NOTE
Chief Complaint   Patient presents with     RECHECK     Return visit.     Blood pressure (!) 154/109, pulse 77, temperature 98  F (36.7  C), weight 129.4 kg (285 lb 4.8 oz), SpO2 98 %, not currently breastfeeding.    LEONARDO BOYLE

## 2023-07-18 ENCOUNTER — HOSPITAL ENCOUNTER (OUTPATIENT)
Dept: CT IMAGING | Facility: CLINIC | Age: 38
Discharge: HOME | End: 2023-07-18
Attending: INTERNAL MEDICINE | Admitting: INTERNAL MEDICINE
Payer: COMMERCIAL

## 2023-07-18 DIAGNOSIS — I77.3 FIBROMUSCULAR DYSPLASIA (H): ICD-10-CM

## 2023-07-18 DIAGNOSIS — I12.9 HYPERTENSION, RENAL: ICD-10-CM

## 2023-07-18 DIAGNOSIS — I10 SEVERE HYPERTENSION: ICD-10-CM

## 2023-07-18 PROCEDURE — 250N000011 HC RX IP 250 OP 636: Performed by: INTERNAL MEDICINE

## 2023-07-18 PROCEDURE — 74175 CTA ABDOMEN W/CONTRAST: CPT

## 2023-07-18 PROCEDURE — 250N000009 HC RX 250: Performed by: INTERNAL MEDICINE

## 2023-07-18 RX ORDER — IOPAMIDOL 755 MG/ML
500 INJECTION, SOLUTION INTRAVASCULAR ONCE
Status: COMPLETED | OUTPATIENT
Start: 2023-07-18 | End: 2023-07-18

## 2023-07-18 RX ADMIN — SODIUM CHLORIDE 80 ML: 9 INJECTION, SOLUTION INTRAVENOUS at 15:55

## 2023-07-18 RX ADMIN — IOPAMIDOL 80 ML: 755 INJECTION, SOLUTION INTRAVENOUS at 15:55

## 2023-07-28 ENCOUNTER — LAB (OUTPATIENT)
Dept: LAB | Facility: CLINIC | Age: 38
End: 2023-07-28
Payer: COMMERCIAL

## 2023-07-28 DIAGNOSIS — I77.3 FIBROMUSCULAR DYSPLASIA (H): ICD-10-CM

## 2023-07-28 DIAGNOSIS — I10 SEVERE HYPERTENSION: ICD-10-CM

## 2023-07-28 DIAGNOSIS — I12.9 HYPERTENSION, RENAL: ICD-10-CM

## 2023-07-28 LAB
ALBUMIN SERPL BCG-MCNC: 4.2 G/DL (ref 3.5–5.2)
ALBUMIN UR-MCNC: NEGATIVE MG/DL
ALP SERPL-CCNC: 55 U/L (ref 35–104)
ALT SERPL W P-5'-P-CCNC: 13 U/L (ref 0–50)
ANION GAP SERPL CALCULATED.3IONS-SCNC: 10 MMOL/L (ref 7–15)
APPEARANCE UR: CLEAR
AST SERPL W P-5'-P-CCNC: 12 U/L (ref 0–45)
BACTERIA #/AREA URNS HPF: ABNORMAL /HPF
BILIRUB SERPL-MCNC: 0.3 MG/DL
BILIRUB UR QL STRIP: NEGATIVE
BUN SERPL-MCNC: 16.8 MG/DL (ref 6–20)
CALCIUM SERPL-MCNC: 8.6 MG/DL (ref 8.6–10)
CHLORIDE SERPL-SCNC: 106 MMOL/L (ref 98–107)
COLOR UR AUTO: YELLOW
CREAT SERPL-MCNC: 0.89 MG/DL (ref 0.51–0.95)
DEPRECATED HCO3 PLAS-SCNC: 24 MMOL/L (ref 22–29)
GFR SERPL CREATININE-BSD FRML MDRD: 85 ML/MIN/1.73M2
GLUCOSE SERPL-MCNC: 112 MG/DL (ref 70–99)
GLUCOSE UR STRIP-MCNC: NEGATIVE MG/DL
HGB UR QL STRIP: ABNORMAL
KETONES UR STRIP-MCNC: NEGATIVE MG/DL
LEUKOCYTE ESTERASE UR QL STRIP: NEGATIVE
MUCOUS THREADS #/AREA URNS LPF: PRESENT /LPF
NITRATE UR QL: NEGATIVE
PH UR STRIP: 5 [PH] (ref 5–7)
POTASSIUM SERPL-SCNC: 3.5 MMOL/L (ref 3.4–5.3)
PROT SERPL-MCNC: 6.6 G/DL (ref 6.4–8.3)
RBC URINE: 0 /HPF
SODIUM SERPL-SCNC: 140 MMOL/L (ref 136–145)
SP GR UR STRIP: 1.02 (ref 1–1.03)
SQUAMOUS EPITHELIAL: 3 /HPF
UROBILINOGEN UR STRIP-MCNC: NORMAL MG/DL
WBC URINE: <1 /HPF

## 2023-07-28 PROCEDURE — 81001 URINALYSIS AUTO W/SCOPE: CPT

## 2023-07-28 PROCEDURE — 36415 COLL VENOUS BLD VENIPUNCTURE: CPT

## 2023-07-28 PROCEDURE — 84244 ASSAY OF RENIN: CPT | Mod: 90

## 2023-07-28 PROCEDURE — 82088 ASSAY OF ALDOSTERONE: CPT

## 2023-07-28 PROCEDURE — 99000 SPECIMEN HANDLING OFFICE-LAB: CPT

## 2023-07-28 PROCEDURE — 80053 COMPREHEN METABOLIC PANEL: CPT

## 2023-08-01 DIAGNOSIS — I12.9 HYPERTENSION, RENAL: Primary | ICD-10-CM

## 2023-08-01 LAB
ALDOST SERPL-MCNC: 14.5 NG/DL (ref 0–31)
RENIN PLAS-CCNC: 1.9 NG/ML/HR

## 2023-08-01 RX ORDER — SPIRONOLACTONE 25 MG/1
25 TABLET ORAL DAILY
Qty: 90 TABLET | Refills: 1 | Status: SHIPPED | OUTPATIENT
Start: 2023-08-01 | End: 2023-10-17

## 2023-08-04 LAB — ALDOST/RENIN PLAS-RTO: 7.6 {RATIO} (ref 0–25)

## 2023-09-17 ENCOUNTER — HEALTH MAINTENANCE LETTER (OUTPATIENT)
Age: 38
End: 2023-09-17

## 2023-09-26 ENCOUNTER — TELEPHONE (OUTPATIENT)
Dept: NEPHROLOGY | Facility: CLINIC | Age: 38
End: 2023-09-26
Payer: COMMERCIAL

## 2023-09-26 DIAGNOSIS — I12.9 HYPERTENSION, RENAL: ICD-10-CM

## 2023-09-26 DIAGNOSIS — I77.3 FIBROMUSCULAR DYSPLASIA (H): Primary | ICD-10-CM

## 2023-09-26 NOTE — TELEPHONE ENCOUNTER
[facility] Call Center    Phone Message    May a detailed message be left on voicemail: yes     Reason for Call: Other: Patient stated provider was suppose to be looking for someone to do surgery for her back in July/August but hasn't heard anything. Wants to know if provider has found someone. Patient states it's for Renal Artery. Please contact patient in regards to this message. Thank you     Action Taken: Message routed to:  [facility] ([facility]): Nephrology    Travel Screening: Not Applicable

## 2023-09-28 ENCOUNTER — TELEPHONE (OUTPATIENT)
Dept: OTHER | Facility: CLINIC | Age: 38
End: 2023-09-28
Payer: COMMERCIAL

## 2023-09-28 NOTE — TELEPHONE ENCOUNTER
Referral received via [employer] on 9/28/23.    Pt referred to [facility] by Sri Amaral MD for FMD.    Pt needs to be scheduled for NEW VASCULAR PATIENT consult with vascular medicine.  Will route to scheduling to coordinate an appointment at next available.    Appt note:  Pt referred to [facility] by Sri Amaral MD for FMD. CTA renal on 7/18/23     Della GAXIOLA, RN    [facility]  Office: [phone]  Fax: [phone]

## 2023-10-17 ENCOUNTER — OFFICE VISIT (OUTPATIENT)
Dept: OTHER | Facility: CLINIC | Age: 38
End: 2023-10-17
Attending: INTERNAL MEDICINE
Payer: COMMERCIAL

## 2023-10-17 VITALS
SYSTOLIC BLOOD PRESSURE: 159 MMHG | OXYGEN SATURATION: 100 % | BODY MASS INDEX: 41.95 KG/M2 | HEIGHT: 70 IN | HEART RATE: 71 BPM | WEIGHT: 293 LBS | DIASTOLIC BLOOD PRESSURE: 110 MMHG

## 2023-10-17 DIAGNOSIS — R53.83 OTHER FATIGUE: ICD-10-CM

## 2023-10-17 DIAGNOSIS — I77.3 FIBROMUSCULAR DYSPLASIA (H): Primary | ICD-10-CM

## 2023-10-17 DIAGNOSIS — I12.9 HYPERTENSION, RENAL: ICD-10-CM

## 2023-10-17 DIAGNOSIS — I10 SEVERE HYPERTENSION: ICD-10-CM

## 2023-10-17 PROCEDURE — 99205 OFFICE O/P NEW HI 60 MIN: CPT | Performed by: INTERNAL MEDICINE

## 2023-10-17 PROCEDURE — 99213 OFFICE O/P EST LOW 20 MIN: CPT | Performed by: INTERNAL MEDICINE

## 2023-10-17 RX ORDER — POTASSIUM CHLORIDE 750 MG/1
10 TABLET, EXTENDED RELEASE ORAL 2 TIMES DAILY
Qty: 30 TABLET | Refills: 3 | Status: SHIPPED | OUTPATIENT
Start: 2023-10-17

## 2023-10-17 RX ORDER — AMLODIPINE BESYLATE 5 MG/1
5 TABLET ORAL DAILY
Qty: 90 TABLET | Refills: 3 | Status: SHIPPED | OUTPATIENT
Start: 2023-10-17

## 2023-10-17 RX ORDER — HYDROCHLOROTHIAZIDE 25 MG/1
25 TABLET ORAL DAILY
Qty: 30 TABLET | Refills: 3 | Status: SHIPPED | OUTPATIENT
Start: 2023-10-17

## 2023-10-17 NOTE — PROGRESS NOTES
"Patient is here to discuss consult    BP (!) 176/116 (BP Location: Right arm, Patient Position: Chair, Cuff Size: Adult Large)   Pulse 71   Ht 5' 10\" (1.778 m)   Wt 294 lb 6.4 oz (133.5 kg)   SpO2 100%   BMI 42.24 kg/m      Questions patient would like addressed today are: N/A.    Refills are needed: No    Has homecare services and agency name:  Kenia HOLMAN    "

## 2023-10-17 NOTE — PROGRESS NOTES
INITIAL VASCULAR MEDICAL ASSESSMENT  REFERRAL SOURCE: Sri Amaral MD   REASON FOR CONSULT: Right renal FMD    HPI: Leah Holloway is an unmarried 37 year old female who presents for initial Vascular Medicine due to her her uncontrolled hypertension. The patient has a history of hypertension since at least 2008  secondary to right renal artery stenosis for which she underwent right midrenal artery 5 mm angioplasty for stenosis related to fibromuscular dysplasia in the [facility] on 7/17/2008. She also has a h/o severe obesity (Body mass index is 42.24 kg/m . ) and is constantly fatigued. She is unmarried and therefore unaware of whether or not she has apnea.     A CTA done on 7/18/23 showed changes consistent with fibromuscular dysplasia involving the larger of two right renal arteries.    During the last year her BP has been uncontrollable. She either does not tolerate or was not controlled on multiple meds including ace inhibitors, beta blockers, diuretics,  and high dose calcium channel blockers.     She gained at least 15 lbs over the past nine months and has had severe headaches during the summer for which she has been taking intermittently ibuprofen and excedrin. She presented on 7/15 to the ED with hypertensive emergency and hydrochlorothiazide 25 mg daily was added to her regimen. Her BP has since then improved a little bit, but her K dropped and she was switched to aldactone. On this she felt foggy and stopped taking it. She did not resume the diuretic.     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 Despite this she is still obese as she eats nutritionally poor quality fast food.      Her maternal grandmother has hypertension and her father started having hypertension at the age of 55.              Review Of Systems  General: constant fatigue  Skin: negative  Eyes: negative  Ears/Nose/Throat: negative  Respiratory: No shortness of  breath, dyspnea on exertion, cough, or hemoptysis  Cardiovascular: negative  Gastrointestinal: negative  Genitourinary: negative  Musculoskeletal: negative  Neurologic: negative  Psychiatric: negative  Hematologic/Lymphatic/Immunologic: negative  Endocrine: negative      PAST MEDICAL HISTORY:                No past medical history on file.    PAST SURGICAL HISTORY:                  Past Surgical History:   Procedure Laterality Date    ARTHROSCOPY KNEE Right 1/22/2016    Procedure: ARTHROSCOPY KNEE;  Surgeon: Ian Jaimes MD;  Location: [facility] OR    GENITOURINARY SURGERY      HC KNEE SCOPE,PART SYNOVECT Right 1/22/16    Medial plica excision - WORK COMP       CURRENT MEDICATIONS:                  Current Outpatient Medications   Medication Sig Dispense Refill    amLODIPine (NORVASC) 5 MG tablet Take 1 tablet (5 mg) by mouth daily 90 tablet 3    BP MONITOR-STETHOSCOPE KIT test as directed 1 kit 0    FLONASE 50 MCG/ACT NA SUSP USE 2 SPRAYS IN EACH NOSTRIL ONCE DAILY 3 Bottle 3    hydrochlorothiazide (HYDRODIURIL) 25 MG tablet Take 1 tablet (25 mg) by mouth daily 30 tablet 3    potassium chloride ER (KLOR-CON M) 10 MEQ CR tablet Take 1 tablet (10 mEq) by mouth 2 times daily 30 tablet 3    telmisartan (MICARDIS) 80 MG tablet Take 1 tablet (80 mg) by mouth daily 120 tablet 3       ALLERGIES:                  Allergies   Allergen Reactions    Aluminum Hives     Reaction when pt comes into contact with aluminum.    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 mgr.    Tobacco Use    Smoking status: Never    Smokeless tobacco: Never    Tobacco comments:     No  smokers in home.   Vaping Use    Vaping Use: Never used   Substance and Sexual Activity    Alcohol use: No    Drug use: No    Sexual activity: Never   Other Topics Concern    Parent/sibling w/ CABG, MI or angioplasty before 65F 55M? Not Asked   Social History Narrative    Not on file     Social Determinants of Health     Financial Resource Strain: Not on file   Food Insecurity: Not on file   Transportation Needs: Not on file   Physical Activity: Not on file   Stress: Not on file   Social Connections: Not on file   Interpersonal Safety: Not on file   Housing Stability: Not on file       FAMILY HISTORY:                   Family History   Problem Relation Age of Onset    Coronary Artery Disease No family hx of     Colon Cancer No family hx of     Mental Illness No family hx of     Obesity No family hx of     Osteoporosis No family hx of     Depression No family hx of     Cerebrovascular Disease No family hx of          Physical exam Reveals:    O/P: WNL  HEENT: WNL  NECK: No JVD, thyromegaly, or lymphadenopathy  HEART: RRR, no murmurs, gallops, or rubs  LUNGS: CTA bilaterally without rales, wheezes, or rhonchi  GI: NABS, nondistended, nontender, soft  EXT:without cyanosis, clubbing, or edema  NEURO: nonfocal  : no flank tenderness    CTA RENAL WITH CONTRAST  7/18/2023 4:00 PM      HISTORY: Fibromuscular dysplasia.     COMPARISON: CT angiogram of the renal arteries dated 7/11/2022.     TECHNIQUE: CT angiogram of the renal arteries was obtained following  the administration of 80 mL intravenous contrast. Images are reviewed  in multiple planes and 3-D reconstructions were also performed.  Radiation dose for this scan was reduced using automated exposure  control, adjustment of the mA and/or kV according to patient size, or  iterative reconstruction technique.     FINDINGS:   Vascular exam:  Abdominal aorta: The abdominal aorta is of normal caliber without  aneurysmal dilatation or significant stenosis.  Renal arteries:  There are two right renal arteries and one left renal  artery. There is a beaded appearance of the larger of the two right  renal arteries at its mid and distal aspects most consistent with  fibromuscular dysplasia. The left renal artery appears grossly  unremarkable.     The celiac trunk, superior mesenteric artery, and inferior mesenteric  artery are patent without significant stenoses. The proximal iliac  arteries are patent without significant stenoses.     Soft tissue exam: The lung bases are clear.     The visualized solid organs in the abdomen are unremarkable.     The visualized bowel is unremarkable. There is a small periumbilical  hernia containing mesenteric fat.                                                                      IMPRESSION: Changes most consistent with fibromuscular dysplasia  involving the larger of two right renal arteries.     RADHA POON MD         A/P:      (I77.3) Fibromuscular dysplasia of larger of two right renal arteries.(H24)  (primary encounter diagnosis)  Comment: We do not know if she has carotid or vertebral FMD. She has been poorly controlled form a htn perspective during the past year while on a three drug regimen including a diuretic. She is not presently on one, but her history of not responding form a htn perspective while on a diuretic qualifies her for renal artery angioplasty. I will order this today. We will attempt to get this done in the next week or two. She is medically cleared to proceed with renal PTA. She is willing to accept the risks of the procedure (including but not limited to groin access pseudoaneurysm renal or aortic perforation, bleeding, infection, death, need to undergo emergent surgery to repair complications of the procedure noted above)   Plan: IR Renal Angiogram Bilateral            (I12.9) Hypertension, renal  Comment: See above. Resume diuretic with potassium supplementation. Check BMP in one month, RTC one month for a virtual BP  check.   Plan: Basic metabolic panel, hydrochlorothiazide         (HYDRODIURIL) 25 MG tablet, potassium chloride         ER (KLOR-CON M) 10 MEQ CR tablet, IR Renal         Angiogram Bilateral; amLODIPine (NORVASC) 5 MG tablet              (R53.83) Other fatigue  Comment: Her STOP-BANG score is 4. She has nonrestorative sleep and is always fatigued. Obtain a sleep study.  Plan: Sleep Study Referral      75 minutes total medical care on today's date.

## 2023-10-18 ENCOUNTER — TELEPHONE (OUTPATIENT)
Dept: OTHER | Facility: CLINIC | Age: 38
End: 2023-10-18
Payer: COMMERCIAL

## 2023-10-18 RX ORDER — HEPARIN SODIUM 200 [USP'U]/100ML
1 INJECTION, SOLUTION INTRAVENOUS CONTINUOUS PRN
Status: CANCELLED | OUTPATIENT
Start: 2023-10-18

## 2023-10-18 NOTE — TELEPHONE ENCOUNTER
Follow-up to 10/17/23    Fasting lab (CMP) around 11/15/23  Virtual visit 3 days later.    Patient also needs to be scheduled for Renal Angiogram in the next couple of weeks.  Will discuss with IR RN.

## 2023-10-18 NOTE — TELEPHONE ENCOUNTER
Type of procedure: Renal angiogram   Location of surgery: [facility]  Date / Check in time: 10/27 : 6:30 am  Radiologist / Surgeon: Dr. Le  Pre-Op Appt Clinic/Dt: Dr. Hadley  Packet sent out:  10/18      Medication Instructions: take all am meds  Patient will arrange.    Cindy Martines RN  IR nurse clinician  [phone]

## 2023-10-18 NOTE — TELEPHONE ENCOUNTER
Future Appointments   Date Time Provider Department Center   10/27/2023  8:00 AM SH[facility]   11/15/2023  8:00 AM PH [facility]   11/20/2023  3:30 PM Darrell Hadley MD [facility]

## 2023-10-24 ENCOUNTER — TELEPHONE (OUTPATIENT)
Dept: NEUROLOGY | Facility: CLINIC | Age: 38
End: 2023-10-24
Payer: COMMERCIAL

## 2023-10-24 NOTE — TELEPHONE ENCOUNTER
Patient visit on 11-2-23 needs to be rescheduled in person as provider wants all first time patients scheduled in person and not virtually.    Patient is to call and confirm message was received and then appointment will be updated to reflect the change.

## 2023-10-26 RX ORDER — NALOXONE HYDROCHLORIDE 0.4 MG/ML
0.4 INJECTION, SOLUTION INTRAMUSCULAR; INTRAVENOUS; SUBCUTANEOUS
Status: CANCELLED | OUTPATIENT
Start: 2023-10-26

## 2023-10-26 RX ORDER — FENTANYL CITRATE 50 UG/ML
25-50 INJECTION, SOLUTION INTRAMUSCULAR; INTRAVENOUS EVERY 5 MIN PRN
Status: CANCELLED | OUTPATIENT
Start: 2023-10-26

## 2023-10-26 RX ORDER — NALOXONE HYDROCHLORIDE 0.4 MG/ML
0.2 INJECTION, SOLUTION INTRAMUSCULAR; INTRAVENOUS; SUBCUTANEOUS
Status: CANCELLED | OUTPATIENT
Start: 2023-10-26

## 2023-10-26 RX ORDER — FLUMAZENIL 0.1 MG/ML
0.2 INJECTION, SOLUTION INTRAVENOUS
Status: CANCELLED | OUTPATIENT
Start: 2023-10-26

## 2023-10-26 NOTE — TELEPHONE ENCOUNTER
2nd attempt to reach patient and convert appointment to in the clinic as virtual appointments are not approved for new patients

## 2023-10-27 ENCOUNTER — HOSPITAL ENCOUNTER (OUTPATIENT)
Dept: INTERVENTIONAL RADIOLOGY/VASCULAR | Facility: CLINIC | Age: 38
Discharge: HOME | End: 2023-10-27
Attending: INTERNAL MEDICINE | Admitting: RADIOLOGY
Payer: COMMERCIAL

## 2023-10-27 ENCOUNTER — HOSPITAL ENCOUNTER (OUTPATIENT)
Facility: CLINIC | Age: 38
Discharge: HOME | End: 2023-10-27
Admitting: RADIOLOGY
Payer: COMMERCIAL

## 2023-10-27 VITALS
OXYGEN SATURATION: 99 % | HEART RATE: 61 BPM | SYSTOLIC BLOOD PRESSURE: 128 MMHG | RESPIRATION RATE: 14 BRPM | DIASTOLIC BLOOD PRESSURE: 81 MMHG

## 2023-10-27 VITALS
SYSTOLIC BLOOD PRESSURE: 118 MMHG | HEIGHT: 70 IN | OXYGEN SATURATION: 96 % | RESPIRATION RATE: 18 BRPM | HEART RATE: 67 BPM | TEMPERATURE: 97.5 F | DIASTOLIC BLOOD PRESSURE: 67 MMHG | WEIGHT: 290 LBS | BODY MASS INDEX: 41.52 KG/M2

## 2023-10-27 DIAGNOSIS — I77.3 FIBROMUSCULAR DYSPLASIA (H): ICD-10-CM

## 2023-10-27 DIAGNOSIS — I70.1 RENAL ARTERY STENOSIS (H): ICD-10-CM

## 2023-10-27 DIAGNOSIS — I12.9 HYPERTENSION, RENAL: ICD-10-CM

## 2023-10-27 DIAGNOSIS — I70.1 RENAL ARTERY STENOSIS (H): Primary | ICD-10-CM

## 2023-10-27 LAB
ANION GAP SERPL CALCULATED.3IONS-SCNC: 12 MMOL/L (ref 7–15)
APTT PPP: 31 SECONDS (ref 22–38)
BUN SERPL-MCNC: 17.8 MG/DL (ref 6–20)
CALCIUM SERPL-MCNC: 9.5 MG/DL (ref 8.6–10)
CHLORIDE SERPL-SCNC: 103 MMOL/L (ref 98–107)
CREAT SERPL-MCNC: 0.77 MG/DL (ref 0.51–0.95)
DEPRECATED HCO3 PLAS-SCNC: 23 MMOL/L (ref 22–29)
EGFRCR SERPLBLD CKD-EPI 2021: >90 ML/MIN/1.73M2
ERYTHROCYTE [DISTWIDTH] IN BLOOD BY AUTOMATED COUNT: 12.6 % (ref 10–15)
GLUCOSE SERPL-MCNC: 113 MG/DL (ref 70–99)
HCT VFR BLD AUTO: 38.6 % (ref 35–47)
HGB BLD-MCNC: 12.9 G/DL (ref 11.7–15.7)
INR PPP: 1 (ref 0.85–1.15)
MCH RBC QN AUTO: 28.7 PG (ref 26.5–33)
MCHC RBC AUTO-ENTMCNC: 33.4 G/DL (ref 31.5–36.5)
MCV RBC AUTO: 86 FL (ref 78–100)
PLATELET # BLD AUTO: 218 10E3/UL (ref 150–450)
POTASSIUM SERPL-SCNC: 4 MMOL/L (ref 3.4–5.3)
RBC # BLD AUTO: 4.5 10E6/UL (ref 3.8–5.2)
SODIUM SERPL-SCNC: 138 MMOL/L (ref 135–145)
WBC # BLD AUTO: 10.4 10E3/UL (ref 4–11)

## 2023-10-27 PROCEDURE — 250N000011 HC RX IP 250 OP 636: Mod: JZ | Performed by: RADIOLOGY

## 2023-10-27 PROCEDURE — 250N000011 HC RX IP 250 OP 636: Performed by: PHYSICIAN ASSISTANT

## 2023-10-27 PROCEDURE — C1769 GUIDE WIRE: HCPCS

## 2023-10-27 PROCEDURE — 250N000009 HC RX 250: Performed by: PHYSICIAN ASSISTANT

## 2023-10-27 PROCEDURE — 272N000302 HC DEVICE INFLATION CR5

## 2023-10-27 PROCEDURE — 272N000570 HC SHEATH CR7

## 2023-10-27 PROCEDURE — 80048 BASIC METABOLIC PNL TOTAL CA: CPT | Performed by: RADIOLOGY

## 2023-10-27 PROCEDURE — 85730 THROMBOPLASTIN TIME PARTIAL: CPT | Performed by: RADIOLOGY

## 2023-10-27 PROCEDURE — 258N000003 HC RX IP 258 OP 636: Performed by: RADIOLOGY

## 2023-10-27 PROCEDURE — 272N000196 HC ACCESSORY CR5

## 2023-10-27 PROCEDURE — 85610 PROTHROMBIN TIME: CPT | Performed by: RADIOLOGY

## 2023-10-27 PROCEDURE — 85027 COMPLETE CBC AUTOMATED: CPT | Performed by: RADIOLOGY

## 2023-10-27 PROCEDURE — 272N000116 HC CATH CR1

## 2023-10-27 PROCEDURE — 255N000002 HC RX 255 OP 636: Mod: JZ | Performed by: RADIOLOGY

## 2023-10-27 PROCEDURE — 99152 MOD SED SAME PHYS/QHP 5/>YRS: CPT

## 2023-10-27 PROCEDURE — C1760 CLOSURE DEV, VASC: HCPCS

## 2023-10-27 PROCEDURE — 272N000566 HC SHEATH CR3

## 2023-10-27 PROCEDURE — 36415 COLL VENOUS BLD VENIPUNCTURE: CPT | Performed by: RADIOLOGY

## 2023-10-27 RX ORDER — NALOXONE HYDROCHLORIDE 0.4 MG/ML
0.2 INJECTION, SOLUTION INTRAMUSCULAR; INTRAVENOUS; SUBCUTANEOUS
Status: DISCONTINUED | OUTPATIENT
Start: 2023-10-27 | End: 2023-10-28 | Stop reason: HOSPADM

## 2023-10-27 RX ORDER — HEPARIN SODIUM 1000 [USP'U]/ML
500-6000 INJECTION, SOLUTION INTRAVENOUS; SUBCUTANEOUS
Status: DISCONTINUED | OUTPATIENT
Start: 2023-10-27 | End: 2023-10-28 | Stop reason: HOSPADM

## 2023-10-27 RX ORDER — FENTANYL CITRATE 50 UG/ML
25-50 INJECTION, SOLUTION INTRAMUSCULAR; INTRAVENOUS EVERY 5 MIN PRN
Status: DISCONTINUED | OUTPATIENT
Start: 2023-10-27 | End: 2023-10-28 | Stop reason: HOSPADM

## 2023-10-27 RX ORDER — ONDANSETRON 2 MG/ML
4 INJECTION INTRAMUSCULAR; INTRAVENOUS ONCE
Status: COMPLETED | OUTPATIENT
Start: 2023-10-27 | End: 2023-10-27

## 2023-10-27 RX ORDER — HEPARIN SODIUM 200 [USP'U]/100ML
1 INJECTION, SOLUTION INTRAVENOUS CONTINUOUS PRN
Status: DISCONTINUED | OUTPATIENT
Start: 2023-10-27 | End: 2023-10-28 | Stop reason: HOSPADM

## 2023-10-27 RX ORDER — NALOXONE HYDROCHLORIDE 0.4 MG/ML
0.4 INJECTION, SOLUTION INTRAMUSCULAR; INTRAVENOUS; SUBCUTANEOUS
Status: DISCONTINUED | OUTPATIENT
Start: 2023-10-27 | End: 2023-10-28 | Stop reason: HOSPADM

## 2023-10-27 RX ORDER — FLUMAZENIL 0.1 MG/ML
0.2 INJECTION, SOLUTION INTRAVENOUS
Status: DISCONTINUED | OUTPATIENT
Start: 2023-10-27 | End: 2023-10-28 | Stop reason: HOSPADM

## 2023-10-27 RX ORDER — ONDANSETRON 2 MG/ML
4 INJECTION INTRAMUSCULAR; INTRAVENOUS EVERY 6 HOURS PRN
Status: DISCONTINUED | OUTPATIENT
Start: 2023-10-27 | End: 2023-10-27 | Stop reason: HOSPADM

## 2023-10-27 RX ORDER — SODIUM CHLORIDE 9 MG/ML
INJECTION, SOLUTION INTRAVENOUS CONTINUOUS
Status: DISCONTINUED | OUTPATIENT
Start: 2023-10-27 | End: 2023-10-27 | Stop reason: HOSPADM

## 2023-10-27 RX ORDER — IOPAMIDOL 612 MG/ML
100 INJECTION, SOLUTION INTRAVASCULAR ONCE
Status: COMPLETED | OUTPATIENT
Start: 2023-10-27 | End: 2023-10-27

## 2023-10-27 RX ORDER — HEPARIN SODIUM 1000 [USP'U]/ML
500-6000 INJECTION, SOLUTION INTRAVENOUS; SUBCUTANEOUS
Status: COMPLETED | OUTPATIENT
Start: 2023-10-27 | End: 2023-10-27

## 2023-10-27 RX ORDER — LIDOCAINE 40 MG/G
CREAM TOPICAL
Status: DISCONTINUED | OUTPATIENT
Start: 2023-10-27 | End: 2023-10-27 | Stop reason: HOSPADM

## 2023-10-27 RX ADMIN — FENTANYL CITRATE 50 MCG: 50 INJECTION, SOLUTION INTRAMUSCULAR; INTRAVENOUS at 08:37

## 2023-10-27 RX ADMIN — IOPAMIDOL 70 ML: 612 INJECTION, SOLUTION INTRAVENOUS at 09:06

## 2023-10-27 RX ADMIN — SODIUM CHLORIDE: 9 INJECTION, SOLUTION INTRAVENOUS at 07:02

## 2023-10-27 RX ADMIN — FENTANYL CITRATE 50 MCG: 50 INJECTION, SOLUTION INTRAMUSCULAR; INTRAVENOUS at 08:42

## 2023-10-27 RX ADMIN — LIDOCAINE HYDROCHLORIDE 10 ML: 10 INJECTION, SOLUTION INFILTRATION; PERINEURAL at 08:39

## 2023-10-27 RX ADMIN — ONDANSETRON 4 MG: 2 INJECTION INTRAMUSCULAR; INTRAVENOUS at 08:25

## 2023-10-27 RX ADMIN — MIDAZOLAM 2 MG: 1 INJECTION INTRAMUSCULAR; INTRAVENOUS at 08:37

## 2023-10-27 RX ADMIN — FENTANYL CITRATE 25 MCG: 50 INJECTION, SOLUTION INTRAMUSCULAR; INTRAVENOUS at 08:53

## 2023-10-27 RX ADMIN — HEPARIN SODIUM 4 BAG: 200 INJECTION, SOLUTION INTRAVENOUS at 08:28

## 2023-10-27 RX ADMIN — HEPARIN SODIUM 4000 UNITS: 1000 INJECTION INTRAVENOUS; SUBCUTANEOUS at 08:42

## 2023-10-27 RX ADMIN — MIDAZOLAM 1 MG: 1 INJECTION INTRAMUSCULAR; INTRAVENOUS at 08:42

## 2023-10-27 RX ADMIN — MIDAZOLAM 1 MG: 1 INJECTION INTRAMUSCULAR; INTRAVENOUS at 08:52

## 2023-10-27 ASSESSMENT — ACTIVITIES OF DAILY LIVING (ADL)
ADLS_ACUITY_SCORE: 35

## 2023-10-27 NOTE — DISCHARGE INSTRUCTIONS
Renal Angiogram Discharge Instructions     After you go home:    Have an adult stay with you until tomorrow.  Drink extra fluids for 2 days.  You may resume your normal diet.  No smoking       For 24 hours - due to the sedation you received:  Relax and take it easy.  Do NOT make any important or legal decisions.  Do NOT drive or operate machines at home or at work.  Do NOT drink alcohol.    Care of Groin Puncture Site:    For the first 24 hrs - check the puncture site every 1-2 hours while awake.  For 2 days, when you cough, sneeze, laugh or move your bowels, hold your hand over the puncture site and press firmly.  Remove the bandaid after 24 hours. If there is minor oozing, apply another bandaid and remove it after 12 hours.  It is normal to have a small bruise or pea size lump at the site.  You may shower tomorrow. Do NOT take a bath, or use a hot tub or pool for at least 3 days. Do NOT scrub the site. Do not use lotion or powder near the puncture site.     Activity:            For 2 days:  No stooping or squatting  Do NOT do any heavy activity such as exercise, lifting, or straining.   No housework, yard work or any activity that make you sweat  Do NOT lift more than 10 pounds    Bleeding:    If you start bleeding from the site in your groin, lie down flat and press firmly on/above the site for 10 minutes.   Once bleeding stops, lay flat for 2 hours.   Call the [facility] as soon as you can.       Call 911 right away if you have heavy bleeding or bleeding that does not stop.      Medicines:    If you are on Metformin (Glucophage) and your GFR (kidney function level) is >30, you may continue taking your Metformin.  If you are on Metformin (Glucophage) and your GFR (kidney function level) is <30, do not restart the Metformin for 48 hours after your procedure. Check with your primary care giver before restarting the Metformin to see if you need to have blood drawn to recheck your kidney function  (GFR).  If you are taking an antiplatelet medication such as Plavix, do not stop taking it until you talk to your provider.     Take your medications, including blood thinners, unless your provider tells you not to.    If you take Coumadin (Warfarin), have your INR checked by your provider in  3-5 days. Call your clinic to schedule this.  If you have stopped any medicines, check with your provider about when to restart them.    Follow Up Appointments:    Follow up with [facility] as directed.    Call the clinic if:    You have increased pain or a large or growing hard lump around the site.  The site is red, swollen, hot or tender.  Blood or fluid is draining from the site.  You have chills or a fever greater than 101 F (38 C).  Your leg feels numb, cool or changes color.  You have hives, a rash or unusual itching.  New pain in the back or belly that you cannot control with Tylenol.  Any questions or concerns.    Other Instructions:    If you received a stent - carry your stent card with you at all times.      If you have questions or your original symptoms do not improve, call:         [facility] @ [phone]

## 2023-10-27 NOTE — PROGRESS NOTES
Care Suites Post Procedure Note    Patient Information  Name: Leah Holloway  Age: 38 year old    Post Procedure  Time patient returned to Care Suites: 0915  Concerns/abnormal assessment: none  If abnormal assessment, provider notified: N/A  Plan/Other: discharge this afternoon     Amaris West RN

## 2023-10-27 NOTE — PROGRESS NOTES
Care Suites Discharge Nursing Note    Patient Information  Name: Leah Holloway  Age: 38 year old    Discharge Education:  Discharge instructions reviewed: Yes  Additional education/resources provided: no  Patient/patient representative verbalizes understanding: Yes  Patient discharging on new medications: No  Medication education completed: N/A    Discharge Plans:   Discharge location: home  Discharge ride contacted: Yes  Approximate discharge time: 1245    Discharge Criteria:  Discharge criteria met and vital signs stable: Yes    Patient Belongs:  Patient belongings returned to patient: Yes    Amaris West RN

## 2023-10-27 NOTE — IR NOTE
Patient Name: Leah Holloway  Medical Record Number: [id number]  Today's Date: 10/27/2023    Procedure: Bilateral Renal Angiogram  Proceduralist: Dr. Le  Pathology present: no    Procedure Start: 0835  Procedure end: 0907  Sedation medications administered: Last Dose: 0853, Fentanyl 125 mcg, Versed 4 mg, Lidocaine 10 ml's.    Report given to: MICHELLE Glez RN  : no    Other Notes: Pt will require 3 bedrest post procedure. Pt arrived to IR room 1 from cs 11. Consent reviewed. Pt denies any questions or concerns regarding procedure. Pt positioned supine and monitored per protocol. Pt tolerated procedure without any noted complications.

## 2023-10-27 NOTE — PROGRESS NOTES
Consent amended to add right AND left renal artery angiogram. Documentation was done in the patient room and she is aware.     Thanks, [facility] Interventional Radiology CNP ([phone]) (phone [phone])

## 2023-10-27 NOTE — PRE-PROCEDURE
GENERAL PRE-PROCEDURE:   Procedure:  Right renal angiogram with possible angioplasty with moderate sedation  Date/Time:  10/27/2023 7:47 AM    Written consent obtained?: Yes    Risks and benefits: Risks, benefits and alternatives were discussed    Consent given by:  Patient  Patient states understanding of procedure being performed: Yes    Patient's understanding of procedure matches consent: Yes    Procedure consent matches procedure scheduled: Yes    Expected level of sedation:  Moderate  Appropriately NPO:  Yes  ASA Class:  3  Mallampati  :  Grade 1- soft palate, uvula, tonsillar pillars, and posterior pharyngeal wall visible  Lungs:  Lungs clear with good breath sounds bilaterally and other (comment)  Lung exam comment:  Decreased in bases  Heart:  Normal heart sounds and rate and systolic murmur  History & Physical reviewed:  History and physical reviewed and no updates needed  Statement of review:  I have reviewed the lab findings, diagnostic data, medications, and the plan for sedation    Total time: 25 minutes    Thanks [facility] Interventional Radiology CNP ([phone]) (phone [phone])

## 2023-11-01 ENCOUNTER — TELEPHONE (OUTPATIENT)
Dept: NEPHROLOGY | Facility: CLINIC | Age: 38
End: 2023-11-01
Payer: COMMERCIAL

## 2023-11-01 NOTE — TELEPHONE ENCOUNTER
Attempted to schedule 3 month follow appt from 7/17 visit, unable to reach pt at this time, lvm with writer's direct line for call back.  Jenifer Shrestha,  Nephrology Clinic Navigator  [facility]  Direct: [phone].

## 2023-11-15 ENCOUNTER — LAB (OUTPATIENT)
Dept: LAB | Facility: CLINIC | Age: 38
End: 2023-11-15
Payer: COMMERCIAL

## 2023-11-15 DIAGNOSIS — I12.9 HYPERTENSION, RENAL: ICD-10-CM

## 2023-11-15 LAB
ANION GAP SERPL CALCULATED.3IONS-SCNC: 10 MMOL/L (ref 7–15)
BUN SERPL-MCNC: 12.4 MG/DL (ref 6–20)
CALCIUM SERPL-MCNC: 8.9 MG/DL (ref 8.6–10)
CHLORIDE SERPL-SCNC: 108 MMOL/L (ref 98–107)
CREAT SERPL-MCNC: 0.8 MG/DL (ref 0.51–0.95)
DEPRECATED HCO3 PLAS-SCNC: 24 MMOL/L (ref 22–29)
EGFRCR SERPLBLD CKD-EPI 2021: >90 ML/MIN/1.73M2
GLUCOSE SERPL-MCNC: 118 MG/DL (ref 70–99)
POTASSIUM SERPL-SCNC: 3.4 MMOL/L (ref 3.4–5.3)
SODIUM SERPL-SCNC: 142 MMOL/L (ref 135–145)

## 2023-11-15 PROCEDURE — 80048 BASIC METABOLIC PNL TOTAL CA: CPT

## 2023-11-15 PROCEDURE — 36415 COLL VENOUS BLD VENIPUNCTURE: CPT

## 2023-11-20 ENCOUNTER — VIRTUAL VISIT (OUTPATIENT)
Dept: OTHER | Facility: CLINIC | Age: 38
End: 2023-11-20
Attending: INTERNAL MEDICINE
Payer: COMMERCIAL

## 2023-11-20 DIAGNOSIS — I77.3 FIBROMUSCULAR DYSPLASIA (H): Primary | ICD-10-CM

## 2023-11-20 DIAGNOSIS — I12.9 HYPERTENSION, RENAL: ICD-10-CM

## 2023-11-20 PROCEDURE — 99443 PR PHYSICIAN TELEPHONE EVALUATION 21-30 MIN: CPT | Performed by: INTERNAL MEDICINE

## 2023-11-20 RX ORDER — ISOSORBIDE MONONITRATE 30 MG/1
30 TABLET, EXTENDED RELEASE ORAL DAILY
Qty: 30 TABLET | Refills: 11 | Status: SHIPPED | OUTPATIENT
Start: 2023-11-20

## 2023-11-20 NOTE — PROGRESS NOTES
Shawnee is a 38 year old who is being evaluated via a billable telephone visit.      What phone number would you like to be contacted at?   [phone]        How would you like to obtain your AVS? Laurie Banks MA

## 2023-11-20 NOTE — PROGRESS NOTES
HPI: Leah Holloway is an unmarried 37 year old female who presents for initial Vascular Medicine due to her her uncontrolled hypertension. The patient has a history of hypertension since at least 2008  secondary to right renal artery stenosis for which she underwent right midrenal artery 5 mm angioplasty for stenosis related to fibromuscular dysplasia in the [facility] on 7/17/2008. She also has a h/o severe obesity (Body mass index is 42.24 kg/m . ) and is constantly fatigued. She is unmarried and therefore unaware of whether or not she has apnea.      A CTA done on 7/18/23 showed changes consistent with fibromuscular dysplasia involving the larger of two right renal arteries.     During the last year her BP has been uncontrollable. She either does not tolerate or was not controlled on multiple meds including ace inhibitors, beta blockers, diuretics,  and high dose calcium channel blockers.      She gained at least 15 lbs over the past nine months and has had severe headaches during the summer for which she has been taking intermittently ibuprofen and excedrin. She presented on 7/15 to the ED with hypertensive emergency and hydrochlorothiazide 25 mg daily was added to her regimen. Her BP has since then improved a little bit, but her K dropped and she was switched to aldactone. On this she felt foggy and stopped taking it. She did not resume the diuretic.      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 Despite this she is still obese as she eats nutritionally poor quality fast food.      Her maternal grandmother has hypertension and her father started having hypertension at the age of 55.                 Review Of Systems  General: constant fatigue  Skin: negative  Eyes: negative  Ears/Nose/Throat: negative  Respiratory: No shortness of breath, dyspnea on exertion, cough, or hemoptysis  Cardiovascular: negative  Gastrointestinal:  negative  Genitourinary: negative  Musculoskeletal: negative  Neurologic: negative  Psychiatric: negative  Hematologic/Lymphatic/Immunologic: negative  Endocrine: negative        PAST MEDICAL HISTORY:                  Past Medical History   No past medical history on file.        PAST SURGICAL HISTORY:                  Past Surgical History         Past Surgical History:   Procedure Laterality Date    ARTHROSCOPY KNEE Right 1/22/2016     Procedure: ARTHROSCOPY KNEE;  Surgeon: Ian Jaimes MD;  Location: [facility] OR    GENITOURINARY SURGERY        HC KNEE SCOPE,PART SYNOVECT Right 1/22/16     Medial plica excision - WORK COMP            CURRENT MEDICATIONS:                  Current Outpatient Prescriptions          Current Outpatient Medications   Medication Sig Dispense Refill    amLODIPine (NORVASC) 5 MG tablet Take 1 tablet (5 mg) by mouth daily 90 tablet 3    BP MONITOR-STETHOSCOPE KIT test as directed 1 kit 0    FLONASE 50 MCG/ACT NA SUSP USE 2 SPRAYS IN EACH NOSTRIL ONCE DAILY 3 Bottle 3    hydrochlorothiazide (HYDRODIURIL) 25 MG tablet Take 1 tablet (25 mg) by mouth daily 30 tablet 3    potassium chloride ER (KLOR-CON M) 10 MEQ CR tablet Take 1 tablet (10 mEq) by mouth 2 times daily 30 tablet 3    telmisartan (MICARDIS) 80 MG tablet Take 1 tablet (80 mg) by mouth daily 120 tablet 3            ALLERGIES:                        Allergies   Allergen Reactions    Aluminum Hives       Reaction when pt comes into contact with aluminum.    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 mgr.    Tobacco Use    Smoking status: Never     Smokeless tobacco: Never    Tobacco comments:       No smokers in home.   Vaping Use    Vaping Use: Never used   Substance and Sexual Activity    Alcohol use: No    Drug use: No    Sexual activity: Never   Other Topics Concern    Parent/sibling w/ CABG, MI or angioplasty before 65F 55M? Not Asked   Social History Narrative    Not on file      Social Determinants of Health      Financial Resource Strain: Not on file   Food Insecurity: Not on file   Transportation Needs: Not on file   Physical Activity: Not on file   Stress: Not on file   Social Connections: Not on file   Interpersonal Safety: Not on file   Housing Stability: Not on file            FAMILY HISTORY:                   Family History         Family History   Problem Relation Age of Onset    Coronary Artery Disease No family hx of      Colon Cancer No family hx of      Mental Illness No family hx of      Obesity No family hx of      Osteoporosis No family hx of      Depression No family hx of      Cerebrovascular Disease No family hx of                 Physical exam Reveals:     O/P: WNL  HEENT: WNL  NECK: No JVD, thyromegaly, or lymphadenopathy  HEART: RRR, no murmurs, gallops, or rubs  LUNGS: CTA bilaterally without rales, wheezes, or rhonchi  GI: NABS, nondistended, nontender, soft  EXT:without cyanosis, clubbing, or edema  NEURO: nonfocal  : no flank tenderness     CTA RENAL WITH CONTRAST  7/18/2023 4:00 PM      HISTORY: Fibromuscular dysplasia.     COMPARISON: CT angiogram of the renal arteries dated 7/11/2022.     TECHNIQUE: CT angiogram of the renal arteries was obtained following  the administration of 80 mL intravenous contrast. Images are reviewed  in multiple planes and 3-D reconstructions were also performed.  Radiation dose for this scan was reduced using automated exposure  control, adjustment of the mA and/or kV according to patient size, or  iterative reconstruction technique.     FINDINGS:   Vascular exam:  Abdominal aorta: The  abdominal aorta is of normal caliber without  aneurysmal dilatation or significant stenosis.  Renal arteries: There are two right renal arteries and one left renal  artery. There is a beaded appearance of the larger of the two right  renal arteries at its mid and distal aspects most consistent with  fibromuscular dysplasia. The left renal artery appears grossly  unremarkable.     The celiac trunk, superior mesenteric artery, and inferior mesenteric  artery are patent without significant stenoses. The proximal iliac  arteries are patent without significant stenoses.     Soft tissue exam: The lung bases are clear.     The visualized solid organs in the abdomen are unremarkable.     The visualized bowel is unremarkable. There is a small periumbilical  hernia containing mesenteric fat.                                                                      IMPRESSION: Changes most consistent with fibromuscular dysplasia  involving the larger of two right renal arteries.     RADHA POON MD             IR RENAL ANGIOGRAM BILATERAL  10/27/2023 9:03 AM      HISTORY: 37-year-old female with a history of fibromuscular dysplasia  and uncontrolled hypertension. Patient previously underwent  angioplasty of the mid right renal artery with improvement in  hypertension symptoms.     COMPARISON: CT angiogram of the abdomen/pelvis dated 7/18/2023.     DESCRIPTION OF PROCEDURE: After obtaining informed consent, the  patient was placed in a supine position on the fluoroscopy table. The  right groin was prepped and draped in the usual sterile manner. 1%  lidocaine was injected for local anesthesia. Ultrasound was used to  evaluate and document patency of the right common femoral artery.  Under sterile ultrasound guidance, access into the right common  femoral artery was obtained. An image was saved for documentation. A 6  Khmer vascular sheath was placed. A pigtail catheter was positioned  in the juxtarenal abdominal aorta for  aortography. A SOS catheter was  used to select the dominant of two right renal arteries for selective  right renal artery angiography. A right femoral angiogram was  performed through a right femoral sheath.     FINDINGS: There was a beaded appearance of the mid right renal artery  consistent with FMD contributing to a mild to moderate stenosis. The  left renal artery was patent. There was a beaded appearance of the  distal left renal artery consistent with FMD. No definite significant  stenosis in the left renal artery was identified. There were mild  weblike stenoses in the distal right external iliac artery and  proximal right common femoral artery at its junction with the external  iliac artery consistent with FMD.     INTERVENTION: A 5 [place] angiographic catheter was passed into the  distal right renal artery. A Bazan wire was placed. Over the Bazan  wire, a 6 [place]uran crossover sheath was placed to the origin of the  right renal artery. The mid right renal artery was then angioplastied  utilizing a 5 mm followed by 6 mm balloon. Follow-up angiogram showed  slight improvement in luminal diameter and improved flow.     The right femoral sheath was then removed. The puncture site was  closed with a 6 [place] Angio-Seal.     I determined this patient to be an appropriate candidate for the  planned sedation and procedure and reassessed the patient immediately  prior to sedation and procedure. Moderate intravenous conscious  sedation was supervised by me. The patient was independently monitored  by a registered nurse assigned to the department of radiology using  automated blood pressure, EKG and pulse oximetry. The patient  tolerated the procedure well. There were no immediate postprocedure  complications. The patient's vital signs were monitored by radiology  nursing staff under my supervision and remained stable throughout the  study. Radiation dose for this scan was reduced using automated  exposure control,  adjustment of the mA and/or kV according to patient  size, or iterative reconstruction technique.     MEDICATIONS: 4 mg Versed, 125 mcg fentanyl, heparin 4000 units, Zofran  4 mg.     Sedation time: 32 minutes.     Total fluoroscopy dose: 780.19 mGy Air Kerma.     Radiation dose: 5.5 minutes.     Contrast: 70 cc Isovue.                                                                      IMPRESSION: FMD changes in the right and left renal arteries as above.  Mild to moderate stenosis in the mid right renal artery was  angioplastied.     In addition, there are FMD changes in the right external iliac artery  and proximal common femoral artery.     RADHA POON MD            A/P:        (I77.3) Fibromuscular dysplasia of larger of two right renal arteries.(H24)  (primary encounter diagnosis)  Comment: We do not know if she has carotid or vertebral FMD. She has been poorly controlled form a htn perspective during the past year while on a three drug regimen including a diuretic. She was not  on one when previously seen, but her history of not responding form a htn perspective while on a diuretic qualified her for renal artery angioplasty. On 10/27/23 she underwent bilateral RA PTA. Since then her BP has been 115-128/75-90. IN the last week it has been 140/102.  .  Plan: See below. RTC one month for a virtual BP check.              (I12.9) Hypertension, renal  Comment: See above. Resume diuretic with potassium supplementation. Not at goal. RTC one month for a virtual BP check.   Plan: Continue hydrochlorothiazide         (HYDRODIURIL) 25 MG tablet, potassium chloride         ER (KLOR-CON M) 10 MEQ CR tablet, amlodipine (NORVASC) 5 MG tablet, Micardis 80 mg daily. Add isosorbide 30 mg daily.                 (R53.83) Other fatigue  Comment: Her STOP-BANG score is 4. She has nonrestorative sleep and is always fatigued. Obtain a sleep study.  Plan: Sleep Study Referral       22 minutes total medical care on today's  date.    MD location: office  Pt location: home    Phone call start: 15:49  Phone call end: 16:11

## 2023-11-21 ENCOUNTER — TELEPHONE (OUTPATIENT)
Dept: OTHER | Facility: CLINIC | Age: 38
End: 2023-11-21
Payer: COMMERCIAL

## 2023-11-22 NOTE — TELEPHONE ENCOUNTER
Future Appointments   Date Time Provider Department Center   12/18/2023  4:50 PM Darrell Hadley MD [facility]

## 2023-12-18 ENCOUNTER — VIRTUAL VISIT (OUTPATIENT)
Dept: OTHER | Facility: CLINIC | Age: 38
End: 2023-12-18
Attending: INTERNAL MEDICINE
Payer: COMMERCIAL

## 2023-12-18 DIAGNOSIS — I12.9 HYPERTENSION, RENAL: ICD-10-CM

## 2023-12-18 DIAGNOSIS — I77.3 FIBROMUSCULAR DYSPLASIA (H): Primary | ICD-10-CM

## 2023-12-18 PROCEDURE — 99443 PR PHYSICIAN TELEPHONE EVALUATION 21-30 MIN: CPT | Performed by: INTERNAL MEDICINE

## 2023-12-18 NOTE — PROGRESS NOTES
Shawnee is a 38 year old who is being evaluated via a billable telephone visit.      What phone number would you like to be contacted at? [phone]  How would you like to obtain your AVS? [place]    Distant Location (provider location):  On-site      Objective           Vitals:  No vitals were obtained today due to virtual visit.    KRUPA HOLMAN

## 2023-12-18 NOTE — PROGRESS NOTES
HPI: Leah Holloway is an unmarried 37 year old female who presents for initial Vascular Medicine due to her her uncontrolled hypertension. The patient has a history of hypertension since at least 2008  secondary to right renal artery stenosis for which she underwent right midrenal artery 5 mm angioplasty for stenosis related to fibromuscular dysplasia in the [facility] on 7/17/2008. She also has a h/o severe obesity (Body mass index is 42.24 kg/m . ) and is constantly fatigued. She is unmarried and therefore unaware of whether or not she has apnea.      A CTA done on 7/18/23 showed changes consistent with fibromuscular dysplasia involving the larger of two right renal arteries.     During the last year her BP has been uncontrollable. She either does not tolerate or was not controlled on multiple meds including ace inhibitors, beta blockers, diuretics,  and high dose calcium channel blockers.      She gained at least 15 lbs over the past nine months and has had severe headaches during the summer for which she has been taking intermittently ibuprofen and excedrin. She presented on 7/15 to the ED with hypertensive emergency and hydrochlorothiazide 25 mg daily was added to her regimen. Her BP has since then improved a little bit, but her K dropped and she was switched to aldactone. On this she felt foggy and stopped taking it. She did not resume the diuretic.      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 Despite this she is still obese as she eats nutritionally poor quality fast food.      Her maternal grandmother has hypertension and her father started having hypertension at the age of 55.                 Review Of Systems  General: constant fatigue  Skin: negative  Eyes: negative  Ears/Nose/Throat: negative  Respiratory: No shortness of breath, dyspnea on exertion, cough, or hemoptysis  Cardiovascular: negative  Gastrointestinal:  negative  Genitourinary: negative  Musculoskeletal: negative  Neurologic: negative  Psychiatric: negative  Hematologic/Lymphatic/Immunologic: negative  Endocrine: negative        PAST MEDICAL HISTORY:                  Past Medical History   No past medical history on file.         PAST SURGICAL HISTORY:                  Past Surgical History             Past Surgical History:   Procedure Laterality Date    ARTHROSCOPY KNEE Right 1/22/2016     Procedure: ARTHROSCOPY KNEE;  Surgeon: Ian Jaimes MD;  Location: [facility] OR    GENITOURINARY SURGERY        HC KNEE SCOPE,PART SYNOVECT Right 1/22/16     Medial plica excision - WORK COMP            CURRENT MEDICATIONS:                  Current Outpatient Prescriptions               Current Outpatient Medications   Medication Sig Dispense Refill    amLODIPine (NORVASC) 5 MG tablet Take 1 tablet (5 mg) by mouth daily 90 tablet 3    BP MONITOR-STETHOSCOPE KIT test as directed 1 kit 0    FLONASE 50 MCG/ACT NA SUSP USE 2 SPRAYS IN EACH NOSTRIL ONCE DAILY 3 Bottle 3    hydrochlorothiazide (HYDRODIURIL) 25 MG tablet Take 1 tablet (25 mg) by mouth daily 30 tablet 3    potassium chloride ER (KLOR-CON M) 10 MEQ CR tablet Take 1 tablet (10 mEq) by mouth 2 times daily 30 tablet 3    telmisartan (MICARDIS) 80 MG tablet Take 1 tablet (80 mg) by mouth daily 120 tablet 3            ALLERGIES:                            Allergies   Allergen Reactions    Aluminum Hives       Reaction when pt comes into contact with aluminum.    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 mgr.    Tobacco Use     Smoking status: Never    Smokeless tobacco: Never    Tobacco comments:       No smokers in home.   Vaping Use    Vaping Use: Never used   Substance and Sexual Activity    Alcohol use: No    Drug use: No    Sexual activity: Never   Other Topics Concern    Parent/sibling w/ CABG, MI or angioplasty before 65F 55M? Not Asked   Social History Narrative    Not on file      Social Determinants of Health      Financial Resource Strain: Not on file   Food Insecurity: Not on file   Transportation Needs: Not on file   Physical Activity: Not on file   Stress: Not on file   Social Connections: Not on file   Interpersonal Safety: Not on file   Housing Stability: Not on file            FAMILY HISTORY:                   Family History             Family History   Problem Relation Age of Onset    Coronary Artery Disease No family hx of      Colon Cancer No family hx of      Mental Illness No family hx of      Obesity No family hx of      Osteoporosis No family hx of      Depression No family hx of      Cerebrovascular Disease No family hx of                 Physical exam Reveals:     O/P: WNL  HEENT: WNL  NECK: No JVD, thyromegaly, or lymphadenopathy  HEART: RRR, no murmurs, gallops, or rubs  LUNGS: CTA bilaterally without rales, wheezes, or rhonchi  GI: NABS, nondistended, nontender, soft  EXT:without cyanosis, clubbing, or edema  NEURO: nonfocal  : no flank tenderness     CTA RENAL WITH CONTRAST  7/18/2023 4:00 PM      HISTORY: Fibromuscular dysplasia.     COMPARISON: CT angiogram of the renal arteries dated 7/11/2022.     TECHNIQUE: CT angiogram of the renal arteries was obtained following  the administration of 80 mL intravenous contrast. Images are reviewed  in multiple planes and 3-D reconstructions were also performed.  Radiation dose for this scan was reduced using automated exposure  control, adjustment of the mA and/or kV according to patient size, or  iterative reconstruction technique.     FINDINGS:   Vascular  exam:  Abdominal aorta: The abdominal aorta is of normal caliber without  aneurysmal dilatation or significant stenosis.  Renal arteries: There are two right renal arteries and one left renal  artery. There is a beaded appearance of the larger of the two right  renal arteries at its mid and distal aspects most consistent with  fibromuscular dysplasia. The left renal artery appears grossly  unremarkable.     The celiac trunk, superior mesenteric artery, and inferior mesenteric  artery are patent without significant stenoses. The proximal iliac  arteries are patent without significant stenoses.     Soft tissue exam: The lung bases are clear.     The visualized solid organs in the abdomen are unremarkable.     The visualized bowel is unremarkable. There is a small periumbilical  hernia containing mesenteric fat.                                                                      IMPRESSION: Changes most consistent with fibromuscular dysplasia  involving the larger of two right renal arteries.     RADHA POON MD               IR RENAL ANGIOGRAM BILATERAL  10/27/2023 9:03 AM      HISTORY: 37-year-old female with a history of fibromuscular dysplasia  and uncontrolled hypertension. Patient previously underwent  angioplasty of the mid right renal artery with improvement in  hypertension symptoms.     COMPARISON: CT angiogram of the abdomen/pelvis dated 7/18/2023.     DESCRIPTION OF PROCEDURE: After obtaining informed consent, the  patient was placed in a supine position on the fluoroscopy table. The  right groin was prepped and draped in the usual sterile manner. 1%  lidocaine was injected for local anesthesia. Ultrasound was used to  evaluate and document patency of the right common femoral artery.  Under sterile ultrasound guidance, access into the right common  femoral artery was obtained. An image was saved for documentation. A 6  Cuban vascular sheath was placed. A pigtail catheter was positioned  in the  juxtarenal abdominal aorta for aortography. A SOS catheter was  used to select the dominant of two right renal arteries for selective  right renal artery angiography. A right femoral angiogram was  performed through a right femoral sheath.     FINDINGS: There was a beaded appearance of the mid right renal artery  consistent with FMD contributing to a mild to moderate stenosis. The  left renal artery was patent. There was a beaded appearance of the  distal left renal artery consistent with FMD. No definite significant  stenosis in the left renal artery was identified. There were mild  weblike stenoses in the distal right external iliac artery and  proximal right common femoral artery at its junction with the external  iliac artery consistent with FMD.     INTERVENTION: A 5 Gabonese angiographic catheter was passed into the  distal right renal artery. A Bazan wire was placed. Over the Bazan  wire, a 6 Gabonese crossover sheath was placed to the origin of the  right renal artery. The mid right renal artery was then angioplastied  utilizing a 5 mm followed by 6 mm balloon. Follow-up angiogram showed  slight improvement in luminal diameter and improved flow.     The right femoral sheath was then removed. The puncture site was  closed with a 6 Gabonese Angio-Seal.     I determined this patient to be an appropriate candidate for the  planned sedation and procedure and reassessed the patient immediately  prior to sedation and procedure. Moderate intravenous conscious  sedation was supervised by me. The patient was independently monitored  by a registered nurse assigned to the department of radiology using  automated blood pressure, EKG and pulse oximetry. The patient  tolerated the procedure well. There were no immediate postprocedure  complications. The patient's vital signs were monitored by radiology  nursing staff under my supervision and remained stable throughout the  study. Radiation dose for this scan was reduced using  automated  exposure control, adjustment of the mA and/or kV according to patient  size, or iterative reconstruction technique.     MEDICATIONS: 4 mg Versed, 125 mcg fentanyl, heparin 4000 units, Zofran  4 mg.     Sedation time: 32 minutes.     Total fluoroscopy dose: 780.19 mGy Air Kerma.     Radiation dose: 5.5 minutes.     Contrast: 70 cc Isovue.                                                                      IMPRESSION: FMD changes in the right and left renal arteries as above.  Mild to moderate stenosis in the mid right renal artery was  angioplastied.     In addition, there are FMD changes in the right external iliac artery  and proximal common femoral artery.     RADHA POON MD            A/P:        (I77.3) Fibromuscular dysplasia of larger of two right renal arteries.(H24)  (primary encounter diagnosis)  Comment: We do not know if she has carotid or vertebral FMD. She has been poorly controlled form a htn perspective during the past year while on a three drug regimen including a diuretic. She was not  on one when previously seen, but her history of not responding form a htn perspective while on a diuretic qualified her for renal artery angioplasty. On 10/27/23 she underwent bilateral RA PTA. Since then her BP has been 115-128/75-90. In the week of 11/13-20/2023 it had been 140/102. When seen on 11/20/23, we continued hydrochlorothiazide 25 mg daily,  potassium chloride 10 meq daily, amlodipine 5 mg daily, Micardis 80 mg daily, and added isosorbide 30 mg daily.  During the last month it has been 127/78. She has actually started to scale back on some meds and her BP has remained in the range referenced above.   Plan: See below. RTC three months for a virtual BP check.              (I12.9) Hypertension, renal  Comment: See above.  Plan: See above.                (R53.83) Other fatigue  Comment: Her STOP-BANG score is 4. She has nonrestorative sleep and is always fatigued. Obtain a sleep study.  Plan:  Await Sleep Study Referral.        22 minutes total medical care on today's date.     MD location: office  Pt location: home     Phone call start: 16:50  Phone call end: 17:11

## 2024-01-05 ENCOUNTER — HOSPITAL ENCOUNTER (EMERGENCY)
Facility: CLINIC | Age: 39
Discharge: HOME | End: 2024-01-05
Attending: FAMILY MEDICINE | Admitting: FAMILY MEDICINE
Payer: COMMERCIAL

## 2024-01-05 VITALS
TEMPERATURE: 98.1 F | WEIGHT: 282 LBS | HEART RATE: 79 BPM | HEIGHT: 70 IN | DIASTOLIC BLOOD PRESSURE: 108 MMHG | SYSTOLIC BLOOD PRESSURE: 157 MMHG | OXYGEN SATURATION: 99 % | BODY MASS INDEX: 40.37 KG/M2 | RESPIRATION RATE: 16 BRPM

## 2024-01-05 DIAGNOSIS — T78.40XA ALLERGIC REACTION TO DRUG, INITIAL ENCOUNTER: ICD-10-CM

## 2024-01-05 PROCEDURE — 99283 EMERGENCY DEPT VISIT LOW MDM: CPT | Performed by: FAMILY MEDICINE

## 2024-01-05 PROCEDURE — 250N000012 HC RX MED GY IP 250 OP 636 PS 637: Performed by: FAMILY MEDICINE

## 2024-01-05 PROCEDURE — 250N000013 HC RX MED GY IP 250 OP 250 PS 637: Performed by: FAMILY MEDICINE

## 2024-01-05 RX ORDER — CETIRIZINE HYDROCHLORIDE 10 MG/1
10 TABLET ORAL ONCE
Status: COMPLETED | OUTPATIENT
Start: 2024-01-05 | End: 2024-01-05

## 2024-01-05 RX ADMIN — DEXAMETHASONE 10 MG: 2 TABLET ORAL at 21:50

## 2024-01-05 RX ADMIN — CETIRIZINE HYDROCHLORIDE 10 MG: 10 TABLET, FILM COATED ORAL at 21:50

## 2024-01-06 NOTE — ED TRIAGE NOTES
States she is currently on day 2 of penicillin for double ear infection and noticed after dose today her ears, face, skin, neck feel hot and red, itching.  States throat feels funny but denies any swallowing difficulties or respiratory issues.

## 2024-01-06 NOTE — ED PROVIDER NOTES
History     Chief Complaint   Patient presents with    Allergic Reaction     HPI  Leah Holloway is a 38 year old female who presents with a possible reaction to penicillin that she was started on.  Patient is on day 2 for an ear infection.  Patient states about an hour after taking her dose this afternoon she started getting a rash on her face and feeling itchy all over.  She then developed a rash on her chest and then felt like her throat was getting kind of tight.  Denies any nausea or any vomiting.  Patient is unsure if she has had a reaction to penicillin before.  Denies any belly pain or chest pain or back pain.  Patient was put on the penicillin because of an ear infection, she states that her ears actually feel okay right now.    Allergies:  Allergies   Allergen Reactions    Aluminum Hives     Reaction when pt comes into contact with aluminum.    Penicillins Anaphylaxis    Benadryl [Diphenhydramine-Zinc Acetate]     Unknown [No Clinical Screening - See Comments] Rash     Adhesives       Problem List:    Patient Active Problem List    Diagnosis Date Noted    Morbid obesity (H) 07/06/2022     Priority: Medium    Plica of knee, right 01/28/2016     Priority: Medium    Renal artery stenosis (H24) 01/13/2016     Priority: Medium     Surgery in 2009      Chondromalacia patellae, right 09/10/2015     Priority: Medium    Synovitis of knee 08/13/2015     Priority: Medium    CARDIOVASCULAR SCREENING; LDL GOAL LESS THAN 160 10/31/2010     Priority: Medium    GERD (gastroesophageal reflux disease) 05/28/2008     Priority: Medium    Elevated blood pressure reading without diagnosis of hypertension 03/19/2008     Priority: Medium    Headache 03/19/2008     Priority: Medium     Problem list name updated by automated process. Provider to review      Syncope and collapse 03/19/2008     Priority: Medium        Past Medical History:    No past medical history on file.    Past Surgical History:    Past Surgical History:  "  Procedure Laterality Date    ARTHROSCOPY KNEE Right 1/22/2016    Procedure: ARTHROSCOPY KNEE;  Surgeon: Ian Jaimes MD;  Location: [facility] OR    GENITOURINARY SURGERY      HC KNEE SCOPE,PART SYNOVECT Right 1/22/16    Medial plica excision - WORK COMP    IR RENAL ANGIOGRAM BILATERAL  10/27/2023       Family History:    Family History   Problem Relation Age of Onset    Coronary Artery Disease No family hx of     Colon Cancer No family hx of     Mental Illness No family hx of     Obesity No family hx of     Osteoporosis No family hx of     Depression No family hx of     Cerebrovascular Disease No family hx of        Social History:  Marital Status:  Single [1]  Social History     Tobacco Use    Smoking status: Never    Smokeless tobacco: Never    Tobacco comments:     No smokers in home.   Vaping Use    Vaping Use: Never used   Substance Use Topics    Alcohol use: No    Drug use: No        Medications:    amLODIPine (NORVASC) 5 MG tablet  BP MONITOR-STETHOSCOPE KIT  FLONASE 50 MCG/ACT NA SUSP  hydrochlorothiazide (HYDRODIURIL) 25 MG tablet  isosorbide mononitrate (IMDUR) 30 MG 24 hr tablet  potassium chloride ER (KLOR-CON M) 10 MEQ CR tablet  telmisartan (MICARDIS) 80 MG tablet          Review of Systems   All other systems reviewed and are negative.      Physical Exam   BP: (!) 212/134  Pulse: 84  Temp: 98.1  F (36.7  C)  Resp: 20  Height: 177.8 cm (5' 10\")  Weight: 127.9 kg (282 lb)  SpO2: 99 %      Physical Exam  Vitals and nursing note reviewed.   Constitutional:       General: She is not in acute distress.     Appearance: Normal appearance. She is not ill-appearing.   HENT:      Right Ear: Tympanic membrane normal.      Left Ear: Tympanic membrane normal.      Mouth/Throat:      Pharynx: Pharyngeal swelling and posterior oropharyngeal erythema present.   Pulmonary:      Effort: Pulmonary effort is normal. No respiratory distress.      Breath sounds: Normal breath sounds. No wheezing.   Skin:     Findings: " Rash present.      Comments: Patient has an erythematous rash across the chest and some redness noted over the face and cheek area.   Neurological:      Mental Status: She is alert.         ED Course                 Procedures        No results found for this or any previous visit (from the past 24 hour(s)).    Medications   cetirizine (zyrTEC) tablet 10 mg (10 mg Oral $Given 1/5/24 2150)   dexAMETHasone (DECADRON) tablet 10 mg (10 mg Oral $Given 1/5/24 2150)     Patient appears to be having a severe allergic reaction/early anaphylaxis, likely from the penicillin.  Unfortunately patient gets hives from Benadryl but does tolerate Zyrtec.  Patient was given some Zyrtec here and some Decadron and monitored for an hour.  Patient's symptoms have significantly improved over that time.  Throat feels a lot better and she is having no trouble breathing.  When I looked in her ears they did appear to be normal some recommending that she stop the penicillin I do not think we need to change to a different antibiotic.  Will have her continue the Zyrtec twice a day for the next 3 days over the weekend.  Patient was told to return if symptoms do change or worsen.    Assessments & Plan (with Medical Decision Making)  Allergic reaction to a drug     I have reviewed the nursing notes.    I have reviewed the findings, diagnosis, plan and need for follow up with the patient.      New Prescriptions    No medications on file       Final diagnoses:   Allergic reaction to drug, initial encounter       1/5/2024   [facility] EMERGENCY DEPT       Germain Mcarthur MD  01/05/24 [id number]

## 2024-01-06 NOTE — DISCHARGE INSTRUCTIONS
1.  Stop the penicillin and do not take any further doses.  2.  Continue to take Zyrtec twice a day for the next 2 days to prevent any rebound reactions.  3.  Please return to the emergency department if your symptoms do change or worsen.

## 2024-02-08 ENCOUNTER — MEDICAL CORRESPONDENCE (OUTPATIENT)
Dept: SCHEDULING | Facility: CLINIC | Age: 39
End: 2024-02-08
Payer: COMMERCIAL

## 2024-02-19 ENCOUNTER — HOSPITAL ENCOUNTER (OUTPATIENT)
Dept: MRI IMAGING | Facility: CLINIC | Age: 39
Discharge: HOME | End: 2024-02-19
Attending: NURSE PRACTITIONER | Admitting: NURSE PRACTITIONER
Payer: COMMERCIAL

## 2024-02-19 DIAGNOSIS — S86.012A RUPTURE OF ACHILLES TENDON, LEFT, INITIAL ENCOUNTER: ICD-10-CM

## 2024-02-19 PROCEDURE — 73721 MRI JNT OF LWR EXTRE W/O DYE: CPT | Mod: 26 | Performed by: RADIOLOGY

## 2024-02-19 PROCEDURE — 73721 MRI JNT OF LWR EXTRE W/O DYE: CPT | Mod: LT

## 2024-03-27 NOTE — LETTER
7/17/2023       RE: Leah Holloway  [address]  [facility] [address]     Dear Colleague,    Thank you for referring your patient, Leah Holloway, to the [facility] [place] at [facility]. Please see a copy of my visit note below.      Nephrology Clinic Note  January 25, 2023    CC: Management of HTN due to AISSATOU, S/P midrenal artery angioplasty due to FMD  In 7/17/ 2008    BP: 139/95 to 155/106 today    HPI: Leah Holloway is a 37 year old female who presents for follow up for her uncontrolled hypertension.  The patient has a history of hypertension since at least 2008 thought to be secondary to right renal artery stenosis for which she underwent right midrenal artery 5 mm angioplasty for stenosis related to fibromuscular  dysplasia in the [facility] on 7/17/2008. She also has a h/o severe obesity (BMI around 38 ->42) and seasonal allergies.  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  Proximal superior aneurysm measures 7.8 mm in diameter. Artery between  the aneurysms measures 4.3 mm in diameter. Inferior more distal  aneurysm measures 8.6 mm in diameter. In 2018, the proximal aneurysm  measured 7.8 mm and the distal aneurysm measured 8.4 mm.      Two right and single left renal arteries. Superior right renal artery is the main renal artery. Smaller inferior right renal artery originates anteriorly from the aorta 16 mm inferior to the main superior right renal artery and supplies a portion of the ventral lower pole.     No renal artery stenosis or dissection demonstrated. No renal artery arterial beading suggested.     Patent aorta without aneurysm, dissection, or stenosis. Celiac,  superior mesenteric, and inferior mesenteric arteries are patent  without stenosis, beading, or aneurysm.  Bilateral common, internal,  and external iliac arteries patent. Bilateral corona mortis. Bilateral  common femoral arteries patent.     Her maternal grandmother has hypertension and her father started having hypertension at the age of 55.     Her renal function is intact however she had low potassium levels in the past. Her potassium level is 4.5 on 7/15.    She gained at least 15 lbs over the past 6 months and has had severe headaches over the past two weeks for which she has been taking intermittently ibuprofen and excedrin. She presented on 7/15 to the ED with hypertensive emergency and hydrochlorothiazide 25 mg daily was added to her regimen. Her BP has since then improved a little bit.      Mentioned that she is buzy and its hard for her to eat balanced diet and eats take out often.         Allergies   Allergen Reactions    Aluminum Hives     Reaction when pt comes into contact with aluminum.    Benadryl [Diphenhydramine-Zinc Acetate]     Unknown [No Clinical Screening - See Comments] Rash     Adhesives       amLODIPine (NORVASC) 5 MG tablet, Take 1 tablet (5 mg) by mouth daily for 120 days  BP MONITOR-STETHOSCOPE KIT, test as directed  FLONASE 50 MCG/ACT NA SUSP, USE 2 SPRAYS IN EACH NOSTRIL ONCE DAILY  hydrochlorothiazide (HYDRODIURIL) 25 MG tablet, Take 1 tablet (25 mg) by mouth daily  losartan (COZAAR) 100 MG tablet, Take 1 tablet by mouth daily at 2 pm  telmisartan (MICARDIS) 80 MG tablet, Take 1 tablet (80 mg) by mouth daily for 120 days    No current facility-administered medications on file prior to visit.      No past medical history on file.    Past Surgical History:   Procedure Laterality Date    ARTHROSCOPY KNEE Right 1/22/2016    Procedure: ARTHROSCOPY KNEE;  Surgeon: Ian Jaimes MD;  Location: [facility] OR    GENITOURINARY SURGERY      HC KNEE SCOPE,PART SYNOVECT Right 1/22/16    Medial plica excision - WORK COMP       Social History     Tobacco Use    Smoking status: Never    Smokeless  tobacco: Never    Tobacco comments:     No smokers in home.   Vaping Use    Vaping Use: Never used   Substance Use Topics    Alcohol use: No    Drug use: No       Family History   Problem Relation Age of Onset    Coronary Artery Disease No family hx of     Colon Cancer No family hx of     Mental Illness No family hx of     Obesity No family hx of     Osteoporosis No family hx of     Depression No family hx of     Cerebrovascular Disease No family hx of        ROS: A 12 system review of systems was negative other than noted here or above.     Exam:  LMP  (LMP Unknown)     GENERAL APPEARANCE: alert and no distress  EYES: PERRL, no scleral icterus  HENT: mouth without ulcers or lesions  NECK: supple, no adenopathy  RESP: lungs clear to auscultation   CV: regular rhythm, normal rate, no rub  Extremities: no clubbing, cyanosis, or edema  SKIN: no rash  NEURO: mentation intact and speech normal  PSYCH: affect normal/bright      Results:    Lab on 01/25/2023   Component Date Value Ref Range Status    WBC Count 01/25/2023 8.3  4.0 - 11.0 10e3/uL Final    RBC Count 01/25/2023 4.57  3.80 - 5.20 10e6/uL Final    Hemoglobin 01/25/2023 12.9  11.7 - 15.7 g/dL Final    Hematocrit 01/25/2023 39.3  35.0 - 47.0 % Final    MCV 01/25/2023 86  78 - 100 fL Final    MCH 01/25/2023 28.2  26.5 - 33.0 pg Final    MCHC 01/25/2023 32.8  31.5 - 36.5 g/dL Final    RDW 01/25/2023 12.7  10.0 - 15.0 % Final    Platelet Count 01/25/2023 208  150 - 450 10e3/uL Final    % Neutrophils 01/25/2023 53  % Final    % Lymphocytes 01/25/2023 37  % Final    % Monocytes 01/25/2023 8  % Final    % Eosinophils 01/25/2023 2  % Final    % Basophils 01/25/2023 0  % Final    % Immature Granulocytes 01/25/2023 0  % Final    NRBCs per 100 WBC 01/25/2023 0  <1 /100 Final    Absolute Neutrophils 01/25/2023 4.4  1.6 - 8.3 10e3/uL Final    Absolute Lymphocytes 01/25/2023 3.1  0.8 - 5.3 10e3/uL Final    Absolute Monocytes 01/25/2023 0.7  0.0 - 1.3 10e3/uL Final    Absolute  Eosinophils 01/25/2023 0.1  0.0 - 0.7 10e3/uL Final    Absolute Basophils 01/25/2023 0.0  0.0 - 0.2 10e3/uL Final    Absolute Immature Granulocytes 01/25/2023 0.0  <=0.4 10e3/uL Final    Absolute NRBCs 01/25/2023 0.0  10e3/uL Final       Assessment/Plan:   Leah Holloway is a 37 year old female who presents for follow up for her uncontrolled hypertension.    #Hypertension: no evidence of renal artery stenosis on  Imaging done in July 2022 however given rising BP will reorder a CTA of the abdomen. Given the positive family history there could be a genetic component but also obesity and high sodium intake are also contributory along with ibuprofen and excedrin intake. In addition, it is hard to determine whether her headaches are raising the BP or the other way around.  -Pursue for now the recently added hydrochlorothiazide, telmisartan 80 mg daily and amlodipine 5 mg and check a CMP in 2 weeks.  - BP diary and communicate numbers via my chart.  - Decrease sodium diet and processed food  -Stop ibuprofen and excedrin and refer to neurology for headache control  - Exercise and lose weight   - Follow up in 3 months      Sri Amaral MD  Division of Nephrology and Hypertension  [facility]     Statement Selected

## 2024-11-10 ENCOUNTER — HEALTH MAINTENANCE LETTER (OUTPATIENT)
Age: 39
End: 2024-11-10

## 2025-01-26 ENCOUNTER — MYC REFILL (OUTPATIENT)
Dept: OTHER | Facility: CLINIC | Age: 40
End: 2025-01-26
Payer: COMMERCIAL

## 2025-01-26 DIAGNOSIS — I12.9 HYPERTENSION, RENAL: ICD-10-CM

## 2025-01-27 RX ORDER — POTASSIUM CHLORIDE 750 MG/1
10 TABLET, EXTENDED RELEASE ORAL 2 TIMES DAILY
Qty: 60 TABLET | Refills: 0 | Status: SHIPPED | OUTPATIENT
Start: 2025-01-27

## 2025-01-27 NOTE — TELEPHONE ENCOUNTER
potassium chloride ER (KLOR-CON M) 10 MEQ CR tablet     Last Written Prescription Date:  10/17/23  Last Fill Quantity: 30,  # refills: 3   Last office visit: 10/17/2023 with prescribing provider:  Dr. Darrell Hadley   Last virtual visit: 12/18/2023 with prescribing provider:  Dr. Darrell Hadley   Future Office Visit:      Routing refill request to provider for review/approval because:   Recent (12 mo) or future (90 days) visit within the authorizing provider's department    Normal serum potassium in past 12 months       Med and pharm loaded.  MyChart message sent to patient notifying them that a follow up appointment is needed for future refills.    Shawnee Murdock, KULWANTN, RN, CV-BC, CNOR  [facility]

## 2025-01-29 ENCOUNTER — HOSPITAL ENCOUNTER (EMERGENCY)
Facility: CLINIC | Age: 40
Discharge: HOME | End: 2025-01-30
Attending: STUDENT IN AN ORGANIZED HEALTH CARE EDUCATION/TRAINING PROGRAM | Admitting: STUDENT IN AN ORGANIZED HEALTH CARE EDUCATION/TRAINING PROGRAM
Payer: COMMERCIAL

## 2025-01-29 VITALS
RESPIRATION RATE: 22 BRPM | OXYGEN SATURATION: 100 % | WEIGHT: 292 LBS | DIASTOLIC BLOOD PRESSURE: 115 MMHG | HEART RATE: 89 BPM | BODY MASS INDEX: 41.9 KG/M2 | SYSTOLIC BLOOD PRESSURE: 219 MMHG | TEMPERATURE: 97.5 F

## 2025-01-29 DIAGNOSIS — R51.9 NONINTRACTABLE EPISODIC HEADACHE, UNSPECIFIED HEADACHE TYPE: ICD-10-CM

## 2025-01-29 DIAGNOSIS — I15.1 HYPERTENSION SECONDARY TO OTHER RENAL DISORDERS: ICD-10-CM

## 2025-01-29 LAB
ALBUMIN SERPL BCG-MCNC: 4.4 G/DL (ref 3.5–5.2)
ALBUMIN UR-MCNC: NEGATIVE MG/DL
ALP SERPL-CCNC: 61 U/L (ref 40–150)
ALT SERPL W P-5'-P-CCNC: 11 U/L (ref 0–50)
ANION GAP SERPL CALCULATED.3IONS-SCNC: 12 MMOL/L (ref 7–15)
APPEARANCE UR: CLEAR
AST SERPL W P-5'-P-CCNC: 12 U/L (ref 0–45)
ATRIAL RATE - MUSE: 77 BPM
BASOPHILS # BLD AUTO: 0 10E3/UL (ref 0–0.2)
BASOPHILS NFR BLD AUTO: 0 %
BILIRUB SERPL-MCNC: 0.3 MG/DL
BILIRUB UR QL STRIP: NEGATIVE
BUN SERPL-MCNC: 9.8 MG/DL (ref 6–20)
CALCIUM SERPL-MCNC: 8.7 MG/DL (ref 8.8–10.4)
CHLORIDE SERPL-SCNC: 105 MMOL/L (ref 98–107)
COLOR UR AUTO: ABNORMAL
CREAT SERPL-MCNC: 0.78 MG/DL (ref 0.51–0.95)
DIASTOLIC BLOOD PRESSURE - MUSE: NORMAL MMHG
EGFRCR SERPLBLD CKD-EPI 2021: >90 ML/MIN/1.73M2
EOSINOPHIL # BLD AUTO: 0.2 10E3/UL (ref 0–0.7)
EOSINOPHIL NFR BLD AUTO: 2 %
ERYTHROCYTE [DISTWIDTH] IN BLOOD BY AUTOMATED COUNT: 13.4 % (ref 10–15)
GLUCOSE SERPL-MCNC: 91 MG/DL (ref 70–99)
GLUCOSE UR STRIP-MCNC: NEGATIVE MG/DL
HCO3 SERPL-SCNC: 22 MMOL/L (ref 22–29)
HCT VFR BLD AUTO: 39.2 % (ref 35–47)
HGB BLD-MCNC: 13.1 G/DL (ref 11.7–15.7)
HGB UR QL STRIP: ABNORMAL
HOLD SPECIMEN: NORMAL
IMM GRANULOCYTES # BLD: 0 10E3/UL
IMM GRANULOCYTES NFR BLD: 0 %
INTERPRETATION ECG - MUSE: NORMAL
KETONES UR STRIP-MCNC: NEGATIVE MG/DL
LEUKOCYTE ESTERASE UR QL STRIP: NEGATIVE
LYMPHOCYTES # BLD AUTO: 4.4 10E3/UL (ref 0.8–5.3)
LYMPHOCYTES NFR BLD AUTO: 40 %
MCH RBC QN AUTO: 28.4 PG (ref 26.5–33)
MCHC RBC AUTO-ENTMCNC: 33.4 G/DL (ref 31.5–36.5)
MCV RBC AUTO: 85 FL (ref 78–100)
MONOCYTES # BLD AUTO: 0.9 10E3/UL (ref 0–1.3)
MONOCYTES NFR BLD AUTO: 8 %
MUCOUS THREADS #/AREA URNS LPF: PRESENT /LPF
NEUTROPHILS # BLD AUTO: 5.3 10E3/UL (ref 1.6–8.3)
NEUTROPHILS NFR BLD AUTO: 49 %
NITRATE UR QL: NEGATIVE
NRBC # BLD AUTO: 0 10E3/UL
NRBC BLD AUTO-RTO: 0 /100
P AXIS - MUSE: 36 DEGREES
PH UR STRIP: 6.5 [PH] (ref 5–7)
PLATELET # BLD AUTO: 232 10E3/UL (ref 150–450)
POTASSIUM SERPL-SCNC: 3.5 MMOL/L (ref 3.4–5.3)
PR INTERVAL - MUSE: 136 MS
PROT SERPL-MCNC: 7 G/DL (ref 6.4–8.3)
QRS DURATION - MUSE: 96 MS
QT - MUSE: 390 MS
QTC - MUSE: 441 MS
R AXIS - MUSE: -7 DEGREES
RBC # BLD AUTO: 4.62 10E6/UL (ref 3.8–5.2)
RBC URINE: <1 /HPF
SODIUM SERPL-SCNC: 139 MMOL/L (ref 135–145)
SP GR UR STRIP: 1.02 (ref 1–1.03)
SQUAMOUS EPITHELIAL: 2 /HPF
SYSTOLIC BLOOD PRESSURE - MUSE: NORMAL MMHG
T AXIS - MUSE: 51 DEGREES
T4 FREE SERPL-MCNC: 1.1 NG/DL (ref 0.9–1.7)
TROPONIN T SERPL HS-MCNC: 7 NG/L
TROPONIN T SERPL HS-MCNC: 7 NG/L
TSH SERPL DL<=0.005 MIU/L-ACNC: 4.66 UIU/ML (ref 0.3–4.2)
UROBILINOGEN UR STRIP-MCNC: NORMAL MG/DL
VENTRICULAR RATE- MUSE: 77 BPM
WBC # BLD AUTO: 10.8 10E3/UL (ref 4–11)
WBC URINE: 0 /HPF

## 2025-01-29 PROCEDURE — 84443 ASSAY THYROID STIM HORMONE: CPT | Performed by: STUDENT IN AN ORGANIZED HEALTH CARE EDUCATION/TRAINING PROGRAM

## 2025-01-29 PROCEDURE — 85048 AUTOMATED LEUKOCYTE COUNT: CPT | Performed by: STUDENT IN AN ORGANIZED HEALTH CARE EDUCATION/TRAINING PROGRAM

## 2025-01-29 PROCEDURE — 84484 ASSAY OF TROPONIN QUANT: CPT | Performed by: STUDENT IN AN ORGANIZED HEALTH CARE EDUCATION/TRAINING PROGRAM

## 2025-01-29 PROCEDURE — 99285 EMERGENCY DEPT VISIT HI MDM: CPT | Mod: 25 | Performed by: STUDENT IN AN ORGANIZED HEALTH CARE EDUCATION/TRAINING PROGRAM

## 2025-01-29 PROCEDURE — 82310 ASSAY OF CALCIUM: CPT | Performed by: STUDENT IN AN ORGANIZED HEALTH CARE EDUCATION/TRAINING PROGRAM

## 2025-01-29 PROCEDURE — 36415 COLL VENOUS BLD VENIPUNCTURE: CPT | Performed by: STUDENT IN AN ORGANIZED HEALTH CARE EDUCATION/TRAINING PROGRAM

## 2025-01-29 PROCEDURE — 81003 URINALYSIS AUTO W/O SCOPE: CPT | Performed by: STUDENT IN AN ORGANIZED HEALTH CARE EDUCATION/TRAINING PROGRAM

## 2025-01-29 PROCEDURE — 82565 ASSAY OF CREATININE: CPT | Performed by: STUDENT IN AN ORGANIZED HEALTH CARE EDUCATION/TRAINING PROGRAM

## 2025-01-29 PROCEDURE — 85014 HEMATOCRIT: CPT | Performed by: STUDENT IN AN ORGANIZED HEALTH CARE EDUCATION/TRAINING PROGRAM

## 2025-01-29 PROCEDURE — 96374 THER/PROPH/DIAG INJ IV PUSH: CPT | Mod: 59 | Performed by: STUDENT IN AN ORGANIZED HEALTH CARE EDUCATION/TRAINING PROGRAM

## 2025-01-29 PROCEDURE — 93005 ELECTROCARDIOGRAM TRACING: CPT | Performed by: STUDENT IN AN ORGANIZED HEALTH CARE EDUCATION/TRAINING PROGRAM

## 2025-01-29 PROCEDURE — 84439 ASSAY OF FREE THYROXINE: CPT | Performed by: STUDENT IN AN ORGANIZED HEALTH CARE EDUCATION/TRAINING PROGRAM

## 2025-01-29 PROCEDURE — 250N000011 HC RX IP 250 OP 636: Performed by: STUDENT IN AN ORGANIZED HEALTH CARE EDUCATION/TRAINING PROGRAM

## 2025-01-29 PROCEDURE — 250N000013 HC RX MED GY IP 250 OP 250 PS 637: Performed by: STUDENT IN AN ORGANIZED HEALTH CARE EDUCATION/TRAINING PROGRAM

## 2025-01-29 PROCEDURE — 85004 AUTOMATED DIFF WBC COUNT: CPT | Performed by: STUDENT IN AN ORGANIZED HEALTH CARE EDUCATION/TRAINING PROGRAM

## 2025-01-29 RX ORDER — CLONIDINE HYDROCHLORIDE 0.1 MG/1
0.2 TABLET ORAL ONCE
Status: COMPLETED | OUTPATIENT
Start: 2025-01-29 | End: 2025-01-29

## 2025-01-29 RX ORDER — AMLODIPINE BESYLATE 5 MG/1
5 TABLET ORAL ONCE
Status: COMPLETED | OUTPATIENT
Start: 2025-01-29 | End: 2025-01-29

## 2025-01-29 RX ORDER — AMLODIPINE BESYLATE 5 MG/1
5 TABLET ORAL DAILY
Qty: 30 TABLET | Refills: 0 | Status: SHIPPED | OUTPATIENT
Start: 2025-01-29

## 2025-01-29 RX ORDER — HYDRALAZINE HYDROCHLORIDE 20 MG/ML
10 INJECTION INTRAMUSCULAR; INTRAVENOUS ONCE
Status: COMPLETED | OUTPATIENT
Start: 2025-01-29 | End: 2025-01-29

## 2025-01-29 RX ADMIN — HYDRALAZINE HYDROCHLORIDE 10 MG: 20 INJECTION INTRAMUSCULAR; INTRAVENOUS at 23:09

## 2025-01-29 RX ADMIN — CLONIDINE HYDROCHLORIDE 0.2 MG: 0.1 TABLET ORAL at 23:10

## 2025-01-29 RX ADMIN — AMLODIPINE BESYLATE 5 MG: 5 TABLET ORAL at 23:10

## 2025-01-29 ASSESSMENT — ENCOUNTER SYMPTOMS: CHEST TIGHTNESS: 1

## 2025-01-29 ASSESSMENT — ACTIVITIES OF DAILY LIVING (ADL)
ADLS_ACUITY_SCORE: 41

## 2025-01-29 ASSESSMENT — COLUMBIA-SUICIDE SEVERITY RATING SCALE - C-SSRS
1. IN THE PAST MONTH, HAVE YOU WISHED YOU WERE DEAD OR WISHED YOU COULD GO TO SLEEP AND NOT WAKE UP?: NO
2. HAVE YOU ACTUALLY HAD ANY THOUGHTS OF KILLING YOURSELF IN THE PAST MONTH?: NO
6. HAVE YOU EVER DONE ANYTHING, STARTED TO DO ANYTHING, OR PREPARED TO DO ANYTHING TO END YOUR LIFE?: NO

## 2025-01-30 PROCEDURE — 250N000011 HC RX IP 250 OP 636: Performed by: STUDENT IN AN ORGANIZED HEALTH CARE EDUCATION/TRAINING PROGRAM

## 2025-01-30 PROCEDURE — 96375 TX/PRO/DX INJ NEW DRUG ADDON: CPT | Performed by: STUDENT IN AN ORGANIZED HEALTH CARE EDUCATION/TRAINING PROGRAM

## 2025-01-30 RX ORDER — KETOROLAC TROMETHAMINE 15 MG/ML
15 INJECTION, SOLUTION INTRAMUSCULAR; INTRAVENOUS ONCE
Status: COMPLETED | OUTPATIENT
Start: 2025-01-30 | End: 2025-01-30

## 2025-01-30 RX ADMIN — PROCHLORPERAZINE EDISYLATE 5 MG: 5 INJECTION INTRAMUSCULAR; INTRAVENOUS at 00:12

## 2025-01-30 RX ADMIN — KETOROLAC TROMETHAMINE 15 MG: 15 INJECTION, SOLUTION INTRAMUSCULAR; INTRAVENOUS at 00:12

## 2025-01-30 NOTE — ED PROVIDER NOTES
History     Chief Complaint   Patient presents with    Chest Pain     HPI  Leah Holloway is a 39 year old female who with chest discomfort since Saturday.  She notes intermittent sharp pain to the chest.  She is no radiation to the pain to her shoulders or back.  She has no numbness ting of extremities.  She is no headache or dizziness patient of the right vision changes or difficulties with ambulation.  Denies any nausea vomiting diaphoresis/sweating.  Patient does suffer from renal artery stenosis and has had procedures for this in the past and notes that she is otherwise had difficulty with her blood pressure recently again.  She is currently taking amlodipine hydrochlorothiazide Imdur and telmisartan for blood pressure control.  Her last visit on the fifth of this past month showed a blood pressure of 212/134.    Last echo showed an EF of 60-65.  This was done in July 2023.    Allergies:  Allergies   Allergen Reactions    Aluminum Hives     Reaction when pt comes into contact with aluminum.    Penicillins Anaphylaxis    Benadryl [Diphenhydramine-Zinc Acetate]     Unknown [No Clinical Screening - See Comments] Rash     Adhesives       Problem List:    Patient Active Problem List    Diagnosis Date Noted    Morbid obesity (H) 07/06/2022     Priority: Medium    Plica of knee, right 01/28/2016     Priority: Medium    Renal artery stenosis 01/13/2016     Priority: Medium     Surgery in 2009      Chondromalacia patellae, right 09/10/2015     Priority: Medium    Synovitis of knee 08/13/2015     Priority: Medium    CARDIOVASCULAR SCREENING; LDL GOAL LESS THAN 160 10/31/2010     Priority: Medium    GERD (gastroesophageal reflux disease) 05/28/2008     Priority: Medium    Elevated blood pressure reading without diagnosis of hypertension 03/19/2008     Priority: Medium    Headache 03/19/2008     Priority: Medium     Problem list name updated by automated process. Provider to review      Syncope and collapse 03/19/2008      Priority: Medium        Past Medical History:    History reviewed. No pertinent past medical history.    Past Surgical History:    Past Surgical History:   Procedure Laterality Date    ARTHROSCOPY KNEE Right 1/22/2016    Procedure: ARTHROSCOPY KNEE;  Surgeon: Ian Jaimes MD;  Location: [facility] OR    GENITOURINARY SURGERY      HC KNEE SCOPE,PART SYNOVECT Right 1/22/16    Medial plica excision - WORK COMP    IR RENAL ANGIOGRAM BILATERAL  10/27/2023       Family History:    Family History   Problem Relation Age of Onset    Coronary Artery Disease No family hx of     Colon Cancer No family hx of     Mental Illness No family hx of     Obesity No family hx of     Osteoporosis No family hx of     Depression No family hx of     Cerebrovascular Disease No family hx of        Social History:  Marital Status:  Single [1]  Social History     Tobacco Use    Smoking status: Never    Smokeless tobacco: Never    Tobacco comments:     No smokers in home.   Vaping Use    Vaping status: Never Used   Substance Use Topics    Alcohol use: No    Drug use: No        Medications:    amLODIPine (NORVASC) 5 MG tablet  amLODIPine (NORVASC) 5 MG tablet  BP MONITOR-STETHOSCOPE KIT  FLONASE 50 MCG/ACT NA SUSP  hydrochlorothiazide (HYDRODIURIL) 25 MG tablet  isosorbide mononitrate (IMDUR) 30 MG 24 hr tablet  potassium chloride dejah ER (KLOR-CON M10) 10 MEQ CR tablet  telmisartan (MICARDIS) 80 MG tablet          Review of Systems   Respiratory:  Positive for chest tightness.    Cardiovascular:  Positive for chest pain.   All other systems reviewed and are negative.      Physical Exam   BP: (!) 219/115  Pulse: 89  Temp: 97.5  F (36.4  C)  Resp: 22  Weight: 132.5 kg (292 lb)  SpO2: 100 %      Physical Exam  Vitals and nursing note reviewed.   Constitutional:       General: She is not in acute distress.     Appearance: Normal appearance. She is obese. She is not ill-appearing or diaphoretic.   HENT:      Head: Atraumatic.      Mouth/Throat:       Mouth: Mucous membranes are moist.   Eyes:      General: No scleral icterus.     Conjunctiva/sclera: Conjunctivae normal.   Cardiovascular:      Rate and Rhythm: Normal rate and regular rhythm.      Heart sounds: Normal heart sounds. No murmur heard.  Pulmonary:      Effort: No respiratory distress.      Breath sounds: Normal breath sounds. No wheezing or rales.   Chest:      Chest wall: No tenderness.   Abdominal:      General: Abdomen is flat. Bowel sounds are normal.      Palpations: Abdomen is soft.   Musculoskeletal:      Cervical back: Neck supple.   Skin:     General: Skin is warm.      Findings: No rash.   Neurological:      Mental Status: She is alert.         ED Course        Procedures         EKG self interpreted at 2009 with normal sinus rhythm at 77 bpm with her VA interval of 136 QTc of 441 and QRS of 96.  No axis deviation bundle-branch block ectopic beats or signs of ST segment elevations or depressions.  Normal EKG.         Results for orders placed or performed during the hospital encounter of 01/29/25 (from the past 24 hours)   EKG 12 lead   Result Value Ref Range    Systolic Blood Pressure  mmHg    Diastolic Blood Pressure  mmHg    Ventricular Rate 77 BPM    Atrial Rate 77 BPM    VA Interval 136 ms    QRS Duration 96 ms     ms    QTc 441 ms    P Axis 36 degrees    R AXIS -7 degrees    T Axis 51 degrees    Interpretation ECG       Sinus rhythm  Normal ECG  No previous ECGs available  Confirmed by SEE ED PROVIDER NOTE FOR, ECG INTERPRETATION ([id number]),  Dwight Diez ([id number]) on 1/29/2025 10:07:43 PM     UA with Microscopic reflex to Culture    Specimen: Urine, Midstream   Result Value Ref Range    Color Urine Light Yellow Colorless, Straw, Light Yellow, Yellow    Appearance Urine Clear Clear    Glucose Urine Negative Negative mg/dL    Bilirubin Urine Negative Negative    Ketones Urine Negative Negative mg/dL    Specific Gravity Urine 1.022 1.003 - 1.035    Blood Urine Trace (A)  Negative    pH Urine 6.5 5.0 - 7.0    Protein Albumin Urine Negative Negative mg/dL    Urobilinogen Urine Normal Normal, 2.0 mg/dL    Nitrite Urine Negative Negative    Leukocyte Esterase Urine Negative Negative    Mucus Urine Present (A) None Seen /LPF    RBC Urine <1 <=2 /HPF    WBC Urine 0 <=5 /HPF    Squamous Epithelials Urine 2 (H) <=1 /HPF    Narrative    Urine Culture not indicated   CBC with platelets differential    Narrative    The following orders were created for panel order CBC with platelets differential.  Procedure                               Abnormality         Status                     ---------                               -----------         ------                     CBC with platelets and d...[[id number]]                      Final result                 Please view results for these tests on the individual orders.   Comprehensive metabolic panel   Result Value Ref Range    Sodium 139 135 - 145 mmol/L    Potassium 3.5 3.4 - 5.3 mmol/L    Carbon Dioxide (CO2) 22 22 - 29 mmol/L    Anion Gap 12 7 - 15 mmol/L    Urea Nitrogen 9.8 6.0 - 20.0 mg/dL    Creatinine 0.78 0.51 - 0.95 mg/dL    GFR Estimate >90 >60 mL/min/1.73m2    Calcium 8.7 (L) 8.8 - 10.4 mg/dL    Chloride 105 98 - 107 mmol/L    Glucose 91 70 - 99 mg/dL    Alkaline Phosphatase 61 40 - 150 U/L    AST 12 0 - 45 U/L    ALT 11 0 - 50 U/L    Protein Total 7.0 6.4 - 8.3 g/dL    Albumin 4.4 3.5 - 5.2 g/dL    Bilirubin Total 0.3 <=1.2 mg/dL   Troponin T, High Sensitivity   Result Value Ref Range    Troponin T, High Sensitivity 7 <=14 ng/L   TSH with free T4 reflex   Result Value Ref Range    TSH 4.66 (H) 0.30 - 4.20 uIU/mL   Woody Draw    Narrative    The following orders were created for panel order Woody Draw.  Procedure                               Abnormality         Status                     ---------                               -----------         ------                     Extra Blood Bank Purple ...[[id number]]                       Final result                 Please view results for these tests on the individual orders.   CBC with platelets and differential   Result Value Ref Range    WBC Count 10.8 4.0 - 11.0 10e3/uL    RBC Count 4.62 3.80 - 5.20 10e6/uL    Hemoglobin 13.1 11.7 - 15.7 g/dL    Hematocrit 39.2 35.0 - 47.0 %    MCV 85 78 - 100 fL    MCH 28.4 26.5 - 33.0 pg    MCHC 33.4 31.5 - 36.5 g/dL    RDW 13.4 10.0 - 15.0 %    Platelet Count 232 150 - 450 10e3/uL    % Neutrophils 49 %    % Lymphocytes 40 %    % Monocytes 8 %    % Eosinophils 2 %    % Basophils 0 %    % Immature Granulocytes 0 %    NRBCs per 100 WBC 0 <1 /100    Absolute Neutrophils 5.3 1.6 - 8.3 10e3/uL    Absolute Lymphocytes 4.4 0.8 - 5.3 10e3/uL    Absolute Monocytes 0.9 0.0 - 1.3 10e3/uL    Absolute Eosinophils 0.2 0.0 - 0.7 10e3/uL    Absolute Basophils 0.0 0.0 - 0.2 10e3/uL    Absolute Immature Granulocytes 0.0 <=0.4 10e3/uL    Absolute NRBCs 0.0 10e3/uL   Extra Blood Bank Purple Top Tube   Result Value Ref Range    Hold Specimen JIC    T4 free   Result Value Ref Range    Free T4 1.10 0.90 - 1.70 ng/dL   Troponin T, High Sensitivity   Result Value Ref Range    Troponin T, High Sensitivity 7 <=14 ng/L       Medications   amLODIPine (NORVASC) tablet 5 mg (5 mg Oral $Given 1/29/25 2310)   hydrALAZINE (APRESOLINE) injection 10 mg (10 mg Intravenous $Given 1/29/25 2309)   cloNIDine (CATAPRES) tablet 0.2 mg (0.2 mg Oral $Given 1/29/25 2310)   prochlorperazine (COMPAZINE) injection 5 mg (5 mg Intravenous $Given 1/30/25 0012)   ketorolac (TORADOL) injection 15 mg (15 mg Intravenous $Given 1/30/25 0012)       Assessments & Plan (with Medical Decision Making)     I have reviewed the nursing notes.    I have reviewed the findings, diagnosis, plan and need for follow up with the patient.      Medical Decision Making  Leah Holloway is a 39 year old female who with chest discomfort since Saturday.  She notes intermittent sharp pain to the chest.  She is no radiation to the  pain to her shoulders or back.  She has no numbness ting of extremities.  She is no headache or dizziness patient of the right vision changes or difficulties with ambulation.  Patient does suffer from renal artery stenosis and has had procedures for this in the past and notes that she is otherwise had difficulty with her blood pressure recently again.  She is currently taking amlodipine hydrochlorothiazide Imdur and telmisartan for blood pressure control.  Her last visit on the fifth of this past month showed a blood pressure of 212/134.    Last echo showed an EF of 60-65.  This was done in July 2023.  Physical exam does not show any acute cardiopulmonary maladies on examination.  She is pleasant throughout the examination.  She denies any alcohol abuse or drug abuse.    Patient's CBC is reassuring with no leukocytosis or anemia.  His CMP does not show any renal impairment and stable electrolytes.  Her TSH is mildly elevated at 4.66 and is found to have an unremarkable UA.  EKG is reassuring.    Improvement of patient's blood pressure after providing patient with hydralazine.  Still elevated to 180s over 110.  Added in clonidine 0.2 mg discussed the likely lower blood pressure last 24 hours and to monitor her blood pressures at home in regards to this.  We also provided dose of amlodipine that will help with blood pressure lowering over the next 2 to 3 days and this is the medication she normally should be on.  Ultimately she has not been taking any other medications except for her telmisartan and she supposed to be on 4 different agents.  She notes that she gets headaches and does not feel well on some these agents and has been taking this for this reason.  We discussed that ultimately that this point sounds like there is some noncompliance concerns as well as secondary hypertension needs to be addressed.  And could be feeling unwell with the medication secondary to her chronic hypertension with resolution of this  hypertension can often come some side effects for some period of time as well as possible side effects of the medications as well but unable to provide any further education and/or medications without knowing exactly what medications she is currently trialed in the past as well as medications she has suffered from side effects from as she is unable to really recall these medications either.  Ultimately do not see any signs of a medical emergencies such as hypertensive urgency or emergency and with its chronicity believe patient be discharged home with these medical agents and provided patient a 1 month prescription for bilateral amlodipine with outpatient follow-up instructions with her vascular team for further instructions in regards to her blood pressure management management.  Patient is happy with this plan as well as mom at bedside patient discharged home      Discharge Medication List as of 1/30/2025 12:05 AM        START taking these medications    Details   !! amLODIPine (NORVASC) 5 MG tablet Take 1 tablet (5 mg) by mouth daily., Disp-30 tablet, R-0, E-Prescribe       !! - Potential duplicate medications found. Please discuss with provider.          Final diagnoses:   Nonintractable episodic headache, unspecified headache type   Hypertension secondary to other renal disorders       1/29/2025   [facility] EMERGENCY DEPT       Sergio Dinh MD  01/30/25 0022

## 2025-01-30 NOTE — DISCHARGE INSTRUCTIONS
Follow-up with your vascular team tomorrow to we discussed medications that you are currently on and new medication that may be required to help with your blood pressure management aspect.  We provided with hydralazine which works acutely in the ER to help decrease blood pressure more quickly along with clonidine that will last for the next 24 hours and then the amlodipine that will start to work about 2 to 3 days.  Please continue the amlodipine tomorrow.  Please return for any acute concerns.

## 2025-02-04 ENCOUNTER — OFFICE VISIT (OUTPATIENT)
Dept: FAMILY MEDICINE | Facility: OTHER | Age: 40
End: 2025-02-04
Payer: COMMERCIAL

## 2025-02-04 VITALS
HEART RATE: 78 BPM | OXYGEN SATURATION: 98 % | SYSTOLIC BLOOD PRESSURE: 156 MMHG | WEIGHT: 286 LBS | HEIGHT: 70 IN | DIASTOLIC BLOOD PRESSURE: 110 MMHG | BODY MASS INDEX: 40.94 KG/M2 | TEMPERATURE: 98.4 F | RESPIRATION RATE: 20 BRPM

## 2025-02-04 DIAGNOSIS — F41.1 GAD (GENERALIZED ANXIETY DISORDER): ICD-10-CM

## 2025-02-04 DIAGNOSIS — I10 HTN, GOAL BELOW 140/90: ICD-10-CM

## 2025-02-04 DIAGNOSIS — I70.1 RENAL ARTERY STENOSIS: ICD-10-CM

## 2025-02-04 DIAGNOSIS — J30.89 SEASONAL ALLERGIC RHINITIS DUE TO OTHER ALLERGIC TRIGGER: ICD-10-CM

## 2025-02-04 DIAGNOSIS — Z12.31 VISIT FOR SCREENING MAMMOGRAM: ICD-10-CM

## 2025-02-04 DIAGNOSIS — Z11.59 NEED FOR HEPATITIS C SCREENING TEST: ICD-10-CM

## 2025-02-04 DIAGNOSIS — E66.01 MORBID OBESITY (H): ICD-10-CM

## 2025-02-04 DIAGNOSIS — Z00.00 ROUTINE HISTORY AND PHYSICAL EXAMINATION OF ADULT: Primary | ICD-10-CM

## 2025-02-04 DIAGNOSIS — E78.5 HYPERLIPIDEMIA LDL GOAL <130: ICD-10-CM

## 2025-02-04 DIAGNOSIS — Z12.4 CERVICAL CANCER SCREENING: ICD-10-CM

## 2025-02-04 LAB
CHOLEST SERPL-MCNC: 182 MG/DL
HDLC SERPL-MCNC: 47 MG/DL
LDLC SERPL CALC-MCNC: 108 MG/DL
NONHDLC SERPL-MCNC: 135 MG/DL
TRIGL SERPL-MCNC: 134 MG/DL

## 2025-02-04 PROCEDURE — 99396 PREV VISIT EST AGE 40-64: CPT | Performed by: PHYSICIAN ASSISTANT

## 2025-02-04 PROCEDURE — 99214 OFFICE O/P EST MOD 30 MIN: CPT | Mod: 25 | Performed by: PHYSICIAN ASSISTANT

## 2025-02-04 RX ORDER — ALBUTEROL SULFATE 90 UG/1
2 INHALANT RESPIRATORY (INHALATION) EVERY 6 HOURS PRN
Qty: 8.5 G | Refills: 0 | Status: SHIPPED | OUTPATIENT
Start: 2025-02-04

## 2025-02-04 SDOH — HEALTH STABILITY: PHYSICAL HEALTH: ON AVERAGE, HOW MANY MINUTES DO YOU ENGAGE IN EXERCISE AT THIS LEVEL?: 150+ MIN

## 2025-02-04 SDOH — HEALTH STABILITY: PHYSICAL HEALTH: ON AVERAGE, HOW MANY DAYS PER WEEK DO YOU ENGAGE IN MODERATE TO STRENUOUS EXERCISE (LIKE A BRISK WALK)?: 4 DAYS

## 2025-02-04 ASSESSMENT — SOCIAL DETERMINANTS OF HEALTH (SDOH): HOW OFTEN DO YOU GET TOGETHER WITH FRIENDS OR RELATIVES?: ONCE A WEEK

## 2025-02-04 ASSESSMENT — PATIENT HEALTH QUESTIONNAIRE - PHQ9
SUM OF ALL RESPONSES TO PHQ QUESTIONS 1-9: 14
10. IF YOU CHECKED OFF ANY PROBLEMS, HOW DIFFICULT HAVE THESE PROBLEMS MADE IT FOR YOU TO DO YOUR WORK, TAKE CARE OF THINGS AT HOME, OR GET ALONG WITH OTHER PEOPLE: EXTREMELY DIFFICULT

## 2025-02-04 ASSESSMENT — PAIN SCALES - GENERAL: PAINLEVEL_OUTOF10: SEVERE PAIN (8)

## 2025-02-04 NOTE — PROGRESS NOTES
Preventive Care Visit  [facility]  Bravo James PA-C, Family Medicine  Feb 4, 2025      Assessment & Plan     Routine history and physical examination of adult  39-year-old female here for routine physical.  Repeat in 1 year.  She has begun her menstrual cycle today still declines Pap smear and pelvic exam.  Strongly recommend that she get this done with a primary care provider of her choice in the near future in between cycles.    Cervical cancer screening  See above    Visit for screening mammogram  Due  - MA Screening Bilateral w/ [place]; Future    Need for hepatitis C screening test  Declined screening today consider self low risk.    HTN, goal below 140/90  Morbid obesity (H)  Renal artery stenosis  She presents with a litany of medications that do not work well for her.  Calcium channel blockers causes bilateral lower extremity edema but she is willing to continue with the 5 mg amlodipine.  She will not double up.  Beta-blockers cause increased fatigue and lightheadedness.  Hydrochlorothiazide causes her to have headaches as well so she takes it about every other day.  She will need to see her specialist for follow-up and further control.  Declines any further intervention from me other than placing the referrals at this point in time.  - Adult Nephrology  Referral; Future  - Adult Cardiology Eval  Referral; Future    Hyperlipidemia LDL goal <130  Goal established but with her blood pressure and age it might be wiser to be less than 100.  - Lipid panel reflex to direct LDL Fasting; Future    CLIVE (generalized anxiety disorder)  Briefly discussed.  Strongly recommended counseling.  Will not be adding other medications at this point in time.  Follow-up in 3 months is encouraged.  - Adult Mental Health  Referral; Future    Seasonal allergic rhinitis due to other allergic trigger  There is some question whether or not she truly has asthma.  She notes that whenever  "she comes to contact with somebody with significant amount of perfume or cologne on that she coughs pretty hard and for an extended period of time.  Certainly sounds like this could be an asthmatic type of trigger.  Do recommend pulmonary function test to see what we can find out for her.  May be nothing more than just allergies.  Did prescribe a albuterol inhaler for her for follow-up in the near future.  - General PFT Lab (Please always keep checked); Future  - Pulmonary Function Test; Future  - albuterol (PROAIR HFA/PROVENTIL HFA/VENTOLIN HFA) 108 (90 Base) MCG/ACT inhaler; Inhale 2 puffs into the lungs every 6 hours as needed for shortness of breath or cough.    Patient has been advised of split billing requirements and indicates understanding: Yes        BMI  Estimated body mass index is 41.41 kg/m  as calculated from the following:    Height as of this encounter: 1.77 m (5' 9.69\").    Weight as of this encounter: 129.7 kg (286 lb).   Weight management plan: Patient was referred to their PCP to discuss a diet and exercise plan.    Depression Screening Follow Up        2/4/2025     6:59 AM   PHQ   PHQ-9 Total Score 14    Q9: Thoughts of better off dead/self-harm past 2 weeks Not at all        Proxy-reported         Follow Up Actions Taken  Crisis resource information provided in After Visit Summary  Mental Health Referral placed     Counseling  Appropriate preventive services were addressed with this patient via screening, questionnaire, or discussion as appropriate for fall prevention, nutrition, physical activity, Tobacco-use cessation, social engagement, weight loss and cognition.  Checklist reviewing preventive services available has been given to the patient.  Reviewed patient's diet, addressing concerns and/or questions.   The patient was instructed to see the dentist every 6 months.   She is at risk for psychosocial distress and has been provided with information to reduce risk.   The patient's PHQ-9 " score is consistent with moderate depression. She was provided with information regarding depression.       Work on weight loss  Regular exercise    Subjective   Shawnee is a 39 year old, presenting for the following:  Physical        2/4/2025     7:05 AM   Additional Questions   Roomed by Ara   Accompanied by self          History of Present Illness       Hypertension: She presents for follow up of hypertension.  She does check blood pressure  regularly outside of the clinic. Outside blood pressures have been over 140/90. She does not follow a low salt diet.     She eats 0-1 servings of fruits and vegetables daily.She consumes 2 sweetened beverage(s) daily.She exercises with enough effort to increase her heart rate 20 to 29 minutes per day.  She exercises with enough effort to increase her heart rate 4 days per week.       ED/UC Followup:    Facility:  [facility]   Date of visit: 1/29/25  Reason for visit: High blood Pressure   Current Status: Currently lower, still very high at home    Health Care Directive  Patient does not have a Health Care Directive: Discussed advance care planning with patient; however, patient declined at this time.      2/4/2025   General Health   How would you rate your overall physical health? (!) FAIR   Feel stress (tense, anxious, or unable to sleep) Very much   (!) STRESS CONCERN      2/4/2025   Nutrition   Three or more servings of calcium each day? (!) NO   Diet: Regular (no restrictions)   How many servings of fruit and vegetables per day? (!) 0-1   How many sweetened beverages each day? (!) 2         2/4/2025   Exercise   Days per week of moderate/strenous exercise 4 days   Average minutes spent exercising at this level 150+ min         2/4/2025   Social Factors   Frequency of gathering with friends or relatives Once a week   Worry food won't last until get money to buy more No   Food not last or not have enough money for food? No   Do you have housing? (Housing is defined as stable  permanent housing and does not include staying ouside in a car, in a tent, in an abandoned building, in an overnight shelter, or couch-surfing.) Yes   Are you worried about losing your housing? No   Lack of transportation? No   Unable to get utilities (heat,electricity)? No         2/4/2025   Dental   Dentist two times every year? (!) NO         2/4/2025   TB Screening   Were you born outside of the [place]? No       Today's PHQ-9 Score:       2/4/2025     6:59 AM   PHQ-9 SCORE   PHQ-9 Total Score MyChart 14 (Moderate depression)   PHQ-9 Total Score 14        Proxy-reported         2/4/2025   Substance Use   Alcohol more than 3/day or more than 7/wk No   Do you use any other substances recreationally? No     Social History     Tobacco Use    Smoking status: Never    Smokeless tobacco: Never    Tobacco comments:     No smokers in home.   Vaping Use    Vaping status: Never Used   Substance Use Topics    Alcohol use: No    Drug use: No          Mammogram Screening - Mammogram every 1-2 years updated in Health Maintenance based on mutual decision making        2/4/2025   STI Screening   New sexual partner(s) since last STI/HIV test? No     History of abnormal Pap smear: No - age 30- 64 PAP with HPV every 5 years recommended       ASCVD Risk   The ASCVD Risk score (Jammie DK, et al., 2019) failed to calculate for the following reasons:    Cannot find a previous HDL lab    Cannot find a previous total cholesterol lab        2/4/2025   Contraception/Family Planning   Questions about contraception or family planning No        Reviewed and updated as needed this visit by Provider                    Past Medical History:   Diagnosis Date    CARDIOVASCULAR SCREENING; LDL GOAL LESS THAN 160 10/31/2010    Elevated blood pressure reading without diagnosis of hypertension 03/19/2008     Past Surgical History:   Procedure Laterality Date    ARTHROSCOPY KNEE Right 1/22/2016    Procedure: ARTHROSCOPY KNEE;  Surgeon: Fiona  Ian Husain MD;  Location: [facility] OR    GENITOURINARY SURGERY      HC KNEE SCOPE,PART SYNOVECT Right 1/22/16    Medial plica excision - WORK COMP    IR RENAL ANGIOGRAM BILATERAL  10/27/2023     Lab work is in process  Labs reviewed in EPIC  BP Readings from Last 3 Encounters:   02/04/25 (!) 156/110   01/29/25 (!) 219/115   01/05/24 (!) 157/108    Wt Readings from Last 3 Encounters:   02/04/25 129.7 kg (286 lb)   01/29/25 132.5 kg (292 lb)   01/05/24 127.9 kg (282 lb)                  Patient Active Problem List   Diagnosis    Headache    Syncope and collapse    GERD (gastroesophageal reflux disease)    Synovitis of knee    Chondromalacia patellae, right    Renal artery stenosis    Plica of knee, right    Morbid obesity (H)    HTN, goal below 140/90    Seasonal allergic rhinitis due to other allergic trigger     Past Surgical History:   Procedure Laterality Date    ARTHROSCOPY KNEE Right 1/22/2016    Procedure: ARTHROSCOPY KNEE;  Surgeon: Ian Jaimes MD;  Location: [facility] OR    GENITOURINARY SURGERY      HC KNEE SCOPE,PART SYNOVECT Right 1/22/16    Medial plica excision - WORK COMP    IR RENAL ANGIOGRAM BILATERAL  10/27/2023       Social History     Tobacco Use    Smoking status: Never    Smokeless tobacco: Never    Tobacco comments:     No smokers in home.   Substance Use Topics    Alcohol use: No     Family History   Problem Relation Age of Onset    Coronary Artery Disease No family hx of     Colon Cancer No family hx of     Mental Illness No family hx of     Obesity No family hx of     Osteoporosis No family hx of     Depression No family hx of     Cerebrovascular Disease No family hx of          Current Outpatient Medications   Medication Sig Dispense Refill    albuterol (PROAIR HFA/PROVENTIL HFA/VENTOLIN HFA) 108 (90 Base) MCG/ACT inhaler Inhale 2 puffs into the lungs every 6 hours as needed for shortness of breath or cough. 8.5 g 0    amLODIPine (NORVASC) 5 MG tablet Take 1 tablet (5 mg) by mouth daily 90  "tablet 3    BP MONITOR-STETHOSCOPE KIT test as directed 1 kit 0    hydrochlorothiazide (HYDRODIURIL) 25 MG tablet Take 1 tablet (25 mg) by mouth daily 30 tablet 3    potassium chloride dejah ER (KLOR-CON M10) 10 MEQ CR tablet Take 1 tablet (10 mEq) by mouth 2 times daily. 60 tablet 0    telmisartan (MICARDIS) 80 MG tablet Take 1 tablet (80 mg) by mouth daily 120 tablet 3    FLONASE 50 MCG/ACT NA SUSP USE 2 SPRAYS IN EACH NOSTRIL ONCE DAILY (Patient not taking: Reported on 2/4/2025) 3 Bottle 3    isosorbide mononitrate (IMDUR) 30 MG 24 hr tablet Take 1 tablet (30 mg) by mouth daily (Patient not taking: Reported on 2/4/2025) 30 tablet 11     Allergies   Allergen Reactions    Aluminum Hives     Reaction when pt comes into contact with aluminum.    Penicillins Anaphylaxis    Benadryl [Diphenhydramine-Zinc Acetate]     Unknown [No Clinical Screening - See Comments] Rash     Adhesives     Recent Labs   Lab Test 01/29/25  2051 11/15/23  0816 10/27/23  0659 07/28/23  0921 07/15/23  1910 02/08/23  1144 01/25/23  1446 07/25/22  1208 01/24/21  2111 06/24/20 2020   ALT 11  --   --  13 13   < > 12   < > 13 18   CR 0.78 0.80   < > 0.89 0.76   < > 0.74   < > 0.76 0.73   GFRESTIMATED >90 >90   < > 85 >90   < > >90   < > >90 >90   GFRESTBLACK  --   --   --   --   --   --   --   --  >90 >90   POTASSIUM 3.5 3.4   < > 3.5 4.5   < > 3.9   < > 3.4 3.3*   TSH 4.66*  --   --   --   --   --  4.00   < >  --   --     < > = values in this interval not displayed.          Review of Systems  Constitutional, HEENT, cardiovascular, pulmonary, GI, , musculoskeletal, neuro, skin, endocrine and psych systems are negative, except as otherwise noted.     Objective    Exam  BP (!) 156/110   Pulse 78   Temp 98.4  F (36.9  C) (Temporal)   Resp 20   Ht 1.77 m (5' 9.69\")   Wt 129.7 kg (286 lb)   LMP 02/04/2025   SpO2 98%   BMI 41.41 kg/m     Estimated body mass index is 41.41 kg/m  as calculated from the following:    Height as of this encounter: " "1.77 m (5' 9.69\").    Weight as of this encounter: 129.7 kg (286 lb).    Physical Exam  GENERAL: alert and no distress  EYES: Eyes grossly normal to inspection, PERRL and conjunctivae and sclerae normal  HENT: ear canals and TM's normal, nose and mouth without ulcers or lesions  NECK: no adenopathy, no asymmetry, masses, or scars  RESP: lungs clear to auscultation - no rales, rhonchi or wheezes  CV: regular rate and rhythm, normal S1 S2, no S3 or S4, no murmur, click or rub, no peripheral edema  ABDOMEN: soft, nontender, no hepatosplenomegaly, no masses and bowel sounds normal  MS: no gross musculoskeletal defects noted, no edema  SKIN: no suspicious lesions or rashes  NEURO: Normal strength and tone, mentation intact and speech normal  PSYCH: mentation appears normal, affect normal/bright        Signed Electronically by: Bravo James PA-C    "

## 2025-04-29 ENCOUNTER — TELEPHONE (OUTPATIENT)
Dept: FAMILY MEDICINE | Facility: OTHER | Age: 40
End: 2025-04-29
Payer: COMMERCIAL

## 2025-04-29 NOTE — TELEPHONE ENCOUNTER
Patient Quality Outreach    Patient is due for the following:   Hypertension -  BP check and Hypertension follow-up visit    Action(s) Taken:   No follow up needed at this time.  Pt was informed to see specialist about this since she did not want anything further from [facility].    Type of outreach:    Chart review performed, no outreach needed.    Questions for provider review:    None         Constance Noonan, CHERRIE  Chart routed to None.

## 2025-06-10 ENCOUNTER — TELEPHONE (OUTPATIENT)
Dept: FAMILY MEDICINE | Facility: OTHER | Age: 40
End: 2025-06-10
Payer: COMMERCIAL

## 2025-06-10 NOTE — TELEPHONE ENCOUNTER
Patient Quality Outreach    Patient is due for the following:        Action(s) Taken:     No follow up needed at this time.  Pt was informed to see specialist about this since she did not want anything further from [facility].  Type of outreach:    Chart review performed, no outreach needed.      Questions for provider review:    None         Kellie Wallace MA  Chart routed to None.

## 2025-08-06 ENCOUNTER — HOSPITAL ENCOUNTER (EMERGENCY)
Facility: CLINIC | Age: 40
Discharge: HOME | End: 2025-08-06
Attending: EMERGENCY MEDICINE | Admitting: EMERGENCY MEDICINE
Payer: COMMERCIAL

## 2025-08-06 VITALS
HEART RATE: 72 BPM | DIASTOLIC BLOOD PRESSURE: 109 MMHG | BODY MASS INDEX: 41.04 KG/M2 | TEMPERATURE: 97.9 F | OXYGEN SATURATION: 99 % | SYSTOLIC BLOOD PRESSURE: 169 MMHG | HEIGHT: 70 IN | RESPIRATION RATE: 20 BRPM

## 2025-08-06 DIAGNOSIS — R00.2 PALPITATIONS: Primary | ICD-10-CM

## 2025-08-06 DIAGNOSIS — I12.9 HYPERTENSION, RENAL: ICD-10-CM

## 2025-08-06 DIAGNOSIS — E87.6 HYPOKALEMIA: ICD-10-CM

## 2025-08-06 DIAGNOSIS — I10 HYPERTENSION, UNSPECIFIED TYPE: ICD-10-CM

## 2025-08-06 LAB
ANION GAP SERPL CALCULATED.3IONS-SCNC: 11 MMOL/L (ref 7–15)
ATRIAL RATE - MUSE: 76 BPM
BASOPHILS # BLD AUTO: 0 10E3/UL (ref 0–0.2)
BASOPHILS NFR BLD AUTO: 0 %
BUN SERPL-MCNC: 11.2 MG/DL (ref 6–20)
CALCIUM SERPL-MCNC: 9.1 MG/DL (ref 8.8–10.4)
CHLORIDE SERPL-SCNC: 100 MMOL/L (ref 98–107)
CREAT SERPL-MCNC: 0.95 MG/DL (ref 0.51–0.95)
DIASTOLIC BLOOD PRESSURE - MUSE: NORMAL MMHG
EGFRCR SERPLBLD CKD-EPI 2021: 77 ML/MIN/1.73M2
EOSINOPHIL # BLD AUTO: 0.2 10E3/UL (ref 0–0.7)
EOSINOPHIL NFR BLD AUTO: 1 %
ERYTHROCYTE [DISTWIDTH] IN BLOOD BY AUTOMATED COUNT: 13 % (ref 10–15)
GLUCOSE SERPL-MCNC: 103 MG/DL (ref 70–99)
HCO3 SERPL-SCNC: 26 MMOL/L (ref 22–29)
HCT VFR BLD AUTO: 38.8 % (ref 35–47)
HGB BLD-MCNC: 13.3 G/DL (ref 11.7–15.7)
HOLD SPECIMEN: NORMAL
HOLD SPECIMEN: NORMAL
IMM GRANULOCYTES # BLD: 0.1 10E3/UL
IMM GRANULOCYTES NFR BLD: 1 %
INTERPRETATION ECG - MUSE: NORMAL
LYMPHOCYTES # BLD AUTO: 3.7 10E3/UL (ref 0.8–5.3)
LYMPHOCYTES NFR BLD AUTO: 34 %
MCH RBC QN AUTO: 28.5 PG (ref 26.5–33)
MCHC RBC AUTO-ENTMCNC: 34.3 G/DL (ref 31.5–36.5)
MCV RBC AUTO: 83 FL (ref 78–100)
MONOCYTES # BLD AUTO: 0.9 10E3/UL (ref 0–1.3)
MONOCYTES NFR BLD AUTO: 8 %
NEUTROPHILS # BLD AUTO: 6.1 10E3/UL (ref 1.6–8.3)
NEUTROPHILS NFR BLD AUTO: 56 %
NRBC # BLD AUTO: 0 10E3/UL
NRBC BLD AUTO-RTO: 0 /100
P AXIS - MUSE: 37 DEGREES
PLATELET # BLD AUTO: 234 10E3/UL (ref 150–450)
POTASSIUM SERPL-SCNC: 3.2 MMOL/L (ref 3.4–5.3)
PR INTERVAL - MUSE: 152 MS
QRS DURATION - MUSE: 98 MS
QT - MUSE: 378 MS
QTC - MUSE: 425 MS
R AXIS - MUSE: -22 DEGREES
RBC # BLD AUTO: 4.66 10E6/UL (ref 3.8–5.2)
SODIUM SERPL-SCNC: 137 MMOL/L (ref 135–145)
SYSTOLIC BLOOD PRESSURE - MUSE: NORMAL MMHG
T AXIS - MUSE: 24 DEGREES
TROPONIN T SERPL HS-MCNC: 6 NG/L
VENTRICULAR RATE- MUSE: 76 BPM
WBC # BLD AUTO: 11 10E3/UL (ref 4–11)

## 2025-08-06 PROCEDURE — 250N000013 HC RX MED GY IP 250 OP 250 PS 637: Performed by: EMERGENCY MEDICINE

## 2025-08-06 PROCEDURE — 84484 ASSAY OF TROPONIN QUANT: CPT | Performed by: EMERGENCY MEDICINE

## 2025-08-06 PROCEDURE — 99284 EMERGENCY DEPT VISIT MOD MDM: CPT | Performed by: EMERGENCY MEDICINE

## 2025-08-06 PROCEDURE — 36415 COLL VENOUS BLD VENIPUNCTURE: CPT | Performed by: EMERGENCY MEDICINE

## 2025-08-06 PROCEDURE — 80048 BASIC METABOLIC PNL TOTAL CA: CPT | Performed by: EMERGENCY MEDICINE

## 2025-08-06 PROCEDURE — 85004 AUTOMATED DIFF WBC COUNT: CPT | Performed by: EMERGENCY MEDICINE

## 2025-08-06 PROCEDURE — 93010 ELECTROCARDIOGRAM REPORT: CPT | Performed by: EMERGENCY MEDICINE

## 2025-08-06 PROCEDURE — 99285 EMERGENCY DEPT VISIT HI MDM: CPT | Performed by: EMERGENCY MEDICINE

## 2025-08-06 RX ORDER — POTASSIUM CHLORIDE 750 MG/1
10 TABLET, EXTENDED RELEASE ORAL 2 TIMES DAILY
Qty: 60 TABLET | Refills: 2 | Status: SHIPPED | OUTPATIENT
Start: 2025-08-06

## 2025-08-06 RX ORDER — POTASSIUM CHLORIDE 1500 MG/1
40 TABLET, EXTENDED RELEASE ORAL ONCE
Status: COMPLETED | OUTPATIENT
Start: 2025-08-06 | End: 2025-08-06

## 2025-08-06 RX ORDER — AMLODIPINE BESYLATE 5 MG/1
5 TABLET ORAL DAILY
Qty: 30 TABLET | Refills: 2 | Status: SHIPPED | OUTPATIENT
Start: 2025-08-06

## 2025-08-06 RX ORDER — AMLODIPINE BESYLATE 5 MG/1
5 TABLET ORAL ONCE
Status: COMPLETED | OUTPATIENT
Start: 2025-08-06 | End: 2025-08-06

## 2025-08-06 RX ADMIN — AMLODIPINE BESYLATE 5 MG: 5 TABLET ORAL at 23:12

## 2025-08-06 RX ADMIN — POTASSIUM CHLORIDE 40 MEQ: 1500 TABLET, EXTENDED RELEASE ORAL at 23:12

## 2025-08-06 ASSESSMENT — COLUMBIA-SUICIDE SEVERITY RATING SCALE - C-SSRS
6. HAVE YOU EVER DONE ANYTHING, STARTED TO DO ANYTHING, OR PREPARED TO DO ANYTHING TO END YOUR LIFE?: NO
2. HAVE YOU ACTUALLY HAD ANY THOUGHTS OF KILLING YOURSELF IN THE PAST MONTH?: NO
1. IN THE PAST MONTH, HAVE YOU WISHED YOU WERE DEAD OR WISHED YOU COULD GO TO SLEEP AND NOT WAKE UP?: NO

## 2025-08-06 ASSESSMENT — ACTIVITIES OF DAILY LIVING (ADL)
ADLS_ACUITY_SCORE: 41
ADLS_ACUITY_SCORE: 41

## (undated) RX ORDER — FENTANYL CITRATE 50 UG/ML
INJECTION, SOLUTION INTRAMUSCULAR; INTRAVENOUS
Status: DISPENSED
Start: 2023-10-27

## (undated) RX ORDER — LIDOCAINE HYDROCHLORIDE 10 MG/ML
INJECTION, SOLUTION INFILTRATION; PERINEURAL
Status: DISPENSED
Start: 2023-10-27

## (undated) RX ORDER — HEPARIN SODIUM 200 [USP'U]/100ML
INJECTION, SOLUTION INTRAVENOUS
Status: DISPENSED
Start: 2023-10-27

## (undated) RX ORDER — ONDANSETRON 2 MG/ML
INJECTION INTRAMUSCULAR; INTRAVENOUS
Status: DISPENSED
Start: 2023-10-27

## (undated) RX ORDER — HEPARIN SODIUM 1000 [USP'U]/ML
INJECTION, SOLUTION INTRAVENOUS; SUBCUTANEOUS
Status: DISPENSED
Start: 2023-10-27